# Patient Record
Sex: FEMALE | Race: OTHER | Employment: UNEMPLOYED | URBAN - METROPOLITAN AREA
[De-identification: names, ages, dates, MRNs, and addresses within clinical notes are randomized per-mention and may not be internally consistent; named-entity substitution may affect disease eponyms.]

---
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
## 2021-03-24 ENCOUNTER — HOSPITAL ENCOUNTER (OUTPATIENT)
Age: 21
Discharge: HOME | End: 2021-03-24
Payer: COMMERCIAL

## 2021-03-24 LAB
-: ABNORMAL
AMORPHOUS: ABNORMAL
BACTERIA: ABNORMAL
BILIRUBIN URINE: NEGATIVE
CASTS UA: ABNORMAL /LPF
COLOR: YELLOW
COMMENT UA: ABNORMAL
CRYSTALS, UA: ABNORMAL /HPF
EPITHELIAL CELLS UA: ABNORMAL /HPF
GLUCOSE URINE: NEGATIVE
HCG(URINE) PREGNANCY TEST: NEGATIVE
KETONES, URINE: NEGATIVE
LEUKOCYTE ESTERASE, URINE: NEGATIVE
MUCUS: ABNORMAL
NITRITE, URINE: NEGATIVE
OTHER OBSERVATIONS UA: ABNORMAL
PH UA: 5.5 (ref 5–8)
PROTEIN UA: NEGATIVE
RBC UA: ABNORMAL /HPF
RENAL EPITHELIAL, UA: ABNORMAL /HPF
SPECIFIC GRAVITY UA: 1.02 (ref 1–1.03)
TRICHOMONAS: ABNORMAL
TURBIDITY: CLEAR
URINE HGB: ABNORMAL
UROBILINOGEN, URINE: NORMAL
WBC UA: ABNORMAL /HPF
YEAST: ABNORMAL

## 2021-03-24 PROCEDURE — 81001 URINALYSIS AUTO W/SCOPE: CPT

## 2021-03-24 PROCEDURE — 87591 N.GONORRHOEAE DNA AMP PROB: CPT

## 2021-03-24 PROCEDURE — 87491 CHLMYD TRACH DNA AMP PROBE: CPT

## 2021-03-24 PROCEDURE — 87086 URINE CULTURE/COLONY COUNT: CPT

## 2021-03-24 PROCEDURE — 81025 URINE PREGNANCY TEST: CPT

## 2021-03-25 LAB
C. TRACHOMATIS DNA ,URINE: NEGATIVE
N. GONORRHOEAE DNA, URINE: NEGATIVE
SPECIMEN DESCRIPTION: NORMAL

## 2021-03-26 LAB
CULTURE: NORMAL
Lab: NORMAL
SPECIMEN DESCRIPTION: NORMAL

## 2021-12-30 ENCOUNTER — HOSPITAL ENCOUNTER (EMERGENCY)
Age: 21
Discharge: HOME | End: 2021-12-30
Attending: EMERGENCY MEDICINE
Payer: COMMERCIAL

## 2021-12-30 VITALS
OXYGEN SATURATION: 99 % | TEMPERATURE: 97.3 F | SYSTOLIC BLOOD PRESSURE: 111 MMHG | DIASTOLIC BLOOD PRESSURE: 66 MMHG | HEART RATE: 109 BPM | WEIGHT: 130 LBS | BODY MASS INDEX: 22.2 KG/M2 | RESPIRATION RATE: 16 BRPM | HEIGHT: 64 IN

## 2021-12-30 DIAGNOSIS — R11.2 INTRACTABLE VOMITING WITH NAUSEA, UNSPECIFIED VOMITING TYPE: Primary | ICD-10-CM

## 2021-12-30 DIAGNOSIS — R82.71 ASYMPTOMATIC BACTERIURIA: ICD-10-CM

## 2021-12-30 LAB
-: ABNORMAL
ABSOLUTE EOS #: 0.1 K/UL (ref 0–0.4)
ABSOLUTE IMMATURE GRANULOCYTE: ABNORMAL K/UL (ref 0–0.3)
ABSOLUTE LYMPH #: 1.2 K/UL (ref 1–4.8)
ABSOLUTE MONO #: 0.6 K/UL (ref 0.1–1.3)
ALBUMIN SERPL-MCNC: 4.5 G/DL (ref 3.5–5.2)
ALBUMIN/GLOBULIN RATIO: ABNORMAL (ref 1–2.5)
ALP BLD-CCNC: 55 U/L (ref 35–104)
ALT SERPL-CCNC: 18 U/L (ref 5–33)
AMORPHOUS: ABNORMAL
ANION GAP SERPL CALCULATED.3IONS-SCNC: 12 MMOL/L (ref 9–17)
AST SERPL-CCNC: 24 U/L
BACTERIA: ABNORMAL
BASOPHILS # BLD: 0 % (ref 0–2)
BASOPHILS ABSOLUTE: 0 K/UL (ref 0–0.2)
BILIRUB SERPL-MCNC: 0.21 MG/DL (ref 0.3–1.2)
BILIRUBIN URINE: NEGATIVE
BUN BLDV-MCNC: 5 MG/DL (ref 6–20)
BUN/CREAT BLD: ABNORMAL (ref 9–20)
CALCIUM SERPL-MCNC: 9.4 MG/DL (ref 8.6–10.4)
CASTS UA: ABNORMAL /LPF
CHLORIDE BLD-SCNC: 100 MMOL/L (ref 98–107)
CO2: 21 MMOL/L (ref 20–31)
COLOR: YELLOW
COMMENT UA: ABNORMAL
CREAT SERPL-MCNC: 0.44 MG/DL (ref 0.5–0.9)
CRYSTALS, UA: ABNORMAL /HPF
DIFFERENTIAL TYPE: ABNORMAL
EOSINOPHILS RELATIVE PERCENT: 1 % (ref 0–4)
EPITHELIAL CELLS UA: ABNORMAL /HPF
GFR AFRICAN AMERICAN: >60 ML/MIN
GFR NON-AFRICAN AMERICAN: >60 ML/MIN
GFR SERPL CREATININE-BSD FRML MDRD: ABNORMAL ML/MIN/{1.73_M2}
GFR SERPL CREATININE-BSD FRML MDRD: ABNORMAL ML/MIN/{1.73_M2}
GLUCOSE BLD-MCNC: 89 MG/DL (ref 70–99)
GLUCOSE URINE: NEGATIVE
HCT VFR BLD CALC: 27.2 % (ref 36–46)
HEMOGLOBIN: 9.1 G/DL (ref 12–16)
IMMATURE GRANULOCYTES: ABNORMAL %
KETONES, URINE: NEGATIVE
LEUKOCYTE ESTERASE, URINE: ABNORMAL
LIPASE: 28 U/L (ref 13–60)
LYMPHOCYTES # BLD: 15 % (ref 25–45)
MCH RBC QN AUTO: 22.3 PG (ref 26–34)
MCHC RBC AUTO-ENTMCNC: 33.5 G/DL (ref 31–37)
MCV RBC AUTO: 66.5 FL (ref 80–100)
MONOCYTES # BLD: 8 % (ref 2–8)
MUCUS: ABNORMAL
NITRITE, URINE: NEGATIVE
NRBC AUTOMATED: ABNORMAL PER 100 WBC
OTHER OBSERVATIONS UA: ABNORMAL
PDW BLD-RTO: 17.7 % (ref 11.5–14.9)
PH UA: 7.5 (ref 5–8)
PLATELET # BLD: 405 K/UL (ref 150–450)
PLATELET ESTIMATE: ABNORMAL
PMV BLD AUTO: 7.7 FL (ref 6–12)
POTASSIUM SERPL-SCNC: 3.6 MMOL/L (ref 3.7–5.3)
PROTEIN UA: NEGATIVE
RBC # BLD: 4.09 M/UL (ref 4–5.2)
RBC # BLD: ABNORMAL 10*6/UL
RBC UA: ABNORMAL /HPF
RENAL EPITHELIAL, UA: ABNORMAL /HPF
SARS-COV-2, RAPID: NOT DETECTED
SEG NEUTROPHILS: 76 % (ref 34–64)
SEGMENTED NEUTROPHILS ABSOLUTE COUNT: 6.4 K/UL (ref 1.3–9.1)
SODIUM BLD-SCNC: 133 MMOL/L (ref 135–144)
SPECIFIC GRAVITY UA: 1.02 (ref 1–1.03)
SPECIMEN DESCRIPTION: NORMAL
TOTAL PROTEIN: 8.2 G/DL (ref 6.4–8.3)
TRICHOMONAS: ABNORMAL
TURBIDITY: CLEAR
URINE HGB: NEGATIVE
UROBILINOGEN, URINE: NORMAL
WBC # BLD: 8.3 K/UL (ref 4.5–13.5)
WBC # BLD: ABNORMAL 10*3/UL
WBC UA: ABNORMAL /HPF
YEAST: ABNORMAL

## 2021-12-30 PROCEDURE — 36415 COLL VENOUS BLD VENIPUNCTURE: CPT

## 2021-12-30 PROCEDURE — 83690 ASSAY OF LIPASE: CPT

## 2021-12-30 PROCEDURE — 6370000000 HC RX 637 (ALT 250 FOR IP): Performed by: STUDENT IN AN ORGANIZED HEALTH CARE EDUCATION/TRAINING PROGRAM

## 2021-12-30 PROCEDURE — 99285 EMERGENCY DEPT VISIT HI MDM: CPT

## 2021-12-30 PROCEDURE — 2580000003 HC RX 258: Performed by: STUDENT IN AN ORGANIZED HEALTH CARE EDUCATION/TRAINING PROGRAM

## 2021-12-30 PROCEDURE — 81001 URINALYSIS AUTO W/SCOPE: CPT

## 2021-12-30 PROCEDURE — 85025 COMPLETE CBC W/AUTO DIFF WBC: CPT

## 2021-12-30 PROCEDURE — 80053 COMPREHEN METABOLIC PANEL: CPT

## 2021-12-30 PROCEDURE — 87635 SARS-COV-2 COVID-19 AMP PRB: CPT

## 2021-12-30 RX ORDER — CEPHALEXIN 500 MG/1
500 CAPSULE ORAL 2 TIMES DAILY
Qty: 10 CAPSULE | Refills: 0 | Status: SHIPPED | OUTPATIENT
Start: 2021-12-30 | End: 2022-01-04

## 2021-12-30 RX ORDER — SODIUM CHLORIDE, SODIUM LACTATE, POTASSIUM CHLORIDE, AND CALCIUM CHLORIDE .6; .31; .03; .02 G/100ML; G/100ML; G/100ML; G/100ML
1000 INJECTION, SOLUTION INTRAVENOUS ONCE
Status: COMPLETED | OUTPATIENT
Start: 2021-12-30 | End: 2021-12-30

## 2021-12-30 RX ORDER — ACETAMINOPHEN 500 MG
1000 TABLET ORAL ONCE
Status: COMPLETED | OUTPATIENT
Start: 2021-12-30 | End: 2021-12-30

## 2021-12-30 RX ORDER — PYRIDOXINE HCL (VITAMIN B6) 50 MG
50 TABLET ORAL NIGHTLY
Qty: 21 TABLET | Refills: 0 | Status: SHIPPED | OUTPATIENT
Start: 2021-12-30

## 2021-12-30 RX ADMIN — SODIUM CHLORIDE, POTASSIUM CHLORIDE, SODIUM LACTATE AND CALCIUM CHLORIDE 1000 ML: 600; 310; 30; 20 INJECTION, SOLUTION INTRAVENOUS at 17:33

## 2021-12-30 RX ADMIN — ACETAMINOPHEN 1000 MG: 500 TABLET ORAL at 17:35

## 2021-12-30 ASSESSMENT — PAIN SCALES - GENERAL: PAINLEVEL_OUTOF10: 5

## 2021-12-30 NOTE — Clinical Note
Jordan Mittal was seen and treated in our emergency department on 12/30/2021. She may return to work on 12/30/2021. If you have any questions or concerns, please don't hesitate to call.       Farhat Dugan MD

## 2021-12-30 NOTE — ED TRIAGE NOTES
Mode of arrival (squad #, walk in, police, etc) : walk in        Chief complaint(s): headache, back pain, pregnant        Arrival Note (brief scenario, treatment PTA, etc). : Pt states she has been having headaches, back pain, abd pain, and vomiting. Pt reports she thinsk she is approx 8 weeks pregnant but doesn't have a doctor. Pt denies vaginal bleeding or urinary issues. C= \"Have you ever felt that you should Cut down on your drinking? \"  No  A= \"Have people Annoyed you by criticizing your drinking? \"  No  G= \"Have you ever felt bad or Guilty about your drinking? \"  No  E= \"Have you ever had a drink as an Eye-opener first thing in the morning to steady your nerves or to help a hangover? \"  No      Deferred []      Reason for deferring: N/A    *If yes to two or more: probable alcohol abuse. *

## 2021-12-30 NOTE — ED PROVIDER NOTES
EMERGENCY DEPARTMENT ENCOUNTER   ATTENDING ATTESTATION     Pt Name: Cyrus Martins  MRN: [id number]  [place] 2000  Date of evaluation: 12/30/21       Cyrus Martins is a 24 y.o. female who presents with Headache, Back Pain, Emesis, and Abdominal Pain      MDM:   abd nontender  Lungs cta  No focal neuro deficits  Nontoxic well appearing  covid neg  Iv fluids in ED  FU GYN [facility] [facility] monday    Vitals:   Vitals:    12/30/21 1611   BP: 111/66   Pulse: 109   Resp: 16   Temp: 97.3 °F (36.3 °C)   TempSrc: Temporal   SpO2: 99%   Weight: 130 lb (59 kg)   Height: 5' 4\" (1.626 m)         I personally evaluated and examined the patient in conjunction with the resident and agree with the assessment, treatment plan, and disposition of the patient as recorded by the resident. I performed a history and physical examination of the patient and discussed management with the resident. I reviewed the residents note and agree with the documented findings and plan of care. Any areas of disagreement are noted on the chart. I was personally present for the key portions of any procedures. I have documented in the chart those procedures where I was not present during the key portions. I have personally reviewed all images and agree with the resident's interpretation. I have reviewed the emergency nurses triage note. I agree with the chief complaint, past medical history, past surgical history, allergies, medications, social and family history as documented unless otherwise noted. The care is provided during an unprecedented national emergency due to the novel coronavirus, COVID 19.   Mica Waite MD  Attending Emergency Physician            Mica Waite MD  12/30/21 [id number]

## 2021-12-30 NOTE — ED PROVIDER NOTES
[address] ED  Emergency Department Encounter  EmergencyMedicineResident     This patient was seen during the COVID-19 crisis. There were limited resources and those resources we did have had to be conserved for the sickest of patients. Pt Name: Javed Stein  MRN: [id number]  [place] 2000  Date of evaluation: 12/30/21  PCP: No primary care provider on file. CHIEF COMPLAINT       Chief Complaint   Patient presents with    Headache    Back Pain    Emesis    Abdominal Pain       HISTORY OF PRESENT ILLNESS  (Location/Symptom, Timing/Onset, Context/Setting, Quality, Duration, Modifying Factors, Severity.)      Javed Stein is a 24 y.o. female who presents for evaluation of abdominal pain. She reports that she believes is around 2 months pregnant. This is her first pregnancy. She has no significant past medical history. She is going to the bathroom normally. She has no nausea or vomiting current, but does note that she has had a couple episodes in the last week. She currently has no medication at home for nausea or vomiting. She is taking prenatal vitamins. She is not following with an OB at this point. PAST MEDICAL / SURGICAL /SOCIAL / FAMILY HISTORY      has a past medical history of Martha-Parkinson-White pattern. has no past surgical history on file.       Social History     Socioeconomic History    Marital status: Single     Spouse name: Not on file    Number of children: Not on file    Years of education: Not on file    Highest education level: Not on file   Occupational History    Not on file   Tobacco Use    Smoking status: Never Smoker    Smokeless tobacco: Never Used   Substance and Sexual Activity    Alcohol use: No    Drug use: No    Sexual activity: Not on file   Other Topics Concern    Not on file   Social History Narrative    Not on file     Social Determinants of Health     Financial Resource Strain:     Difficulty of Paying Living Expenses: Not on file Negative for activity change, chills and fever. HENT: Negative for congestion, sinus pain and sore throat. Eyes: Negative for pain and visual disturbance. Respiratory: Negative for cough and shortness of breath. Cardiovascular: Negative for chest pain. Gastrointestinal: Positive for abdominal pain. Negative for diarrhea, nausea and vomiting. Genitourinary: Negative for difficulty urinating, dysuria and hematuria. Musculoskeletal: Negative for back pain and myalgias. Skin: Negative for rash and wound. Neurological: Negative for dizziness, light-headedness and headaches. Psychiatric/Behavioral: Negative for agitation and confusion. PHYSICAL EXAM   (up to 7 for level 4, 8 or more forlevel 5)      ED TRIAGE VITALS BP: 111/66, Temp: 97.3 °F (36.3 °C), Pulse: 109, Resp: 16, SpO2: 99 %    Vitals:    12/30/21 1611   BP: 111/66   Pulse: 109   Resp: 16   Temp: 97.3 °F (36.3 °C)   TempSrc: Temporal   SpO2: 99%   Weight: 130 lb (59 kg)   Height: 5' 4\" (1.626 m)         Physical Exam  Vitals and nursing note reviewed. Constitutional:       Appearance: Normal appearance. HENT:      Head: Normocephalic and atraumatic. Nose: Nose normal.      Mouth/Throat:      Mouth: Mucous membranes are moist.   Eyes:      Extraocular Movements: Extraocular movements intact. Pupils: Pupils are equal, round, and reactive to light. Cardiovascular:      Rate and Rhythm: Regular rhythm. Tachycardia present. Pulses: Normal pulses. Heart sounds: Normal heart sounds. Pulmonary:      Effort: Pulmonary effort is normal.      Breath sounds: Normal breath sounds. Abdominal:      General: Abdomen is flat. There is no distension. Palpations: Abdomen is soft. Tenderness: There is no abdominal tenderness. There is no guarding or rebound. Musculoskeletal:         General: Normal range of motion. Cervical back: Normal range of motion. Skin:     General: Skin is warm and dry. Capillary Refill: Capillary refill takes less than 2 seconds. Neurological:      General: No focal deficit present. Mental Status: She is alert and oriented to person, place, and time. Psychiatric:         Mood and Affect: Mood normal.         Behavior: Behavior normal.           DIFFERENTIAL  DIAGNOSIS     PLAN (LABS / IMAGING / EKG):  Orders Placed This Encounter   Procedures    COVID-19, Rapid    Urinalysis Reflex to Culture    CBC Auto Differential    Lipase    Comprehensive Metabolic Panel w/ Reflex to MG    Microscopic Urinalysis       MEDICATIONS ORDERED:  ED Medication Orders (From admission, onward)    Start Ordered     Status Ordering Provider    12/30/21 1700 12/30/21 1659  lactated ringers bolus  ONCE         Last MAR action: Stopped - by Cher Salvador on 12/30/21 at SHARMILA Maki    12/30/21 1700 12/30/21 1659  acetaminophen (TYLENOL) tablet 1,000 mg  ONCE         Last MAR action: Given - by Cher Salvador on 12/30/21 at Enedina Mendez, Shravan DUMAS          DDX: Pregnancy, UTI    DIAGNOSTIC RESULTS / EMERGENCY DEPARTMENT COURSE / MDM     IMPRESSION & INITIAL PLAN:  This is a 51-year-old female presenting return today for evaluation of abdominal pain in the setting of pregnancy. Patient reports that she believes she is around 2 months pregnant. She has not seen OB yet to this point. Complaining of crampy abdominal pain. She had a few episodes of nausea and vomiting earlier this week. She has no medication at home for nausea vomiting. She is taking prenatal vitamins. On exam her belly soft nontender. Bedside also was performed showed a eng fetus with a fetal heart rate of 150. No gross anatomical abnormalities were noted. Laboratory work-up relatively unremarkable. Plan discharge patient home with Keflex for treatment of asymptomatic bacteriuria.       LABS:  Results for orders placed or performed during the hospital encounter of 12/30/21   COVID-19, Rapid    Specimen: Nasopharyngeal Swab   Result Value Ref Range    Specimen Description . NASOPHARYNGEAL SWAB     SARS-CoV-2, Rapid Not Detected Not Detected   Urinalysis Reflex to Culture    Specimen: Urine, clean catch   Result Value Ref Range    Color, UA Yellow Yellow    Turbidity UA Clear Clear    Glucose, Ur NEGATIVE NEGATIVE    Bilirubin Urine NEGATIVE NEGATIVE    Ketones, Urine NEGATIVE NEGATIVE    Specific Gravity, UA 1.017 1.000 - 1.030    Urine Hgb NEGATIVE NEGATIVE    pH, UA 7.5 5.0 - 8.0    Protein, UA NEGATIVE NEGATIVE    Urobilinogen, Urine Normal Normal    Nitrite, Urine NEGATIVE NEGATIVE    Leukocyte Esterase, Urine SMALL (A) NEGATIVE    Urinalysis Comments NOT REPORTED    CBC Auto Differential   Result Value Ref Range    WBC 8.3 4.5 - 13.5 k/uL    RBC 4.09 4.0 - 5.2 m/uL    Hemoglobin 9.1 (L) 12.0 - 16.0 g/dL    Hematocrit 27.2 (L) 36 - 46 %    MCV 66.5 (L) 80 - 100 fL    MCH 22.3 (L) 26 - 34 pg    MCHC 33.5 31 - 37 g/dL    RDW 17.7 (H) 11.5 - 14.9 %    Platelets 977 238 - 544 k/uL    MPV 7.7 6.0 - 12.0 fL    NRBC Automated NOT REPORTED per 100 WBC    Differential Type NOT REPORTED     Immature Granulocytes NOT REPORTED 0 %    Absolute Immature Granulocyte NOT REPORTED 0.00 - 0.30 k/uL    WBC Morphology NOT REPORTED     RBC Morphology NOT REPORTED     Platelet Estimate NOT REPORTED     Seg Neutrophils 76 (H) 34 - 64 %    Lymphocytes 15 (L) 25 - 45 %    Monocytes 8 2 - 8 %    Eosinophils % 1 0 - 4 %    Basophils 0 0 - 2 %    Segs Absolute 6.40 1.3 - 9.1 k/uL    Absolute Lymph # 1.20 1.0 - 4.8 k/uL    Absolute Mono # 0.60 0.1 - 1.3 k/uL    Absolute Eos # 0.10 0.0 - 0.4 k/uL    Basophils Absolute 0.00 0.0 - 0.2 k/uL   Lipase   Result Value Ref Range    Lipase 28 13 - 60 U/L   Comprehensive Metabolic Panel w/ Reflex to MG   Result Value Ref Range    Glucose 89 70 - 99 mg/dL    BUN 5 (L) 6 - 20 mg/dL    CREATININE 0.44 (L) 0.50 - 0.90 mg/dL    Bun/Cre Ratio NOT REPORTED 9 - 20    Calcium 9.4 8.6 - 10.4 mg/dL    Sodium 133 (L) 135 - 144 mmol/L    Potassium 3.6 (L) 3.7 - 5.3 mmol/L    Chloride 100 98 - 107 mmol/L    CO2 21 20 - 31 mmol/L    Anion Gap 12 9 - 17 mmol/L    Alkaline Phosphatase 55 35 - 104 U/L    ALT 18 5 - 33 U/L    AST 24 <32 U/L    Total Bilirubin 0.21 (L) 0.3 - 1.2 mg/dL    Total Protein 8.2 6.4 - 8.3 g/dL    Albumin 4.5 3.5 - 5.2 g/dL    Albumin/Globulin Ratio NOT REPORTED 1.0 - 2.5    GFR Non-African American >60 >60 mL/min    GFR African American >60 >60 mL/min    GFR Comment          GFR Staging NOT REPORTED    Microscopic Urinalysis   Result Value Ref Range    -          WBC, UA 2 TO 5 /HPF    RBC, UA 2 TO 5 /HPF    Casts UA NOT REPORTED /LPF    Crystals, UA NOT REPORTED None /HPF    Epithelial Cells UA 10 TO 20 /HPF    Renal Epithelial, UA NOT REPORTED 0 /HPF    Bacteria, UA MODERATE (A) None    Mucus, UA NOT REPORTED None    Trichomonas, UA NOT REPORTED None    Amorphous, UA 1+ (A) None    Other Observations UA NOT REPORTED NOT REQ. Yeast, UA NOT REPORTED None       RADIOLOGY:  No orders to display     EMERGENCY DEPARTMENT COURSE:    ED Course as of 12/31/21 0241   Thu Dec 30, 2021   1757 Fetal Heart Rate 150 [TJ]      ED Course User Index  [TJ] Jimmy Angelo MD      CONSULTS:  None    CRITICAL CARE:  See attending physician note    FINAL IMPRESSION      1. Intractable vomiting with nausea, unspecified vomiting type    2.  Asymptomatic bacteriuria          DISPOSITION / PLAN     DISPOSITION Decision To Discharge 12/30/2021 06:15:30 PM      PATIENT REFERRED TO:  Arben Pedroza [id number]  [phone]  Schedule an appointment as soon as possible for a visit today      [facility] ED  [facility] [id number]  [phone]    If symptoms worsen      DISCHARGE MEDICATIONS:  Discharge Medication List as of 12/30/2021  6:21 PM      START taking these medications    Details   doxyLAMINE succinate (GNP SLEEP AID) 25 MG tablet Take 1 tablet by mouth nightly, Disp-21 tablet, R-0Normal      Pyridoxine HCl (B-6) 50 MG TABS Take 1 tablet by mouth nightly, Disp-21 tablet, R-0Normal      cephALEXin (KEFLEX) 500 MG capsule Take 1 capsule by mouth 2 times daily for 5 days, Disp-10 capsule, R-0Normal           Discharge Medication List as of 12/30/2021  6:21 PM           Vilma Carlos MD  Emergency Medicine Resident    (Please note that portions of this note were completed with a voice recognition program.  Efforts were made to edit the dictations but occasionally words are mis-transcribed.)       Vilma Carlos MD  Resident  12/31/21 [id number]

## 2022-01-11 ENCOUNTER — OFFICE VISIT (OUTPATIENT)
Dept: OBGYN CLINIC | Age: 22
End: 2022-01-11
Payer: COMMERCIAL

## 2022-01-11 ENCOUNTER — HOSPITAL ENCOUNTER (OUTPATIENT)
Age: 22
Discharge: HOME | End: 2022-01-11
Payer: COMMERCIAL

## 2022-01-11 ENCOUNTER — TELEPHONE (OUTPATIENT)
Dept: OBGYN CLINIC | Age: 22
End: 2022-01-11

## 2022-01-11 VITALS
HEIGHT: 64 IN | DIASTOLIC BLOOD PRESSURE: 74 MMHG | WEIGHT: 126 LBS | BODY MASS INDEX: 21.51 KG/M2 | SYSTOLIC BLOOD PRESSURE: 116 MMHG

## 2022-01-11 DIAGNOSIS — Z34.00 PRIMIGRAVIDA, ANTEPARTUM: ICD-10-CM

## 2022-01-11 DIAGNOSIS — N92.6 MISSED MENSES: ICD-10-CM

## 2022-01-11 DIAGNOSIS — N92.6 MISSED MENSES: Primary | ICD-10-CM

## 2022-01-11 DIAGNOSIS — O36.80X0 ENCOUNTER TO DETERMINE FETAL VIABILITY OF PREGNANCY, SINGLE OR UNSPECIFIED FETUS: Primary | ICD-10-CM

## 2022-01-11 DIAGNOSIS — O21.9 NAUSEA/VOMITING IN PREGNANCY: ICD-10-CM

## 2022-01-11 LAB — HCG QUANTITATIVE: ABNORMAL IU/L

## 2022-01-11 PROCEDURE — 99202 OFFICE O/P NEW SF 15 MIN: CPT | Performed by: NURSE PRACTITIONER

## 2022-01-11 PROCEDURE — G8420 CALC BMI NORM PARAMETERS: HCPCS | Performed by: NURSE PRACTITIONER

## 2022-01-11 PROCEDURE — G8427 DOCREV CUR MEDS BY ELIG CLIN: HCPCS | Performed by: NURSE PRACTITIONER

## 2022-01-11 PROCEDURE — 36415 COLL VENOUS BLD VENIPUNCTURE: CPT

## 2022-01-11 PROCEDURE — G8484 FLU IMMUNIZE NO ADMIN: HCPCS | Performed by: NURSE PRACTITIONER

## 2022-01-11 PROCEDURE — 84702 CHORIONIC GONADOTROPIN TEST: CPT

## 2022-01-11 PROCEDURE — 1036F TOBACCO NON-USER: CPT | Performed by: NURSE PRACTITIONER

## 2022-01-11 RX ORDER — PYRIDOXINE HCL (VITAMIN B6) 50 MG
50 TABLET ORAL 2 TIMES DAILY
Qty: 60 TABLET | Refills: 3 | Status: SHIPPED
Start: 2022-01-11 | End: 2022-02-23 | Stop reason: CLARIF

## 2022-01-11 RX ORDER — ASCORBIC ACID, BIOTIN, CHOLECALCIFEROL, CYANOCOBALAMIN, FOLIC ACID, FERROUS ASPARTO GLYCINATE, POTASSIUM IODIDE, PYRIDOXINE HYDROCHLORIDE, .ALPHA.-TOCOPHEROL ACETATE, DL-, DOCUSATE SODIUM AND BLUEBERRY 30; 75; 500; 13; 400; 10; 150; 5; 10; 200; 5; 600 MG/1; UG/1; [IU]/1; UG/1; UG/1; MG/1; UG/1; MG/1; [IU]/1; MG/1; MG/1; UG/1
1 TABLET, FILM COATED ORAL DAILY
Qty: 90 CAPSULE | Refills: 3 | Status: SHIPPED | OUTPATIENT
Start: 2022-01-11 | End: 2022-04-11

## 2022-01-11 NOTE — PROGRESS NOTES
Latisha Haddad  2022    YOB: 2000          The patient was seen today. She is here regarding early pregnancy, LMP . Went to ER at [facility] with nausea on - told she was about 8 weeks by bedside ultrasound. No longer has nausea. Hx of WPW. Denies symptoms. Pregnancy not planned but excited. First pregnancy. Her bowels are regular and she is voiding without difficulty. HPI:  Abdoul Le is a 24 y.o. female      Early pregnancy      OB History    Para Term  AB Living   1 0 0 0 0 0   SAB IAB Ectopic Molar Multiple Live Births   0 0 0 0 0 0      # Outcome Date GA Lbr Antony/2nd Weight Sex Delivery Anes PTL Lv   1 Current                Past Medical History:   Diagnosis Date    Martha-Parkinson-White pattern 2013       History reviewed. No pertinent surgical history. History reviewed. No pertinent family history. Social History     Socioeconomic History    Marital status: Single     Spouse name: Not on file    Number of children: Not on file    Years of education: Not on file    Highest education level: Not on file   Occupational History    Not on file   Tobacco Use    Smoking status: Never Smoker    Smokeless tobacco: Never Used   Substance and Sexual Activity    Alcohol use: No    Drug use: No    Sexual activity: Not on file   Other Topics Concern    Not on file   Social History Narrative    Not on file     Social Determinants of Health     Financial Resource Strain:     Difficulty of Paying Living Expenses: Not on file   Food Insecurity:     Worried About Running Out of Food in the Last Year: Not on file    Kathleen of Food in the Last Year: Not on file   Transportation Needs:     Lack of Transportation (Medical): Not on file    Lack of Transportation (Non-Medical):  Not on file   Physical Activity:     Days of Exercise per Week: Not on file    Minutes of Exercise per Session: Not on file   Stress:     Feeling of Stress : Not on file   Social Connections:     Frequency of Communication with Friends and Family: Not on file    Frequency of Social Gatherings with Friends and Family: Not on file    Attends Bahai Services: Not on file    Active Member of Clubs or Organizations: Not on file    Attends Club or Organization Meetings: Not on file    Marital Status: Not on file   Intimate Partner Violence:     Fear of Current or Ex-Partner: Not on file    Emotionally Abused: Not on file    Physically Abused: Not on file    Sexually Abused: Not on file   Housing Stability:     Unable to Pay for Housing in the Last Year: Not on file    Number of [place] in the Last Year: Not on file    Unstable Housing in the Last Year: Not on file         MEDICATIONS:  Current Outpatient Medications   Medication Sig Dispense Refill    pyridoxine (RA VITAMIN B-6) 50 MG tablet Take 1 tablet by mouth 2 times daily 60 tablet 3    Prenat-FeAsp-Meth-FA-DHA w/o A (PRENATE PIXIE) 10-0.6-0.4-200 MG CAPS Take 1 tablet by mouth daily 90 capsule 3    doxyLAMINE succinate (GNP SLEEP AID) 25 MG tablet Take 1 tablet by mouth nightly 21 tablet 0    Pyridoxine HCl (B-6) 50 MG TABS Take 1 tablet by mouth nightly 21 tablet 0     No current facility-administered medications for this visit. ALLERGIES:  Allergies as of 01/11/2022    (No Known Allergies)         REVIEW OF SYSTEMS:    yes   A minimum of an eleven point review of systems was completed. Review Of Systems (11 point):  Constitutional: No fever, chills or malaise;  No weight change or fatigue  Head and Eyes: No vision, Headache, Dizziness or trauma in last 12 months  ENT ROS: No hearing, Tinnitis, sinus or taste problems  Hematological and Lymphatic ROS:No Lymphoma, Von Willebrand's, Hemophillia or Bleeding History  Psych ROS: No Depression, Homicidal thoughts,suicidal thoughts, or anxiety  Breast ROS: No prior breast abnormalities or lumps  Respiratory ROS: No SOB, Pneumoniae,Cough, or Pulmonary Embolism History  Cardiovascular ROS: No Chest Pain with Exertion, Palpitations, Syncope, Edema, Arrhythmia  Gastrointestinal ROS: No Indigestion, Heartburn, Nausea, vomiting, Diarrhea, Constipation,or Bowel Changes; No Bloody Stools or melena  Genito-Urinary ROS: No Dysuria, Hematuria or Nocturia. No Urinary Incontinence or Vaginal Discharge  Musculoskeletal ROS: No Arthralgia, Arthritis,Gout,Osteoporosis or Rheumatism  Neurological ROS: No CVA, Migraines, Epilepsy, Seizure Hx, or Limb Weakness  Dermatological ROS: No Rash, Itching, Hives, Mole Changes or Cancer          Blood pressure 116/74, height 5' 4\" (1.626 m), weight 126 lb (57.2 kg), last menstrual period 10/10/2021, not currently breastfeeding. Abdomen: Soft non-tender; good bowel sounds. No guarding, rebound or rigidity. No CVA tenderness bilaterally. Extremities: No calf tenderness, DTR 2/4, and No edema bilaterally    Pelvic: Exam deferred. Diagnostics:  No results found. Lab Results:  Results for orders placed or performed during the hospital encounter of 12/30/21   COVID-19, Rapid    Specimen: Nasopharyngeal Swab   Result Value Ref Range    Specimen Description . NASOPHARYNGEAL SWAB     SARS-CoV-2, Rapid Not Detected Not Detected   Urinalysis Reflex to Culture    Specimen: Urine, clean catch   Result Value Ref Range    Color, UA Yellow Yellow    Turbidity UA Clear Clear    Glucose, Ur NEGATIVE NEGATIVE    Bilirubin Urine NEGATIVE NEGATIVE    Ketones, Urine NEGATIVE NEGATIVE    Specific Gravity, UA 1.017 1.000 - 1.030    Urine Hgb NEGATIVE NEGATIVE    pH, UA 7.5 5.0 - 8.0    Protein, UA NEGATIVE NEGATIVE    Urobilinogen, Urine Normal Normal    Nitrite, Urine NEGATIVE NEGATIVE    Leukocyte Esterase, Urine SMALL (A) NEGATIVE    Urinalysis Comments NOT REPORTED    CBC Auto Differential   Result Value Ref Range    WBC 8.3 4.5 - 13.5 k/uL    RBC 4.09 4.0 - 5.2 m/uL    Hemoglobin 9.1 (L) 12.0 - 16.0 g/dL    Hematocrit 27.2 (L) 36 - 46 %    MCV 66.5 (L) 80 - 100 fL    MCH 22.3 (L) 26 - 34 pg    MCHC 33.5 31 - 37 g/dL    RDW 17.7 (H) 11.5 - 14.9 %    Platelets 024 428 - 687 k/uL    MPV 7.7 6.0 - 12.0 fL    NRBC Automated NOT REPORTED per 100 WBC    Differential Type NOT REPORTED     Immature Granulocytes NOT REPORTED 0 %    Absolute Immature Granulocyte NOT REPORTED 0.00 - 0.30 k/uL    WBC Morphology NOT REPORTED     RBC Morphology NOT REPORTED     Platelet Estimate NOT REPORTED     Seg Neutrophils 76 (H) 34 - 64 %    Lymphocytes 15 (L) 25 - 45 %    Monocytes 8 2 - 8 %    Eosinophils % 1 0 - 4 %    Basophils 0 0 - 2 %    Segs Absolute 6.40 1.3 - 9.1 k/uL    Absolute Lymph # 1.20 1.0 - 4.8 k/uL    Absolute Mono # 0.60 0.1 - 1.3 k/uL    Absolute Eos # 0.10 0.0 - 0.4 k/uL    Basophils Absolute 0.00 0.0 - 0.2 k/uL   Lipase   Result Value Ref Range    Lipase 28 13 - 60 U/L   Comprehensive Metabolic Panel w/ Reflex to MG   Result Value Ref Range    Glucose 89 70 - 99 mg/dL    BUN 5 (L) 6 - 20 mg/dL    CREATININE 0.44 (L) 0.50 - 0.90 mg/dL    Bun/Cre Ratio NOT REPORTED 9 - 20    Calcium 9.4 8.6 - 10.4 mg/dL    Sodium 133 (L) 135 - 144 mmol/L    Potassium 3.6 (L) 3.7 - 5.3 mmol/L    Chloride 100 98 - 107 mmol/L    CO2 21 20 - 31 mmol/L    Anion Gap 12 9 - 17 mmol/L    Alkaline Phosphatase 55 35 - 104 U/L    ALT 18 5 - 33 U/L    AST 24 <32 U/L    Total Bilirubin 0.21 (L) 0.3 - 1.2 mg/dL    Total Protein 8.2 6.4 - 8.3 g/dL    Albumin 4.5 3.5 - 5.2 g/dL    Albumin/Globulin Ratio NOT REPORTED 1.0 - 2.5    GFR Non-African American >60 >60 mL/min    GFR African American >60 >60 mL/min    GFR Comment          GFR Staging NOT REPORTED    Microscopic Urinalysis   Result Value Ref Range    -          WBC, UA 2 TO 5 /HPF    RBC, UA 2 TO 5 /HPF    Casts UA NOT REPORTED /LPF    Crystals, UA NOT REPORTED None /HPF    Epithelial Cells UA 10 TO 20 /HPF    Renal Epithelial, UA NOT REPORTED 0 /HPF    Bacteria, UA MODERATE (A) None    Mucus, UA NOT REPORTED None Trichomonas, UA NOT REPORTED None    Amorphous, UA 1+ (A) None    Other Observations UA NOT REPORTED NOT REQ. Yeast, UA NOT REPORTED None         Assessment:   Diagnosis Orders   1. Missed menses  HCG, Quantitative, Pregnancy    Prenat-FeAsp-Meth-FA-DHA w/o A (PRENATE PIXIE) 10-0.6-0.4-200 MG CAPS   2. Nausea/vomiting in pregnancy  pyridoxine (RA VITAMIN B-6) 50 MG tablet    Prenat-FeAsp-Meth-FA-DHA w/o A (PRENATE PIXIE) 10-0.6-0.4-200 MG CAPS   3. Primigravida, antepartum       Patient Active Problem List    Diagnosis Date Noted   Luciana Morelos, antepartum 01/11/2022     Priority: High    WPW (Martha-Parkinson-White syndrome) 12/09/2016     s/p ablation       Dysautonomia orthostatic hypotension syndrome     Dizziness 11/23/2015    GERD (gastroesophageal reflux disease) 11/25/2014           PLAN:  Return in about 2 weeks (around 1/25/2022) for ob ultrasound / new ob intake. Samples of prenatal vitamins given. Script for prenate pixie sent to [facility]. Complete quant HCG today. Repeat Annual every 1 year  Cervical Cytology Evaluation begins at 24years old. If Negative Cytology, Follow-up screening per current guidelines. Return to the office in 2 weeks. Counseled on preventative health maintenance follow-up. Orders Placed This Encounter   Procedures    HCG, Quantitative, Pregnancy     Standing Status:   Future     Standing Expiration Date:   1/10/2023           The patient, Cande Rodriguez is a 24 y.o. female, was seen with a total time spent of 20 minutes for the visit on this date of service by the E/M provider. The time component had both face to face and non face to face time spent in determining the total time component. Counseling and education regarding her diagnosis listed below and her options regarding those diagnoses were also included in determining her time component. Diagnosis Orders   1.  Missed menses  HCG, Quantitative, Pregnancy    Prenat-FeAsp-Meth-FA-DHA w/o A (PRENATE PIXIE) 10-0.6-0.4-200 MG CAPS   2. Nausea/vomiting in pregnancy  pyridoxine (RA VITAMIN B-6) 50 MG tablet    Prenat-FeAsp-Meth-FA-DHA w/o A (PRENATE PIXIE) 10-0.6-0.4-200 MG CAPS   3. Primigravida, antepartum          The patient had her preventative health maintenance recommendations and follow-up reviewed with her at the completion of her visit.

## 2022-01-11 NOTE — TELEPHONE ENCOUNTER
Patient notified of results and recommendations, patient states her LMP was 9/29/21. Call transferred to [facility] to schedule intake and ultrasound.

## 2022-01-11 NOTE — TELEPHONE ENCOUNTER
----- Message from CHANCE Hernandez CNP sent at 1/11/2022 12:27 PM EST -----  + quant  Please schedule for new OB intake/ ultrasound

## 2022-01-18 ENCOUNTER — PROCEDURE VISIT (OUTPATIENT)
Dept: OBGYN CLINIC | Age: 22
End: 2022-01-18
Payer: COMMERCIAL

## 2022-01-18 ENCOUNTER — INITIAL PRENATAL (OUTPATIENT)
Dept: OBGYN CLINIC | Age: 22
End: 2022-01-18
Payer: COMMERCIAL

## 2022-01-18 VITALS
SYSTOLIC BLOOD PRESSURE: 100 MMHG | HEART RATE: 93 BPM | BODY MASS INDEX: 21.46 KG/M2 | WEIGHT: 125 LBS | DIASTOLIC BLOOD PRESSURE: 54 MMHG

## 2022-01-18 DIAGNOSIS — O09.92 SUPERVISION OF HIGH RISK PREGNANCY IN SECOND TRIMESTER: ICD-10-CM

## 2022-01-18 DIAGNOSIS — I45.6 WPW (WOLFF-PARKINSON-WHITE SYNDROME): Primary | ICD-10-CM

## 2022-01-18 DIAGNOSIS — O09.30 LATE PRENATAL CARE: ICD-10-CM

## 2022-01-18 DIAGNOSIS — O09.32 LATE PRENATAL CARE AFFECTING PREGNANCY IN SECOND TRIMESTER: ICD-10-CM

## 2022-01-18 DIAGNOSIS — Z3A.15 15 WEEKS GESTATION OF PREGNANCY: ICD-10-CM

## 2022-01-18 DIAGNOSIS — Z34.00 PRIMIGRAVIDA, ANTEPARTUM: ICD-10-CM

## 2022-01-18 DIAGNOSIS — Z3A.15 15 WEEKS GESTATION OF PREGNANCY: Primary | ICD-10-CM

## 2022-01-18 DIAGNOSIS — O36.80X0 PREGNANCY WITH INCONCLUSIVE FETAL VIABILITY, SINGLE OR UNSPECIFIED FETUS: Primary | ICD-10-CM

## 2022-01-18 LAB
ABDOMINAL CIRCUMFERENCE: NORMAL
BIPARIETAL DIAMETER: NORMAL
ESTIMATED FETAL WEIGHT: NORMAL
FEMORAL DIAMETER: NORMAL
HC/AC: NORMAL
HEAD CIRCUMFERENCE: NORMAL

## 2022-01-18 PROCEDURE — 76805 OB US >/= 14 WKS SNGL FETUS: CPT | Performed by: OBSTETRICS & GYNECOLOGY

## 2022-01-18 PROCEDURE — 59899 UNLISTED PX MAT CARE&DLVR: CPT | Performed by: NURSE PRACTITIONER

## 2022-01-18 NOTE — PROGRESS NOTES
Patient presents to office for OB intake, nurse visit only. Intake completed following ultrasound in office to confirm pregnancy dating/viability. Based on ultrasound, patient is currently 15w6d  gestation, Estimated Date of Delivery: 7/6/22. Patient presents to intake FOB. This was an unplanned pregnancy. Patient currently taking has a current medication list which includes the following prescription(s): pyridoxine, prenate pixie, doxylamine succinate, and b-6. Matthew Johnson Patient's medical, surgical, obstetrical and family history reviewed. See EHR for details. Patient prenatal lab work given to patient at this visit as well as OB education material. New OB consent forms signed. Patient declined first trimester screening due to dating. Cystic Fibrosis screening declined. Referral placed to [facility] for NIPT and anatomy scan, and hx of lisa-parkinson-white syndrome. Patient scheduled for follow up OB appointment with Jose Oneill NP on 2/3/22. Patient instructed to complete lab work prior to follow up OB appointment.

## 2022-02-10 ENCOUNTER — HOSPITAL ENCOUNTER (OUTPATIENT)
Age: 22
Discharge: HOME | End: 2022-02-10
Payer: COMMERCIAL

## 2022-02-10 DIAGNOSIS — O09.32 LATE PRENATAL CARE AFFECTING PREGNANCY IN SECOND TRIMESTER: ICD-10-CM

## 2022-02-10 DIAGNOSIS — Z3A.15 15 WEEKS GESTATION OF PREGNANCY: ICD-10-CM

## 2022-02-10 DIAGNOSIS — Z34.00 PRIMIGRAVIDA, ANTEPARTUM: ICD-10-CM

## 2022-02-10 LAB
-: ABNORMAL
ABO/RH: NORMAL
ABSOLUTE EOS #: 0.1 K/UL (ref 0–0.4)
ABSOLUTE IMMATURE GRANULOCYTE: ABNORMAL K/UL (ref 0–0.3)
ABSOLUTE LYMPH #: 1.3 K/UL (ref 1–4.8)
ABSOLUTE MONO #: 0.4 K/UL (ref 0.1–1.3)
AMORPHOUS: ABNORMAL
ANTIBODY SCREEN: NEGATIVE
BACTERIA: ABNORMAL
BASOPHILS # BLD: 1 % (ref 0–2)
BASOPHILS ABSOLUTE: 0 K/UL (ref 0–0.2)
BILIRUBIN URINE: NEGATIVE
BLOOD BANK COMMENT: NORMAL
CASTS UA: ABNORMAL /LPF
COLOR: YELLOW
COMMENT UA: ABNORMAL
CRYSTALS, UA: ABNORMAL /HPF
DIFFERENTIAL TYPE: ABNORMAL
EOSINOPHILS RELATIVE PERCENT: 1 % (ref 0–4)
EPITHELIAL CELLS UA: ABNORMAL /HPF
GLUCOSE ADMINISTRATION: NORMAL
GLUCOSE TOLERANCE SCREEN 50G: 134 MG/DL (ref 70–135)
GLUCOSE URINE: ABNORMAL
HCT VFR BLD CALC: 27 % (ref 36–46)
HEMOGLOBIN: 8.6 G/DL (ref 12–16)
HEPATITIS B SURFACE ANTIGEN: NONREACTIVE
IMMATURE GRANULOCYTES: ABNORMAL %
KETONES, URINE: ABNORMAL
LEUKOCYTE ESTERASE, URINE: ABNORMAL
LYMPHOCYTES # BLD: 18 % (ref 25–45)
MCH RBC QN AUTO: 21.3 PG (ref 26–34)
MCHC RBC AUTO-ENTMCNC: 31.8 G/DL (ref 31–37)
MCV RBC AUTO: 67 FL (ref 80–100)
MONOCYTES # BLD: 6 % (ref 2–8)
MUCUS: ABNORMAL
NITRITE, URINE: NEGATIVE
NRBC AUTOMATED: ABNORMAL PER 100 WBC
OTHER OBSERVATIONS UA: ABNORMAL
PDW BLD-RTO: 18.6 % (ref 11.5–14.9)
PH UA: 6 (ref 5–8)
PLATELET # BLD: 410 K/UL (ref 150–450)
PLATELET ESTIMATE: ABNORMAL
PMV BLD AUTO: 7.3 FL (ref 6–12)
PROTEIN UA: ABNORMAL
RBC # BLD: 4.03 M/UL (ref 4–5.2)
RBC # BLD: ABNORMAL 10*6/UL
RBC UA: ABNORMAL /HPF
RENAL EPITHELIAL, UA: ABNORMAL /HPF
RUBV IGG SER QL: 208.9 IU/ML
SEG NEUTROPHILS: 74 % (ref 34–64)
SEGMENTED NEUTROPHILS ABSOLUTE COUNT: 5.6 K/UL (ref 1.3–9.1)
SPECIFIC GRAVITY UA: 1.02 (ref 1–1.03)
TRICHOMONAS: ABNORMAL
TSH SERPL DL<=0.05 MIU/L-ACNC: 0.85 MIU/L (ref 0.3–5)
TURBIDITY: ABNORMAL
URINE HGB: NEGATIVE
UROBILINOGEN, URINE: NORMAL
WBC # BLD: 7.5 K/UL (ref 4.5–13.5)
WBC # BLD: ABNORMAL 10*3/UL
WBC UA: ABNORMAL /HPF
YEAST: ABNORMAL

## 2022-02-10 PROCEDURE — 85025 COMPLETE CBC W/AUTO DIFF WBC: CPT

## 2022-02-10 PROCEDURE — 87086 URINE CULTURE/COLONY COUNT: CPT

## 2022-02-10 PROCEDURE — 87340 HEPATITIS B SURFACE AG IA: CPT

## 2022-02-10 PROCEDURE — 86762 RUBELLA ANTIBODY: CPT

## 2022-02-10 PROCEDURE — 81001 URINALYSIS AUTO W/SCOPE: CPT

## 2022-02-10 PROCEDURE — 84443 ASSAY THYROID STIM HORMONE: CPT

## 2022-02-10 PROCEDURE — 36415 COLL VENOUS BLD VENIPUNCTURE: CPT

## 2022-02-10 PROCEDURE — 86780 TREPONEMA PALLIDUM: CPT

## 2022-02-10 PROCEDURE — 86850 RBC ANTIBODY SCREEN: CPT

## 2022-02-10 PROCEDURE — 82950 GLUCOSE TEST: CPT

## 2022-02-10 PROCEDURE — 86900 BLOOD TYPING SEROLOGIC ABO: CPT

## 2022-02-10 PROCEDURE — 86901 BLOOD TYPING SEROLOGIC RH(D): CPT

## 2022-02-10 PROCEDURE — 87389 HIV-1 AG W/HIV-1&-2 AB AG IA: CPT

## 2022-02-11 ENCOUNTER — HOSPITAL ENCOUNTER (OUTPATIENT)
Age: 22
Setting detail: SPECIMEN
Discharge: HOME | End: 2022-02-11

## 2022-02-11 ENCOUNTER — ROUTINE PRENATAL (OUTPATIENT)
Dept: OBGYN CLINIC | Age: 22
End: 2022-02-11
Payer: COMMERCIAL

## 2022-02-11 VITALS
HEART RATE: 98 BPM | SYSTOLIC BLOOD PRESSURE: 102 MMHG | BODY MASS INDEX: 22.66 KG/M2 | WEIGHT: 132 LBS | DIASTOLIC BLOOD PRESSURE: 60 MMHG

## 2022-02-11 DIAGNOSIS — R73.09 ELEVATED GLUCOSE TOLERANCE TEST: ICD-10-CM

## 2022-02-11 DIAGNOSIS — O99.019 ANEMIA AFFECTING FIRST PREGNANCY: ICD-10-CM

## 2022-02-11 DIAGNOSIS — O09.30 LATE PRENATAL CARE: ICD-10-CM

## 2022-02-11 DIAGNOSIS — Z3A.19 19 WEEKS GESTATION OF PREGNANCY: ICD-10-CM

## 2022-02-11 DIAGNOSIS — I95.1 DYSAUTONOMIA ORTHOSTATIC HYPOTENSION SYNDROME: ICD-10-CM

## 2022-02-11 DIAGNOSIS — I45.6 WPW (WOLFF-PARKINSON-WHITE SYNDROME): ICD-10-CM

## 2022-02-11 DIAGNOSIS — Z34.00 PRIMIGRAVIDA, ANTEPARTUM: Primary | ICD-10-CM

## 2022-02-11 PROBLEM — D64.9 ANEMIA: Status: ACTIVE | Noted: 2022-02-11

## 2022-02-11 LAB
CULTURE: NORMAL
Lab: NORMAL
SPECIMEN DESCRIPTION: NORMAL
T. PALLIDUM, IGG: NONREACTIVE

## 2022-02-11 PROCEDURE — 99214 OFFICE O/P EST MOD 30 MIN: CPT | Performed by: NURSE PRACTITIONER

## 2022-02-11 PROCEDURE — G8420 CALC BMI NORM PARAMETERS: HCPCS | Performed by: NURSE PRACTITIONER

## 2022-02-11 PROCEDURE — 1036F TOBACCO NON-USER: CPT | Performed by: NURSE PRACTITIONER

## 2022-02-11 PROCEDURE — G8484 FLU IMMUNIZE NO ADMIN: HCPCS | Performed by: NURSE PRACTITIONER

## 2022-02-11 PROCEDURE — G8427 DOCREV CUR MEDS BY ELIG CLIN: HCPCS | Performed by: NURSE PRACTITIONER

## 2022-02-11 RX ORDER — FERROUS SULFATE 325(65) MG
325 TABLET ORAL 2 TIMES DAILY
Qty: 180 TABLET | Refills: 6 | Status: SHIPPED | OUTPATIENT
Start: 2022-02-11 | End: 2022-03-11

## 2022-02-11 NOTE — PROGRESS NOTES
Latisha Haddad  [birth year]22    YOB: 2000    2022   Patient's last menstrual period was 10/10/2021 (approximate). 19w2d        Primary Care Physician: No primary care provider on file. CC: Initial Prenatal Visit    Subjective:     Abdoul Le is a 24 y.o. female     is being seen today for her first obstetrical visit. This is not a planned pregnancy. She is at 19w2d  Her obstetrical history is significant for late prenatal care, elevated GTT, Orthostatic hypotension, anemia, primigravida, WPW     Relationship with FOB: significant other, living together. Patient does intend to breast feed. Pregnancy history fully reviewed. Mother's ethnicity:   Father's ethnicity:       Objective:   Last menstrual period 10/10/2021, not currently breastfeeding.     OB History    Para Term  AB Living   1 0 0 0 0 0   SAB IAB Ectopic Molar Multiple Live Births   0 0 0 0 0 0      # Outcome Date GA Lbr Antony/2nd Weight Sex Delivery Anes PTL Lv   1 Current                Past Medical History:   Diagnosis Date    Dizziness 2015    Martha-Parkinson-White pattern 2013     Past Surgical History:   Procedure Laterality Date    ABLATION OF DYSRHYTHMIC FOCUS        Social History     Socioeconomic History    Marital status: Single     Spouse name: Not on file    Number of children: Not on file    Years of education: Not on file    Highest education level: Not on file   Occupational History    Not on file   Tobacco Use    Smoking status: Never Smoker    Smokeless tobacco: Never Used   Substance and Sexual Activity    Alcohol use: No    Drug use: No    Sexual activity: Not on file   Other Topics Concern    Not on file   Social History Narrative    Not on file     Social Determinants of Health     Financial Resource Strain:     Difficulty of Paying Living Expenses: Not on file   Food Insecurity:     Worried About Running Out of Food in the Last Year: Not on file    [address] in the Last Year: Not on file   Transportation Needs:     Lack of Transportation (Medical): Not on file    Lack of Transportation (Non-Medical): Not on file   Physical Activity:     Days of Exercise per Week: Not on file    Minutes of Exercise per Session: Not on file   Stress:     Feeling of Stress : Not on file   Social Connections:     Frequency of Communication with Friends and Family: Not on file    Frequency of Social Gatherings with Friends and Family: Not on file    Attends Denominational Services: Not on file    Active Member of [address] [place] [address] [employer] or Organizations: Not on file    Attends Club or Organization Meetings: Not on file    Marital Status: Not on file   Intimate Partner Violence:     Fear of Current or Ex-Partner: Not on file    Emotionally Abused: Not on file    Physically Abused: Not on file    Sexually Abused: Not on file   Housing Stability:     Unable to Pay for Housing in the Last Year: Not on file    Number of [place] in the Last Year: Not on file    Unstable Housing in the Last Year: Not on file     Family History   Problem Relation Age of Onset    Hypertension Mother     Diabetes Father     Hypertension Father        MEDICATIONS:  Current Outpatient Medications   Medication Sig Dispense Refill    pyridoxine (RA VITAMIN B-6) 50 MG tablet Take 1 tablet by mouth 2 times daily 60 tablet 3    Prenat-FeAsp-Meth-FA-DHA w/o A (PRENATE PIXIE) 10-0.6-0.4-200 MG CAPS Take 1 tablet by mouth daily 90 capsule 3    doxyLAMINE succinate (GNP SLEEP AID) 25 MG tablet Take 1 tablet by mouth nightly (Patient not taking: Reported on 1/18/2022) 21 tablet 0    Pyridoxine HCl (B-6) 50 MG TABS Take 1 tablet by mouth nightly (Patient not taking: Reported on 1/18/2022) 21 tablet 0     No current facility-administered medications for this visit.            ALLERGIES:  Allergies as of 02/11/2022    (No Known Allergies)           Physical Exam Completed-See Epic Navigator    Chaperone for Intimate Exam   Chaperone was offered and accepted as part of the rooming process.  Chaperone: Karyle Lance MA               Assessment:      Pregnancy at 23 and 2/7 weeks    Diagnosis Orders   1. 19 weeks gestation of pregnancy  PAP Smear    Culture, Genital    C.trachomatis N.gonorrhoeae DNA           Patient Active Problem List    Diagnosis Date Noted    Anemia 2022     Priority: High     2022 ferrous sulfate 325mg PO BID      Abnormal glucose tolerance test (GTT) 2022     Priority: High     2022 3 hour GTT and Hgb a1c      Primigravida, antepartum 2022     Priority: High    WPW (Martha-Parkinson-White syndrome) 2016     s/p ablation       Dysautonomia orthostatic hypotension syndrome     GERD (gastroesophageal reflux disease) 2014                    Plan:      Initial labs drawn. Pap smear collected  Prenatal vitamins. Problem list reviewed and updated. NT Screen/Quad Testing and MSAFP Single Marker: requested  Role of ultrasound in pregnancy discussed; fetal survey: requests  Amniocentesis discussed: Not indicated  NIPT Testing not indicated  Cultures & Wet Prep Collected  Follow-up in 4 weeks  Repeat Annual every 1 year  Cervical Cytology Evaluation begins at 24years old. If Negative Cytology, Follow-up screening per current guidelines. MFM appointment scheduled      COUNSELING COMPLETED:  INITIAL OBSTETRICAL VISIT EVALUATION:  The patient was seen full history and physical was completed/reviewed. Cytology was collected for patients over 24years of age. Cultures were collected. The patient was counseled on office policies and she was counseled on termination of pregnancy in the state of [place]. The patient was counseled on Toxoplasmosis, HIV, Tobacco Abuse, Group Beta Strep Infections, Cystic Fibrosis,  Labor precautions and Sickle Cell disease. The patient was counseled on the risks of tobacco abuse.  Both maternal and fetal. She was instructed to stop smoking if currently using tobacco. Morbidity, mortality, and cessation programs were reviewed. The risks include but are not limited to increased risks of  labor,  delivery, premature rupture of membranes, intrauterine growth restriction, intrauterine fetal demise and abruptio placenta. Secondary smoke risks were also reviewed. Increases in cancer, respiratory problems, and sudden infant death syndrome were reviewed as well. The patient was informed of a 2-4% risk of congenital anomalies in the general population. She was also informed that karyotyping is the only way to evaluate the fetus for genetic problems and genetic lethal anomalies. Chorionic villous sampling, amniocentesis and NIPT testing were also discussed with morbidity rates in detail. She requested the procedure. Route of delivery and counseling on vaginal, operative vaginal, and  sections were completed with the risks of each to both the patient as well as her baby. The possibility of a blood transfusion was discussed as well. The patient was not opposed to receiving a transfusion if needed. Nuchal translucency/Quad Evaluation and MSAFP single marker testing was reviewed in detail with attention to timing of testing and their windows. For patients beyond the gestational age for Nuchal translucency evaluation Quad testing was recommended. Timing for the Quad test was reviewed. Benefits of the above testing was reviewed. A second trimester amniocentesis was also made available to the patient. Risks, Benefits and non-invasive alternative testing was reviewed. The literature regarding a questionable link to pitocin augmentation and induction of labor, the assistance of labor contractions and the initiation of contractions to help delivery, have been reviewed with the patient regarding the increased potential of having a  with Attention Deficit Hyperactivity Disorder and or Autism.  These two disorders and the ramifications of their impact on a child and the family caring for that child has been reviewed with the patient in detail. She was given the risks, benefits and alternatives of the use of this medication. She has agreed to its use in the delivery of her unborn child if needed at the time of delivery, Yes. The patient was questioned in detail regarding any genetic misnomer history, chromosomal abnormalities, or learning disabilities in  herself, the father of the baby or their families. SHE DENIED ANY HISTORY AS STATED ABOVE: Yes    Upon completion of the visit all questions were answered and the patients follow-up and testing schedule were reviewed. Prenatal vitamins were given. T-dap Vaccine recommendations reviewed with the patient. Patient notified of timing of vaccination 27-36 weeks gestation. Patient aware Vaccine is NOT Live. Yes. The patient, Antolin Cedillo is a 24 y.o. female, was seen with a total time spent of 30 minutes for the visit on this date of service by the E/M provider. The time component had both face to face and non face to face time spent in determining the total time component. Counseling and education regarding her diagnosis listed below and her options regarding those diagnoses were also included in determining her time component. Diagnosis Orders   1. 19 weeks gestation of pregnancy  PAP Smear    Culture, Genital    C.trachomatis N.gonorrhoeae DNA        The patient had her preventative health maintenance recommendations and follow-up reviewed with her at the completion of her visit.

## 2022-02-12 LAB — HIV AG/AB: NONREACTIVE

## 2022-02-14 ENCOUNTER — TELEPHONE (OUTPATIENT)
Dept: OBGYN CLINIC | Age: 22
End: 2022-02-14

## 2022-02-14 PROBLEM — O99.820 GBS (GROUP B STREPTOCOCCUS CARRIER), +RV CULTURE, CURRENTLY PREGNANT: Status: ACTIVE | Noted: 2022-02-14

## 2022-02-14 LAB
C TRACH DNA GENITAL QL NAA+PROBE: NEGATIVE
CULTURE: ABNORMAL
Lab: ABNORMAL
N. GONORRHOEAE DNA: NEGATIVE
SPECIMEN DESCRIPTION: ABNORMAL
SPECIMEN DESCRIPTION: NORMAL

## 2022-02-14 NOTE — TELEPHONE ENCOUNTER
----- Message from Leonel Hatchet, APRN - CNP sent at 2/14/2022  8:06 AM EST -----  +GBS- treat in labor per protocol  + candida- OTC monistat if patient is symptomatic

## 2022-02-14 NOTE — TELEPHONE ENCOUNTER
Per Tina Ponce pt notified of +GBS- treat in labor per protocol  + candida-pt instructed to use OTC monistat if patient is symptomatic.

## 2022-02-22 LAB — CYTOLOGY REPORT: NORMAL

## 2022-02-23 ENCOUNTER — HOSPITAL ENCOUNTER (OUTPATIENT)
Age: 22
Setting detail: SPECIMEN
Discharge: HOME | End: 2022-02-23
Payer: COMMERCIAL

## 2022-02-23 ENCOUNTER — TELEPHONE (OUTPATIENT)
Dept: OBGYN CLINIC | Age: 22
End: 2022-02-23

## 2022-02-23 ENCOUNTER — ROUTINE PRENATAL (OUTPATIENT)
Dept: PERINATAL CARE | Age: 22
End: 2022-02-23
Payer: COMMERCIAL

## 2022-02-23 VITALS
DIASTOLIC BLOOD PRESSURE: 68 MMHG | BODY MASS INDEX: 23.22 KG/M2 | WEIGHT: 136 LBS | SYSTOLIC BLOOD PRESSURE: 117 MMHG | RESPIRATION RATE: 16 BRPM | HEART RATE: 94 BPM | HEIGHT: 64 IN | TEMPERATURE: 97.2 F

## 2022-02-23 DIAGNOSIS — O35.03X0 CHOROID PLEXUS CYST OF FETUS AFFECTING CARE OF MOTHER, ANTEPARTUM, SINGLE OR UNSPECIFIED FETUS: ICD-10-CM

## 2022-02-23 DIAGNOSIS — O99.810 ABNORMAL MATERNAL GLUCOSE TOLERANCE, ANTEPARTUM: ICD-10-CM

## 2022-02-23 DIAGNOSIS — O16.2 HYPERTENSION AFFECTING PREGNANCY IN SECOND TRIMESTER: ICD-10-CM

## 2022-02-23 DIAGNOSIS — Z3A.21 21 WEEKS GESTATION OF PREGNANCY: ICD-10-CM

## 2022-02-23 DIAGNOSIS — O99.412 MATERNAL CARDIOVASCULAR DISEASE AFFECTING PREGNANCY IN SECOND TRIMESTER: Primary | ICD-10-CM

## 2022-02-23 DIAGNOSIS — Z36.86 SCREENING, ANTENATAL, FOR RISK OF PRE-TERM LABOR: ICD-10-CM
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
## 2022-02-23 PROBLEM — G93.0 CHOROID PLEXUS CYST: Status: ACTIVE | Noted: 2022-02-23

## 2022-02-23 PROBLEM — I10 CHRONIC HYPERTENSION: Status: ACTIVE | Noted: 2022-02-23

## 2022-02-23 LAB
ABDOMINAL CIRCUMFERENCE: NORMAL
ABDOMINAL CIRCUMFERENCE: NORMAL
BIPARIETAL DIAMETER: NORMAL
BIPARIETAL DIAMETER: NORMAL
CREATININE URINE: 164.9 MG/DL (ref 28–217)
ESTIMATED FETAL WEIGHT: NORMAL
ESTIMATED FETAL WEIGHT: NORMAL
FEMORAL DIAMETER: NORMAL
FEMORAL DIAMETER: NORMAL
HC/AC: NORMAL
HC/AC: NORMAL
HEAD CIRCUMFERENCE: NORMAL
HEAD CIRCUMFERENCE: NORMAL
SEND OUT REPORT: NORMAL
TEST NAME: NORMAL

## 2022-02-23 PROCEDURE — G8484 FLU IMMUNIZE NO ADMIN: HCPCS | Performed by: OBSTETRICS & GYNECOLOGY

## 2022-02-23 PROCEDURE — G8427 DOCREV CUR MEDS BY ELIG CLIN: HCPCS | Performed by: OBSTETRICS & GYNECOLOGY

## 2022-02-23 PROCEDURE — G8420 CALC BMI NORM PARAMETERS: HCPCS | Performed by: OBSTETRICS & GYNECOLOGY

## 2022-02-23 PROCEDURE — 99244 OFF/OP CNSLTJ NEW/EST MOD 40: CPT | Performed by: OBSTETRICS & GYNECOLOGY

## 2022-02-23 PROCEDURE — 76817 TRANSVAGINAL US OBSTETRIC: CPT | Performed by: OBSTETRICS & GYNECOLOGY

## 2022-02-23 PROCEDURE — 82570 ASSAY OF URINE CREATININE: CPT

## 2022-02-23 PROCEDURE — 36415 COLL VENOUS BLD VENIPUNCTURE: CPT

## 2022-02-23 PROCEDURE — 82105 ALPHA-FETOPROTEIN SERUM: CPT

## 2022-02-23 PROCEDURE — 76811 OB US DETAILED SNGL FETUS: CPT | Performed by: OBSTETRICS & GYNECOLOGY

## 2022-02-23 NOTE — PROGRESS NOTES
MFM Interval Note    Patient seen and evaluated. She states that she fell on her right hip in the parking lot on her way in. Her hip is sore and she has some pain on the right side of her abdomen. She denies any contractions, leaking fluid or vaginal bleeding. She has positive fetal movement. She was able to get up and walk up here on her own. Patient counseled that at this time it is recommended that she go to the ER to be evaluated before her apt and she can come back after. Patient declining at this time and would like to have her apt first and if she feels that she needs further evaluation she will go to the ED after her apt. Patient given strict return precautions including increases in abdominal pain, vaginal bleeding, leaking fluid or decreased fetal movement. Dr. Gemini Azul updated.     Cheyenne Nugent DO, PGY-2  Ob/Gyn Resident  [address]  02/23/22

## 2022-02-24 ENCOUNTER — TELEPHONE (OUTPATIENT)
Dept: OBGYN CLINIC | Age: 22
End: 2022-02-24

## 2022-02-24 NOTE — TELEPHONE ENCOUNTER
Per Satish Ji NP, followed up on [facility] recommendations of taking baby ASA daily, f/u @ 26 weeks, and right sided choroid plexus cyst noted. Pt verbalized understanding and will  baby ASA and start taking it.

## 2022-02-25 LAB
AFP INTERPRETATION: NORMAL
AFP MOM: 0.75
AFP SPECIMEN: NORMAL
AFP: 56 NG/ML
DATE OF BIRTH: NORMAL
DATING METHOD: NORMAL
DETERMINED BY: NORMAL
DIABETIC: NEGATIVE
DONOR EGG?: NORMAL
DUE DATE: NORMAL
ESTIMATED DUE DATE: NORMAL
FAMILY HISTORY NTD: NEGATIVE
GESTATIONAL AGE: NORMAL
IN VITRO FERTILIZATION: NORMAL
INSULIN REQ DIABETES: NO
LAST MENSTRUAL PERIOD: NORMAL
MATERNAL AGE AT EDD: 21.7 YR
MATERNAL WEIGHT: 136
MONOCHORIONIC TWINS: NEGATIVE
NUMBER OF FETUSES: NORMAL
PATIENT WEIGHT UNITS: NORMAL
PATIENT WEIGHT: NORMAL
RACE (MATERNAL): NORMAL
RACE: NORMAL
REPEAT SPECIMEN?: NEGATIVE
SMOKING: NORMAL
SMOKING: NORMAL
VALPROIC/CARBAMAZEP: NORMAL
ZZ NTE CLEAN UP: HISTORY: NO

## 2022-03-11 ENCOUNTER — ROUTINE PRENATAL (OUTPATIENT)
Dept: OBGYN CLINIC | Age: 22
End: 2022-03-11
Payer: COMMERCIAL

## 2022-03-11 VITALS
WEIGHT: 136 LBS | SYSTOLIC BLOOD PRESSURE: 116 MMHG | BODY MASS INDEX: 23.34 KG/M2 | DIASTOLIC BLOOD PRESSURE: 70 MMHG | HEART RATE: 82 BPM

## 2022-03-11 DIAGNOSIS — D64.9 ANEMIA, UNSPECIFIED TYPE: Primary | ICD-10-CM

## 2022-03-11 DIAGNOSIS — Z3A.23 23 WEEKS GESTATION OF PREGNANCY: ICD-10-CM

## 2022-03-11 DIAGNOSIS — O09.30 LATE PRENATAL CARE: ICD-10-CM

## 2022-03-11 DIAGNOSIS — O99.820 GBS (GROUP B STREPTOCOCCUS CARRIER), +RV CULTURE, CURRENTLY PREGNANT: ICD-10-CM

## 2022-03-11 DIAGNOSIS — R73.09 ABNORMAL GLUCOSE TOLERANCE TEST (GTT): ICD-10-CM

## 2022-03-11 PROCEDURE — 99213 OFFICE O/P EST LOW 20 MIN: CPT | Performed by: NURSE PRACTITIONER

## 2022-03-11 PROCEDURE — G8484 FLU IMMUNIZE NO ADMIN: HCPCS | Performed by: NURSE PRACTITIONER

## 2022-03-11 PROCEDURE — G8427 DOCREV CUR MEDS BY ELIG CLIN: HCPCS | Performed by: NURSE PRACTITIONER

## 2022-03-11 PROCEDURE — G8420 CALC BMI NORM PARAMETERS: HCPCS | Performed by: NURSE PRACTITIONER

## 2022-03-11 PROCEDURE — 1036F TOBACCO NON-USER: CPT | Performed by: NURSE PRACTITIONER

## 2022-03-11 RX ORDER — IRON/C/B12/FOLATE/ZINC/SUCCIN 75MG-175MG
1 TABLET ORAL DAILY
Qty: 30 TABLET | Refills: 9 | Status: SHIPPED | OUTPATIENT
Start: 2022-03-11 | End: 2022-04-08

## 2022-03-11 NOTE — PROGRESS NOTES
Elia Iqbal is a 24 y.o. female 23w2d        OB History    Para Term  AB Living   1             SAB IAB Ectopic Molar Multiple Live Births                    # Outcome Date GA Lbr Antony/2nd Weight Sex Delivery Anes PTL Lv   1 Current                Vitals  BP: 116/70  Weight: 136 lb (61.7 kg)  Pulse: 82  Patient Position: Sitting  Albumin: Negative  Glucose: Negative    The patient was seen and evaluated. There was positive fetal movements. No contractions or leakage of fluid. Signs and symptoms of  labor as well as labor were reviewed. The Nuchal Translucency testing was reviewed and found to be not completed. A single marker MSAFP was reviewed and found to be normal. The patients anatomy ultrasound has been completed and reviewed with patient. TOP ST OH Reviewed. A 28 week lab panel was ordered. This includes a (HH, 1 hr GTT, U/A C&S). The patient is to complete this in the next two to four weeks. The S/S of Pre-Eclampsia were reviewed with the patient in detail. She is to report any of these if they occur. She currently denies any of these. The patient is RH positive Rhogam Ordered no    The patient was instructed on fetal kick counts and was given a kick sheet to complete every 8 hours. This is to begin at 28 weeks gestation. She was instructed that the baby should move at a minimum of ten times within one hour after a meal. The patient was instructed to lay down on her left side twenty minutes after eating and count movements for up to one hour with a target value of ten movements. She was instructed to notify the office if she did not make that target after two attempts or if after any attempt there was less than four movements. ASA 81 mg per MFM for the prevention of preeclampsia per MFM based on the ACOG/USPSTF guidelines     22 PRAF completed      Assessment:  1. Elia Iqbal is a 24 y.o. female  2.    3. 23w2d    Patient Active Problem List    Diagnosis Date Noted    GBS Positive 02/14/2022     Priority: High     Treat in labor per protocol      Anemia 02/11/2022     Priority: High     2/11/2022 ferrous sulfate 325mg PO BID      Elv Early 1 hr GTT 02/11/2022     Priority: High     2/11/2022 3 hour GTT and Hgb a1c      Late prenatal care 02/11/2022     Priority: High    Fetal Choroid plexus cyst 02/23/2022     Noted on anatomy ultrasound 2/23/22      S/p Fall in Parking lot 2/23/22 02/23/2022     Landed on right hip and right side  MFM scan unremarkable  Patient declined ER   Given strict return precautions      cHTN (no meds) 02/23/2022     Diagnosed off two elevated blood pressures: one in 2018 and one in 2019      Martha-Parkinson-White 12/09/2016     s/p ablation   Fetal echo: **      Orthostatic Hypotension      GERD 11/25/2014        Diagnosis Orders   1. Anemia, unspecified type  [id number]-Zn (FERIVA 21/7) 75-1 MG TABS   2. Abnormal glucose tolerance test (GTT)     3. Late prenatal care     4. GBS (group B Streptococcus carrier), +RV culture, currently pregnant     5. 23 weeks gestation of pregnancy  Glucose Tolerance, 3 Hours    Hemoglobin A1C    CBC with Auto Differential    Urinalysis with Reflex to Culture    [id number]-DSS-SuccAc-Zn (FERIVA 21/7) 75-1 MG TABS         Plan:  The patient will return to the office for her next visit in 4 weeks. See antepartum flow sheet. Lab slip given for 3 hour GTT/ HGb a1c. 28 week labs given. Could not tolerate ferrous sulfate QD. Script sent for [facility] sent, samples given. Patient was seen with total face to face time of 20 minutes. More than 50% of this visit was on counseling and education regarding her    Diagnosis Orders   1. Anemia, unspecified type  [id number]-DSS-SuccAc-Zn (FERIVA 21/7) 75-1 MG TABS   2. Abnormal glucose tolerance test (GTT)     3. Late prenatal care     4.  GBS (group B Streptococcus carrier), +RV culture, currently pregnant     5. 23 weeks gestation of pregnancy  Glucose Tolerance, 3 Hours    Hemoglobin A1C    CBC with Auto Differential    Urinalysis with Reflex to Culture    [id number]-C-DSS-SuccAc-Zn (FERIVA 21/7) 75-1 MG TABS    and her options. She was also counseled on her preventative health maintenance recommendations and follow-up.

## 2022-03-15 RX ORDER — METHYLDOPA 500 MG
160 TABLET ORAL 2 TIMES DAILY
Qty: 60 TABLET | Refills: 6 | Status: SHIPPED | OUTPATIENT
Start: 2022-03-15 | End: 2023-05-27
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
## 2022-03-30 ENCOUNTER — ROUTINE PRENATAL (OUTPATIENT)
Dept: PERINATAL CARE | Age: 22
End: 2022-03-30
Payer: COMMERCIAL

## 2022-03-30 VITALS
HEIGHT: 64 IN | SYSTOLIC BLOOD PRESSURE: 111 MMHG | RESPIRATION RATE: 16 BRPM | TEMPERATURE: 97.2 F | HEART RATE: 76 BPM | BODY MASS INDEX: 23.56 KG/M2 | DIASTOLIC BLOOD PRESSURE: 70 MMHG | WEIGHT: 138 LBS

## 2022-03-30 DIAGNOSIS — O36.5920 INTRAUTERINE GROWTH RESTRICTION (IUGR) AFFECTING CARE OF MOTHER, SECOND TRIMESTER, SINGLE GESTATION: ICD-10-CM

## 2022-03-30 DIAGNOSIS — O99.810 ABNORMAL MATERNAL GLUCOSE TOLERANCE, ANTEPARTUM: ICD-10-CM

## 2022-03-30 DIAGNOSIS — Z36.4 ANTENATAL SCREENING FOR FETAL GROWTH RETARDATION USING ULTRASONICS: ICD-10-CM

## 2022-03-30 DIAGNOSIS — O35.03X0 CHOROID PLEXUS CYST OF FETUS AFFECTING CARE OF MOTHER, ANTEPARTUM, SINGLE OR UNSPECIFIED FETUS: ICD-10-CM

## 2022-03-30 DIAGNOSIS — O99.412 MATERNAL CARDIOVASCULAR DISEASE AFFECTING PREGNANCY IN SECOND TRIMESTER: Primary | ICD-10-CM

## 2022-03-30 DIAGNOSIS — Z3A.26 26 WEEKS GESTATION OF PREGNANCY: ICD-10-CM

## 2022-03-30 DIAGNOSIS — Z03.73 SUSPECTED FETAL ANOMALY NOT FOUND: ICD-10-CM

## 2022-03-30 DIAGNOSIS — O16.2 HYPERTENSION AFFECTING PREGNANCY IN SECOND TRIMESTER: ICD-10-CM

## 2022-03-30 PROCEDURE — 76821 MIDDLE CEREBRAL ARTERY ECHO: CPT | Performed by: OBSTETRICS & GYNECOLOGY

## 2022-03-30 PROCEDURE — 93325 DOPPLER ECHO COLOR FLOW MAPG: CPT | Performed by: OBSTETRICS & GYNECOLOGY

## 2022-03-30 PROCEDURE — 76820 UMBILICAL ARTERY ECHO: CPT | Performed by: OBSTETRICS & GYNECOLOGY

## 2022-03-30 PROCEDURE — 76827 ECHO EXAM OF FETAL HEART: CPT | Performed by: OBSTETRICS & GYNECOLOGY

## 2022-03-30 PROCEDURE — 76825 ECHO EXAM OF FETAL HEART: CPT | Performed by: OBSTETRICS & GYNECOLOGY

## 2022-03-30 PROCEDURE — 76816 OB US FOLLOW-UP PER FETUS: CPT | Performed by: OBSTETRICS & GYNECOLOGY

## 2022-03-30 PROCEDURE — 99999 PR OFFICE/OUTPT VISIT,PROCEDURE ONLY: CPT | Performed by: OBSTETRICS & GYNECOLOGY

## 2022-04-07 ENCOUNTER — ROUTINE PRENATAL (OUTPATIENT)
Dept: OBGYN CLINIC | Age: 22
End: 2022-04-07
Payer: COMMERCIAL

## 2022-04-07 VITALS
BODY MASS INDEX: 23.69 KG/M2 | WEIGHT: 138 LBS | SYSTOLIC BLOOD PRESSURE: 96 MMHG | HEART RATE: 82 BPM | DIASTOLIC BLOOD PRESSURE: 62 MMHG

## 2022-04-07 DIAGNOSIS — Z3A.27 27 WEEKS GESTATION OF PREGNANCY: Primary | ICD-10-CM

## 2022-04-07 DIAGNOSIS — I10 CHRONIC HYPERTENSION: ICD-10-CM

## 2022-04-07 DIAGNOSIS — O99.820 GBS (GROUP B STREPTOCOCCUS CARRIER), +RV CULTURE, CURRENTLY PREGNANT: ICD-10-CM

## 2022-04-07 DIAGNOSIS — O09.30 LATE PRENATAL CARE: ICD-10-CM

## 2022-04-07 DIAGNOSIS — R73.09 ABNORMAL GLUCOSE TOLERANCE TEST (GTT): ICD-10-CM

## 2022-04-07 PROCEDURE — 99213 OFFICE O/P EST LOW 20 MIN: CPT | Performed by: OBSTETRICS & GYNECOLOGY

## 2022-04-07 PROCEDURE — 1036F TOBACCO NON-USER: CPT | Performed by: OBSTETRICS & GYNECOLOGY

## 2022-04-07 PROCEDURE — G8427 DOCREV CUR MEDS BY ELIG CLIN: HCPCS | Performed by: OBSTETRICS & GYNECOLOGY

## 2022-04-07 PROCEDURE — G8420 CALC BMI NORM PARAMETERS: HCPCS | Performed by: OBSTETRICS & GYNECOLOGY

## 2022-04-07 NOTE — PROGRESS NOTES
Ciera Mac is a 24 y.o. female 27w3d        OB History    Para Term  AB Living   1             SAB IAB Ectopic Molar Multiple Live Births                    # Outcome Date GA Lbr Antony/2nd Weight Sex Delivery Anes PTL Lv   1 Current                Vitals  BP: 96/62  Weight: 138 lb (62.6 kg)  Pulse: 82  Patient Position: Sitting  Albumin: Trace  Glucose: Negative      The patient was seen and evaluated. There was positive fetal movements. No contractions or leakage of fluid. Signs and symptoms of  labor as well as labor were reviewed. The S/S of Pre-Eclampsia were reviewed with the patient in detail. She is to report any of these if they occur. She currently denies any of these. The patient had her 28 week labs ordered. No visits with results within 5 Week(s) from this visit.    Latest known visit with results is:   Hospital Outpatient Visit on 2022   Component Date Value Ref Range Status    Date of Birth 2022 2000   Final    Maternal Weight 2022 136   Final    Patient Weight Units 2022 LBS   Final    Due Date 2022 SEE NOTE   Final    Comment: Results for Estimated Due Date: 22       Determined by 2022 Ultrasound   Final    Last Menstrual Period 2022 10/10/2021   Final    Monochorionic Twins 2022 NEGATIVE   Final    Race (Maternal) 2022     Final    Diabetic 2022 NEGATIVE   Final    Smoking 2022 INFORMATION NOT PROVIDED   Final    Valproic/Carbamazep 2022 INFORMATION NOT PROVIDED   Final    Fam HX NTD 2022 NEGATIVE   Final    In Vitro Fertilization 2022 INFORMATION NOT PROVIDED   Final    Donor Egg? 2022 INFORMATION NOT PROVIDED   Final    Repeat Specimen? 2022 NEGATIVE   Final    AFP (Alpha Fetoprotein) 2022 56  ng/mL Final    AFP Mom 2022 0.75   Final    AFP Interp 2022 Screen Neg   Final    Comment: (NOTE)  INTERPRETATION: SCREEN NEGATIVE for open spina bifida                               Neural Tube Defects (NTD)   Negative                                                               Pre-Test     Post-Test    Cutoff                                      Neural Tube Defects Risks   1:1030       < 1:94209    1:250                                       Comments: The risk of an open neural tube defect is less than the  screening cut-off. This test was developed and its performance characteristics   determined by [facility]. It has not been cleared or   approved by the [employer] and Drug Administration. This test was   performed in a CLIA certified laboratory and is intended for   clinical purposes.  Maternal Age At [place] 2/23/2022 21.7  yr Final    Patient Weight 02/23/2022 136.0 lbs. Final    Est Due Date 02/23/2022 07/06/2022   Final    Gestational Age 02/23/2022 21 wks, 0 days   Final    Dating Method 02/23/2022 US   Final    Number of Fetuses 02/23/2022 [place]   Final    Race 02/23/2022 Nonblack   Final    Insulin Req Diabetes 02/23/2022 No   Final    Smoking 02/23/2022 Unknown   Final    History 02/23/2022 No   Final    AFP Specimen 02/23/2022 See Note   Final    Comment: (NOTE)  Initial sample  Performed by Preston Brown , [address] [facility] [phone]  www. [facility] Aftab Fisher MD, Lab. Director      Test Name 02/23/2022 UNITY   Final    Send Out Report 02/23/2022 FORWARD UNIT KIT   Final    Creatinine, Ur 02/23/2022 164.9  28.0 - 217.0 mg/dL Final   ]    ASA 81 mg per MFM for the prevention of preeclampsia per MFM based on the ACOG/USPSTF guidelines     1/18/22 PRAF completed      T-Dap Vaccine (27-36 weeks): awaiting    The patient was instructed on fetal kick counts and was given a kick sheet to complete every 8 hours.  She was instructed that the baby should move at a minimum of ten times within one hour after a meal. The patient was instructed to lay down on her left side twenty minutes after eating and count movements for up to one hour with a target value of ten movements. She was instructed to notify the office if she did not make that target after two attempts or if after any attempt there was less than four movements. The patient reports that the targets have been made Yes.  Testing:  [facility] at [facility] at 32 weeks d/t CHTN    Assessment:  Selina Hendricks is a 24 y.o. female  2.   3. 27w1d    Patient Active Problem List    Diagnosis Date Noted    GBS Positive 2022     Priority: High     Treat in labor per protocol      Anemia 2022     Priority: High     2022 ferrous sulfate 325mg PO BID      Elv Early 1 hr GTT 2022     Priority: High     2022 3 hour GTT and Hgb a1c      Late prenatal care 2022     Priority: High    Fetal Choroid plexus cyst 2022     Noted on anatomy ultrasound 22      cHTN (no meds) 2022     Diagnosed off two elevated blood pressures: one in  and one in 2019      Martha-Parkinson-White 2016     s/p ablation   Fetal echo: **      Orthostatic Hypotension      GERD 2014        Diagnosis Orders   1. 27 weeks gestation of pregnancy     2. Abnormal glucose tolerance test (GTT)     3. Late prenatal care     4. GBS (group B Streptococcus carrier), +RV culture, currently pregnant               Plan:  The patient will return to the office for her next visit in 3 weeks. See antepartum flow sheet.  Testing Indicated: Yes  Scheduled with Nursing-Pt notified: Yes      Patient was seen with total face to face time of 2- minutes. More than 50% of this visit was on counseling and education regarding her    Diagnosis Orders   1. 27 weeks gestation of pregnancy     2. Abnormal glucose tolerance test (GTT)     3. Late prenatal care     4. GBS (group B Streptococcus carrier), +RV culture, currently pregnant      and her options.  She was also counseled on her preventative health maintenance recommendations and follow-up.

## 2022-05-03 ENCOUNTER — HOSPITAL ENCOUNTER (EMERGENCY)
Age: 22
Discharge: HOME | DRG: 566 | End: 2022-05-03
Attending: EMERGENCY MEDICINE
Payer: COMMERCIAL

## 2022-05-03 ENCOUNTER — HOSPITAL ENCOUNTER (INPATIENT)
Age: 22
LOS: 1 days | Discharge: HOME | DRG: 566 | End: 2022-05-03
Attending: SPECIALIST | Admitting: SPECIALIST
Payer: COMMERCIAL

## 2022-05-03 VITALS
RESPIRATION RATE: 15 BRPM | HEIGHT: 64 IN | WEIGHT: 135 LBS | OXYGEN SATURATION: 99 % | TEMPERATURE: 100 F | BODY MASS INDEX: 23.05 KG/M2 | HEART RATE: 118 BPM | DIASTOLIC BLOOD PRESSURE: 82 MMHG | SYSTOLIC BLOOD PRESSURE: 117 MMHG

## 2022-05-03 VITALS
DIASTOLIC BLOOD PRESSURE: 61 MMHG | SYSTOLIC BLOOD PRESSURE: 101 MMHG | HEART RATE: 127 BPM | OXYGEN SATURATION: 98 % | TEMPERATURE: 98.8 F | RESPIRATION RATE: 16 BRPM

## 2022-05-03 DIAGNOSIS — J06.9 UPPER RESPIRATORY TRACT INFECTION, UNSPECIFIED TYPE: Primary | ICD-10-CM

## 2022-05-03 PROBLEM — O21.9 NAUSEA/VOMITING IN PREGNANCY: Status: ACTIVE | Noted: 2022-05-03

## 2022-05-03 PROBLEM — N39.0 UTI (URINARY TRACT INFECTION): Status: ACTIVE | Noted: 2022-05-03

## 2022-05-03 PROBLEM — E87.6 HYPOKALEMIA: Status: ACTIVE | Noted: 2022-05-03

## 2022-05-03 LAB
-: ABNORMAL
ALBUMIN SERPL-MCNC: 3.9 G/DL (ref 3.5–5.2)
ALP BLD-CCNC: 82 U/L (ref 35–104)
ALT SERPL-CCNC: 11 U/L (ref 5–33)
ANION GAP SERPL CALCULATED.3IONS-SCNC: 15 MMOL/L (ref 9–17)
AST SERPL-CCNC: 22 U/L
BACTERIA: ABNORMAL
BILIRUB SERPL-MCNC: 0.36 MG/DL (ref 0.3–1.2)
BILIRUBIN URINE: ABNORMAL
BUN BLDV-MCNC: 5 MG/DL (ref 6–20)
CALCIUM SERPL-MCNC: 8.8 MG/DL (ref 8.6–10.4)
CHLORIDE BLD-SCNC: 96 MMOL/L (ref 98–107)
CO2: 20 MMOL/L (ref 20–31)
COLOR: ABNORMAL
CREAT SERPL-MCNC: 0.5 MG/DL (ref 0.5–0.9)
EPITHELIAL CELLS UA: ABNORMAL /HPF
FLU A ANTIGEN: NEGATIVE
FLU B ANTIGEN: NEGATIVE
GFR AFRICAN AMERICAN: >60 ML/MIN
GFR NON-AFRICAN AMERICAN: >60 ML/MIN
GFR SERPL CREATININE-BSD FRML MDRD: ABNORMAL ML/MIN/{1.73_M2}
GLUCOSE BLD-MCNC: 101 MG/DL (ref 70–99)
GLUCOSE URINE: ABNORMAL
KETONES, URINE: ABNORMAL
LEUKOCYTE ESTERASE, URINE: ABNORMAL
MUCUS: ABNORMAL
NITRITE, URINE: NEGATIVE
PH UA: 6.5 (ref 5–8)
POTASSIUM SERPL-SCNC: 3 MMOL/L (ref 3.7–5.3)
PROTEIN UA: ABNORMAL
RBC UA: ABNORMAL /HPF
S PYO AG THROAT QL: NEGATIVE
SARS-COV-2, RAPID: NOT DETECTED
SODIUM BLD-SCNC: 131 MMOL/L (ref 135–144)
SOURCE: NORMAL
SPECIFIC GRAVITY UA: 1.02 (ref 1–1.03)
SPECIMEN DESCRIPTION: NORMAL
TOTAL PROTEIN: 7.6 G/DL (ref 6.4–8.3)
TURBIDITY: ABNORMAL
URINE HGB: ABNORMAL
UROBILINOGEN, URINE: NORMAL
WBC UA: ABNORMAL /HPF

## 2022-05-03 PROCEDURE — 76815 OB US LIMITED FETUS(S): CPT

## 2022-05-03 PROCEDURE — 1220000000 HC SEMI PRIVATE OB R&B

## 2022-05-03 PROCEDURE — 6370000000 HC RX 637 (ALT 250 FOR IP)

## 2022-05-03 PROCEDURE — G0378 HOSPITAL OBSERVATION PER HR: HCPCS

## 2022-05-03 PROCEDURE — 80053 COMPREHEN METABOLIC PANEL: CPT

## 2022-05-03 PROCEDURE — 99213 OFFICE O/P EST LOW 20 MIN: CPT

## 2022-05-03 PROCEDURE — 99234 HOSP IP/OBS SM DT SF/LOW 45: CPT

## 2022-05-03 PROCEDURE — 6370000000 HC RX 637 (ALT 250 FOR IP): Performed by: EMERGENCY MEDICINE

## 2022-05-03 PROCEDURE — 36415 COLL VENOUS BLD VENIPUNCTURE: CPT

## 2022-05-03 PROCEDURE — 87880 STREP A ASSAY W/OPTIC: CPT

## 2022-05-03 PROCEDURE — 87086 URINE CULTURE/COLONY COUNT: CPT

## 2022-05-03 PROCEDURE — 81001 URINALYSIS AUTO W/SCOPE: CPT

## 2022-05-03 PROCEDURE — 87804 INFLUENZA ASSAY W/OPTIC: CPT

## 2022-05-03 PROCEDURE — 87635 SARS-COV-2 COVID-19 AMP PRB: CPT

## 2022-05-03 RX ORDER — POTASSIUM CHLORIDE 20 MEQ/1
40 TABLET, EXTENDED RELEASE ORAL ONCE
Status: COMPLETED | OUTPATIENT
Start: 2022-05-03 | End: 2022-05-03

## 2022-05-03 RX ORDER — ACETAMINOPHEN 325 MG/1
650 TABLET ORAL ONCE
Status: COMPLETED | OUTPATIENT
Start: 2022-05-03 | End: 2022-05-03

## 2022-05-03 RX ORDER — NITROFURANTOIN 25; 75 MG/1; MG/1
100 CAPSULE ORAL 2 TIMES DAILY
Qty: 14 CAPSULE | Refills: 0 | Status: SHIPPED | OUTPATIENT
Start: 2022-05-03 | End: 2022-05-10

## 2022-05-03 RX ORDER — POTASSIUM CHLORIDE 20 MEQ/1
20 TABLET, EXTENDED RELEASE ORAL 2 TIMES DAILY
Qty: 60 TABLET | Refills: 0 | Status: SHIPPED | OUTPATIENT
Start: 2022-05-03 | End: 2022-06-17

## 2022-05-03 RX ADMIN — ACETAMINOPHEN 650 MG: 325 TABLET ORAL at 12:35

## 2022-05-03 RX ADMIN — POTASSIUM CHLORIDE 40 MEQ: 20 TABLET, EXTENDED RELEASE ORAL at 15:59

## 2022-05-03 ASSESSMENT — PAIN SCALES - GENERAL: PAINLEVEL_OUTOF10: 6

## 2022-05-03 ASSESSMENT — LIFESTYLE VARIABLES: HOW OFTEN DO YOU HAVE A DRINK CONTAINING ALCOHOL: NEVER

## 2022-05-03 ASSESSMENT — PAIN DESCRIPTION - DESCRIPTORS: DESCRIPTORS: ACHING

## 2022-05-03 ASSESSMENT — PAIN DESCRIPTION - LOCATION: LOCATION: ABDOMEN

## 2022-05-03 ASSESSMENT — PAIN DESCRIPTION - PAIN TYPE: TYPE: ACUTE PAIN

## 2022-05-03 ASSESSMENT — PAIN DESCRIPTION - FREQUENCY: FREQUENCY: CONTINUOUS

## 2022-05-03 ASSESSMENT — PAIN - FUNCTIONAL ASSESSMENT: PAIN_FUNCTIONAL_ASSESSMENT: 0-10

## 2022-05-03 NOTE — FLOWSHEET NOTE
Admitted to room 167 from ER per wheelchair. Patient states she has had cold symptoms x3 weeks and abdominal pain when she coughs. Oriented to room and call light with understanding voiced.

## 2022-05-03 NOTE — ED TRIAGE NOTES
Pt is 30 weeks pregnant and complains of sore throat, nausea and fever. Pt states her boyfriend has it too. Worse since Saturday.

## 2022-05-03 NOTE — ED PROVIDER NOTES
[address]  EMERGENCY DEPARTMENT ENCOUNTER      Pt Name: Óscar Archuleta  MRN: [id number]  [place] 2000  Date of evaluation: 5/3/22      CHIEF COMPLAINT       Chief Complaint   Patient presents with    Cough    Fever     HISTORY OF PRESENT ILLNESS   HPI 24 y.o. female presents with complaints of cough and fever. .  The patient started getting sick on about 5 to 6 days ago. Patient is a G1, P0 at approximately 30 weeks gestation. She says that she has been having a sore throat congestion cough subjective fevers for the last 5 to 6 days. She had a upper respiratory infection about 3 weeks ago, that seemed to get better and now she has gotten sick again. Her partner is also sick with the same. She reports fatigue intermittent nausea and vomiting, she says that when she coughs she will get a momentary pain in her abdomen. She denies any other abdominal pain. She can feel the baby moving. She is having no pelvic cramping or low back pain. She denies any burning with urination leaking vaginal fluid, vaginal bleeding, or contractions. Patient reports that the last time she threw up was yesterday. She is keeping fluids down and trying to drink a lot, but does not have much of an appetite. REVIEW OF SYSTEMS       10 systems are reviewed and are negative except for those noted in the HPI. PAST MEDICAL HISTORY     Past Medical History:   Diagnosis Date    Dizziness 11/23/2015    Martha-Parkinson-White pattern 1/25/2013       SURGICAL HISTORY       Past Surgical History:   Procedure Laterality Date    ABLATION OF DYSRHYTHMIC FOCUS         CURRENT MEDICATIONS       Previous Medications    FERROUS SULFATE DRIED (SLOW RELEASE IRON) 160 (50 FE) MG TBCR EXTENDED RELEASE TABLET    Take 160 mg by mouth 2 times daily    PYRIDOXINE HCL (B-6) 50 MG TABS    Take 1 tablet by mouth nightly       ALLERGIES     has No Known Allergies. FAMILY HISTORY     She indicated that her mother is alive.  She indicated that the status of her father is unknown. SOCIAL HISTORY      reports that she has never smoked. She has never used smokeless tobacco. She reports that she does not drink alcohol and does not use drugs. PHYSICAL EXAM     INITIAL VITALS: /82   Pulse 118   Temp 100 °F (37.8 °C) (Oral)   Resp 15   Ht 5' 4\" (1.626 m)   Wt 135 lb (61.2 kg)   LMP 10/10/2021 (Approximate)   SpO2 99%   BMI 23.17 kg/m²   Gen: NAD  Head: NC/at  Eyes: PERRL, anicteric, no conjunctivitis. ENT: TM clear bilaterally. Pharynx is non-erythematous. No tonsillar hypertrophy or exudates, uvula is midline. No evidence of a pharyngeal abscess. Neck: No adenopathy. No JVD. No stridor  CVS: Tachycardic with a regular rhythm  PULM: CTA  ABD: Soft, no TTP  MSK: Normal muscle bulk. No CVA TTTP  SKIN: No rash. Neuro: A&Ox3, appropriate movement of all extremities, ambulatory with a normal gait, fluent speech. Extremities: No lower extremity edema. Appropriate capillary refill. MEDICAL DECISION MAKING:     The patient's signs and symptoms are consistent with an acute mild viral illness. The patient is nontoxic and well appearing, no evidence of hypoxia or impending respiratory failure. The patient is tolerating PO. I do not feel the patient has evidence of significant dehydration or end organ failure at this time. We ordered COVID flu strep testing and a urinalysis. The patient will go to labor and delivery for fetal monitoring and evaluation by the midwife. I spoke with the labor and delivery team and they will follow-up on the studies that we ordered and obtained here. DIAGNOSTIC RESULTS     LABS: All lab results were reviewed by myself, and all abnormals are listed below.   Labs Reviewed   STREP SCREEN GROUP A THROAT   COVID-19, RAPID   RAPID INFLUENZA A/B ANTIGENS   URINALYSIS WITH REFLEX TO CULTURE       EMERGENCY DEPARTMENT COURSE:   Vitals:    Vitals:    05/03/22 1217   BP: 117/82   Pulse: 118   Resp: 15   Temp: 100 °F (37.8 °C)   TempSrc: Oral   SpO2: 99%   Weight: 135 lb (61.2 kg)   Height: 5' 4\" (1.626 m)       The patient was given the following medications while in the emergency department:  Orders Placed This Encounter   Medications    acetaminophen (TYLENOL) tablet 650 mg     -------------------------  CRITICAL CARE:   CONSULTS: None  PROCEDURES: Procedures     FINAL IMPRESSION      1.  Upper respiratory tract infection, unspecified type          DISPOSITION/PLAN   DISPOSITION Decision To Discharge 05/03/2022 12:36:10 PM      PATIENT REFERRED TO:  Go to L&D to be evaluated by the [place] team          Nikunj Hurley [address] [id number] [phone]    If symptoms worsen      DISCHARGE MEDICATIONS:  New Prescriptions    No medications on file         Mahendra Becker MD  Attending Emergency Physician                     Mahendra Becker MD  05/03/22 5625

## 2022-05-03 NOTE — DISCHARGE SUMMARY
Obstetric Discharge Summary  TERE LOPEZ    Patient Name: Jose De Jesus Harvey  Patient : 2000  Room/Bed: 0205/9088-83  Primary Care Physician: No primary care provider on file.   Admit Date: 5/3/2022  1:48 PM  MRN #: [id number]  [facility] #: [id number]  OBGYN: Dr. Asher  Diagnosis: IUP at 30w6d, evaluated and seen as Outpatient in Triage for nausea and vomiting, cough with abdominal pain    Her pregnancy has been complicated by:   Patient Active Problem List   Diagnosis    GERD    Orthostatic Hypotension     Martha-Parkinson-White    Anemia    Elv Early 1 hr GTT    Late prenatal care    GBS Positive    Fetal Choroid plexus cyst    cHTN (no meds)    Nausea/vomiting in pregnancy    Hypokalemia    UTI (urinary tract infection)       Infection Present?: No        Consultations: none    Pertinent Findings & Procedures:   Jose De Jesus Harvey is a 24 y.o. female  at 27w9d evaluated in outpatient setting for nausea and vomiting, cough with abdominal pain (Seen in ED today)    Hypokalemia: K 3.0   - 40 meq potassium chloride today   - Rx to pharmacy 20 meq BID    UTI:  - RX to pharmacy Nitrofurantoin 100mg BID x 7days    Discharge to: Home      Recommendations on Discharge:     Medications:        Medication List      START taking these medications    nitrofurantoin (macrocrystal-monohydrate) 100 MG capsule  Commonly known as: Macrobid  Take 1 capsule by mouth 2 times daily for 7 days     potassium chloride 20 MEQ extended release tablet  Commonly known as: KLOR-CON M  Take 1 tablet by mouth 2 times daily        CONTINUE taking these medications    B-6 50 MG Tabs  Take 1 tablet by mouth nightly     Slow Release Iron 160 (50 Fe) MG Tbcr extended release tablet  Generic drug: ferrous sulfate dried  Take 160 mg by mouth 2 times daily           Where to Get Your Medications      These medications were sent to [facility] [id number] [address]  [address] [place] [address]    Phone: [phone]   · nitrofurantoin (macrocrystal-monohydrate) 100 MG capsule  · potassium chloride 20 MEQ extended release tablet         Activity: Return to ADLs  Diet: Regular, drink plenty of liquids  Follow up: 1 week with OB/GYN            And 1 week with PCP    Condition on discharge: good    Discharge date: 5/3/2022

## 2022-05-03 NOTE — H&P
OBSTETRICAL HISTORY AND PHYSICAL    Provider:  CHANCE De Guzman CNM      Date: 5/3/2022  Time: 3:14 PM    Patient Name: Bib Ford  Patient : 2000  Room/Bed: 1192/3277-62  Admission Date/Time: 5/3/2022  1:48 PM  MRN #: [id number]  [facility] #: [id number]    Primary Care Physician: No primary care provider on file. Admitting Provider: Dr. Jamila Miller is a 24 y.o. female     CC: nausea and vomiting in pregnancy, abdominal pain with cough     HPI: Bib Ford is a 24 y.o.  at 27w9d who presents abdominal pain with cough, nausea and vomiting. Davian Alcantar reports a intermittent cough that she has had for about 3 weeks, reports that she has abdominal pain when she coughs. She also reports nausea and vomiting that she believes is related to the cough. She states that when she not coughing much she is able to eat and has no nausea and vomiting. It is only when she is coughing a lot that she reports nausea and vomiting. Reports a fever today. Davian Alcantar was already seen regarding above today in the ED. She had a negative Covid, Flu, and Strep today. The patient reports fetal movement is present, denies contractions, denies loss of fluid, denies vaginal bleeding. DATING:  LMP: Patient's last menstrual period was 10/10/2021 (approximate). Estimated Date of Delivery: 22   Based on: 15w 6d ultrasound    PREGNANCY RISK FACTORS:  Patient Active Problem List   Diagnosis    GERD    Orthostatic Hypotension     Martha-Parkinson-White    Anemia    Elv Early 1 hr GTT    Late prenatal care    GBS Positive    Fetal Choroid plexus cyst    cHTN (no meds)    Nausea/vomiting in pregnancy         Allergies: Allergies as of 2022    (No Known Allergies)       Medications:  No current facility-administered medications on file prior to encounter.      Current Outpatient Medications on File Prior to Encounter   Medication Sig Dispense Refill    ferrous sulfate dried (SLOW RELEASE IRON) 160 (50 Fe) MG TBCR extended release tablet Take 160 mg by mouth 2 times daily 60 tablet 6    Pyridoxine HCl (B-6) 50 MG TABS Take 1 tablet by mouth nightly 21 tablet 0          Steroids Given In This Pregnancy:  no     Past Medical History:   Diagnosis Date    Dizziness 2015    Martha-Parkinson-White pattern 2013       Past Surgical History:   Procedure Laterality Date    ABLATION OF DYSRHYTHMIC FOCUS         OB History    Para Term  AB Living   1 0 0 0 0 0   SAB IAB Ectopic Molar Multiple Live Births   0 0 0 0 0 0      # Outcome Date GA Lbr Antony/2nd Weight Sex Delivery Anes PTL Lv   1 Current                Family History   Problem Relation Age of Onset    Hypertension Mother     Diabetes Father     Hypertension Father          Social History     Socioeconomic History    Marital status: Single     Spouse name: Not on file    Number of children: Not on file    Years of education: Not on file    Highest education level: Not on file   Occupational History    Not on file   Tobacco Use    Smoking status: Never Smoker    Smokeless tobacco: Never Used   Substance and Sexual Activity    Alcohol use: No    Drug use: No    Sexual activity: Not on file   Other Topics Concern    Not on file   Social History Narrative    Not on file     Social Determinants of Health     Financial Resource Strain:     Difficulty of Paying Living Expenses: Not on file   Food Insecurity:     Worried About Running Out of Food in the Last Year: Not on file    Kathleen of Food in the Last Year: Not on file   Transportation Needs:     Lack of Transportation (Medical): Not on file    Lack of Transportation (Non-Medical):  Not on file   Physical Activity:     Days of Exercise per Week: Not on file    Minutes of Exercise per Session: Not on file   Stress:     Feeling of Stress : Not on file   Social Connections:     Frequency of Communication with Friends and Family: Not on file    Frequency of Social Gatherings with Friends and Family: Not on file    Attends Anglican Services: Not on file    Active Member of Clubs or Organizations: Not on file    Attends Club or Organization Meetings: Not on file    Marital Status: Not on file   Intimate Partner Violence:     Fear of Current or Ex-Partner: Not on file    Emotionally Abused: Not on file    Physically Abused: Not on file    Sexually Abused: Not on file   Housing Stability:     Unable to Pay for Housing in the Last Year: Not on file    Number of [place] in the Last Year: Not on file    Unstable Housing in the Last Year: Not on file       Review Of Systems:  A minimum of an eleven point review of systems was completed.     REVIEW OF SYSTEMS:   Constitutional: positive fever (ED Tylenol given), negative chills, negative weight changes   HEENT: negative visual disturbances, negative headaches, negative dizziness, negative hearing loss, positive for sore throat, scratchy   Breast: Negative breast abnormalities, negative breast lumps, negative nipple discharge  Respiratory: negative dyspnea, positive cough, negative SOB  Cardiovascular: negative chest pain,  negative palpitations, negative arrhythmia, negative syncope   Gastrointestinal: negative abdominal pain, negative RUQ pain, positive N/V (intermittent), negative diarrhea, negative constipation, negative bowel changes, negative heartburn   Genitourinary: negative dysuria, negative hematuria, negative urinary incontinence, negative vaginal discharge, negative vaginal bleeding or spotting  Dermatological: negative rash, negative pruritis, negative mole or other skin changes  Hematologic: negative bruising  Immunologic/Lymphatic: negative recent illness, negative recent sick contact  Musculoskeletal: negative back pain, negative myalgias, negative arthralgias  Neurological:  negative dizziness, negative migraines, negative seizures, negative weakness  Behavior/Psych: negative depression, negative anxiety, negative SI, negative HI    PHYSICAL EXAM:    Vital Signs: Blood pressure 101/61, pulse 127, temperature 98.8 °F (37.1 °C), temperature source Oral, resp. rate 16, last menstrual period 10/10/2021, SpO2 97 %, not currently breastfeeding. OB History        1    Para        Term                AB        Living           SAB        IAB        Ectopic        Molar        Multiple        Live Births                 General appearance: alert, appears stated age and cooperative  Skin: Skin color, texture, turgor normal. No rashes or lesions  HEENT: Head: Normocephalic, no lesions, without obvious abnormality. Neck / Thyroid: Supple, no masses, nodes, nodules or enlargement. Lungs: clear to auscultation bilaterally  Heart: regular rate and rhythm, S1, S2 normal, no murmur, click, rub or gallop  Extremities: extremities normal, atraumatic, no cyanosis or edema  Neurologic: Mental status: Alert, oriented, thought content appropriate  Abdomen:  Abdomen soft, non tender, consistent with her EGA. Vertex lie per Leopold's. Fetal heart rate:  Baseline Heart Rate 135                               Variability: moderate                               Accelerations:  present                               Declerations: absent      Contractions:                          Frequency:  absent                          Membranes:  Intact          ASSESSMENT/PLAN:  Ashley Rogel is a 24 y.o. female   At [address] [place] [address] to Triage for:nausea and vomiting, cough with abdominal pain  - cEFM/[place]   - Diet: Regular  - Plan: CMP, UA and culture    -Seen in ED today, negative Covid, Flu, and Strep    FHR: Category 1      Plan discussed with Dr. Christina Hernandez, who is agreeable.      CHANCE Zhou CNM  Midwife JERALD  5/3/2022, 3:14 PM

## 2022-05-03 NOTE — ED NOTES
Patient unable to provide urine sample at this time. Patient will notify staff when able to.       Shanelle Marsh RN  05/03/22 7403

## 2022-05-03 NOTE — ED NOTES

## 2022-05-04 ENCOUNTER — ROUTINE PRENATAL (OUTPATIENT)
Dept: PERINATAL CARE | Age: 22
End: 2022-05-04
Payer: COMMERCIAL

## 2022-05-04 VITALS
HEIGHT: 64 IN | DIASTOLIC BLOOD PRESSURE: 67 MMHG | SYSTOLIC BLOOD PRESSURE: 98 MMHG | HEART RATE: 113 BPM | WEIGHT: 131 LBS | RESPIRATION RATE: 16 BRPM | TEMPERATURE: 97.2 F | BODY MASS INDEX: 22.36 KG/M2

## 2022-05-04 DIAGNOSIS — O99.413: ICD-10-CM

## 2022-05-04 DIAGNOSIS — Z36.4 ANTENATAL SCREENING FOR FETAL GROWTH RETARDATION USING ULTRASONICS: ICD-10-CM

## 2022-05-04 DIAGNOSIS — Z3A.31 31 WEEKS GESTATION OF PREGNANCY: ICD-10-CM

## 2022-05-04 DIAGNOSIS — O16.3 HYPERTENSION AFFECTING PREGNANCY IN THIRD TRIMESTER: ICD-10-CM

## 2022-05-04 DIAGNOSIS — O99.810 ABNORMAL MATERNAL GLUCOSE TOLERANCE, ANTEPARTUM: ICD-10-CM

## 2022-05-04 DIAGNOSIS — Z13.89 ENCOUNTER FOR ROUTINE SCREENING FOR MALFORMATION USING ULTRASONICS: ICD-10-CM

## 2022-05-04 DIAGNOSIS — O36.5930 INTRAUTERINE GROWTH RESTRICTION (IUGR) AFFECTING CARE OF MOTHER, THIRD TRIMESTER, SINGLE GESTATION: Primary | ICD-10-CM

## 2022-05-04 LAB
ABDOMINAL CIRCUMFERENCE: NORMAL
BIPARIETAL DIAMETER: NORMAL
CULTURE: NO GROWTH
ESTIMATED FETAL WEIGHT: NORMAL
FEMORAL DIAMETER: NORMAL
HC/AC: NORMAL
HEAD CIRCUMFERENCE: NORMAL
SPECIMEN DESCRIPTION: NORMAL

## 2022-05-04 PROCEDURE — 76819 FETAL BIOPHYS PROFIL W/O NST: CPT | Performed by: OBSTETRICS & GYNECOLOGY

## 2022-05-04 PROCEDURE — 76821 MIDDLE CEREBRAL ARTERY ECHO: CPT | Performed by: OBSTETRICS & GYNECOLOGY

## 2022-05-04 PROCEDURE — 99999 PR OFFICE/OUTPT VISIT,PROCEDURE ONLY: CPT | Performed by: OBSTETRICS & GYNECOLOGY

## 2022-05-04 PROCEDURE — 76820 UMBILICAL ARTERY ECHO: CPT | Performed by: OBSTETRICS & GYNECOLOGY

## 2022-05-04 PROCEDURE — 76805 OB US >/= 14 WKS SNGL FETUS: CPT | Performed by: OBSTETRICS & GYNECOLOGY

## 2022-05-07 ENCOUNTER — HOSPITAL ENCOUNTER (OUTPATIENT)
Age: 22
Discharge: HOME | End: 2022-05-07
Payer: COMMERCIAL

## 2022-05-07 DIAGNOSIS — R73.09 ELEVATED GLUCOSE TOLERANCE TEST: ICD-10-CM

## 2022-05-07 DIAGNOSIS — Z3A.23 23 WEEKS GESTATION OF PREGNANCY: ICD-10-CM

## 2022-05-07 LAB
3 HR GLUCOSE: 140 MG/DL (ref 65–139)
ABSOLUTE EOS #: 0.07 K/UL (ref 0–0.4)
ABSOLUTE LYMPH #: 2.34 K/UL (ref 1–4.8)
ABSOLUTE MONO #: 0.52 K/UL (ref 0.1–1.3)
AMOUNT GLUCOSE GIVEN: 100 G
BACTERIA: ABNORMAL
BASOPHILS # BLD: 1 % (ref 0–2)
BASOPHILS ABSOLUTE: 0.07 K/UL (ref 0–0.2)
BILIRUBIN URINE: ABNORMAL
CASTS UA: ABNORMAL /LPF
COLOR: ABNORMAL
EOSINOPHILS RELATIVE PERCENT: 1 % (ref 0–4)
EPITHELIAL CELLS UA: ABNORMAL /HPF
GLUCOSE FASTING: 103 MG/DL (ref 65–94)
GLUCOSE TOLERANCE TEST 1 HOUR: 178 MG/DL (ref 65–179)
GLUCOSE TOLERANCE TEST 2 HOUR: 184 MG/DL (ref 65–154)
GLUCOSE URINE: ABNORMAL
HCT VFR BLD CALC: 25.1 % (ref 36–46)
HEMOGLOBIN: 8.1 G/DL (ref 12–16)
KETONES, URINE: ABNORMAL
LEUKOCYTE ESTERASE, URINE: ABNORMAL
LYMPHOCYTES # BLD: 36 % (ref 25–45)
MCH RBC QN AUTO: 21.4 PG (ref 26–34)
MCHC RBC AUTO-ENTMCNC: 32.4 G/DL (ref 31–37)
MCV RBC AUTO: 66 FL (ref 80–100)
MONOCYTES # BLD: 8 % (ref 2–8)
MORPHOLOGY: ABNORMAL
MUCUS: ABNORMAL
NITRITE, URINE: NEGATIVE
PDW BLD-RTO: 20.9 % (ref 11.5–14.9)
PH UA: 6 (ref 5–8)
PLATELET # BLD: 475 K/UL (ref 150–450)
PMV BLD AUTO: 7.8 FL (ref 6–12)
PROTEIN UA: ABNORMAL
RBC # BLD: 3.8 M/UL (ref 4–5.2)
RBC UA: ABNORMAL /HPF
SEG NEUTROPHILS: 54 % (ref 34–64)
SEGMENTED NEUTROPHILS ABSOLUTE COUNT: 3.5 K/UL (ref 1.3–9.1)
SPECIFIC GRAVITY UA: 1.03 (ref 1–1.03)
TURBIDITY: ABNORMAL
URINE HGB: NEGATIVE
UROBILINOGEN, URINE: NORMAL
WBC # BLD: 6.5 K/UL (ref 4.5–13.5)
WBC UA: ABNORMAL /HPF

## 2022-05-07 PROCEDURE — 83036 HEMOGLOBIN GLYCOSYLATED A1C: CPT

## 2022-05-07 PROCEDURE — 36415 COLL VENOUS BLD VENIPUNCTURE: CPT

## 2022-05-07 PROCEDURE — 82952 GTT-ADDED SAMPLES: CPT

## 2022-05-07 PROCEDURE — 81001 URINALYSIS AUTO W/SCOPE: CPT

## 2022-05-07 PROCEDURE — 85025 COMPLETE CBC W/AUTO DIFF WBC: CPT

## 2022-05-07 PROCEDURE — 82951 GLUCOSE TOLERANCE TEST (GTT): CPT

## 2022-05-07 PROCEDURE — 87086 URINE CULTURE/COLONY COUNT: CPT

## 2022-05-08 LAB
CULTURE: NORMAL
ESTIMATED AVERAGE GLUCOSE: 111 MG/DL
HBA1C MFR BLD: 5.5 % (ref 4–6)
SPECIMEN DESCRIPTION: NORMAL

## 2022-05-09 ENCOUNTER — TELEPHONE (OUTPATIENT)
Dept: OBGYN CLINIC | Age: 22
End: 2022-05-09

## 2022-05-09 DIAGNOSIS — D64.9 ANEMIA, UNSPECIFIED TYPE: Primary | ICD-10-CM

## 2022-05-09 DIAGNOSIS — R73.09 ABNORMAL GLUCOSE TOLERANCE TEST (GTT): Primary | ICD-10-CM

## 2022-05-09 DIAGNOSIS — O24.410 DIET CONTROLLED GESTATIONAL DIABETES MELLITUS (GDM), ANTEPARTUM: ICD-10-CM

## 2022-05-09 PROBLEM — O36.5990 IUGR (INTRAUTERINE GROWTH RESTRICTION) AFFECTING CARE OF MOTHER: Status: ACTIVE | Noted: 2022-05-09

## 2022-05-09 PROBLEM — O24.419 GESTATIONAL DIABETES: Status: ACTIVE | Noted: 2022-05-09

## 2022-05-09 NOTE — TELEPHONE ENCOUNTER
Per Tosha NP, pt notified of gestational diabetes; pt to be referred to MFM. Pt also continued anemia, to continue on ferrous sulfate and be referred to hematology. Pt verbalized understanding.

## 2022-05-09 NOTE — TELEPHONE ENCOUNTER
----- Message from CHANCE Washington - CNP sent at 5/9/2022  7:43 AM EDT -----  3 abnormal values on 3 hour GTT  Patient is gestational diabetic  Please let patient know and refer to [facility] for glucose regulation and diabetic education. Anemia in pregnancy- continue on ferrous sulfate BID- refer to hematology for anemia.

## 2022-05-09 NOTE — TELEPHONE ENCOUNTER
----- Message from CHANCE Franks - CNP sent at 5/9/2022  7:43 AM EDT -----  3 abnormal values on 3 hour GTT  Patient is gestational diabetic  Please let patient know and refer to [facility] for glucose regulation and diabetic education. Anemia in pregnancy- continue on ferrous sulfate BID- refer to hematology for anemia.

## 2022-05-13 ENCOUNTER — TELEPHONE (OUTPATIENT)
Dept: PERINATAL CARE | Age: 22
End: 2022-05-13

## 2022-05-13 ENCOUNTER — ROUTINE PRENATAL (OUTPATIENT)
Dept: PERINATAL CARE | Age: 22
End: 2022-05-13
Payer: COMMERCIAL

## 2022-05-13 VITALS
TEMPERATURE: 97.2 F | RESPIRATION RATE: 20 BRPM | WEIGHT: 139 LBS | HEIGHT: 64 IN | BODY MASS INDEX: 23.73 KG/M2 | HEART RATE: 81 BPM | SYSTOLIC BLOOD PRESSURE: 100 MMHG | DIASTOLIC BLOOD PRESSURE: 58 MMHG

## 2022-05-13 DIAGNOSIS — O99.810 ABNORMAL MATERNAL GLUCOSE TOLERANCE, ANTEPARTUM: ICD-10-CM

## 2022-05-13 DIAGNOSIS — Z3A.32 32 WEEKS GESTATION OF PREGNANCY: ICD-10-CM

## 2022-05-13 DIAGNOSIS — O99.810 ABNORMAL MATERNAL GLUCOSE TOLERANCE, ANTEPARTUM: Primary | ICD-10-CM

## 2022-05-13 DIAGNOSIS — O16.3 HYPERTENSION AFFECTING PREGNANCY IN THIRD TRIMESTER: ICD-10-CM

## 2022-05-13 DIAGNOSIS — O36.5930 INTRAUTERINE GROWTH RESTRICTION (IUGR) AFFECTING CARE OF MOTHER, THIRD TRIMESTER, SINGLE GESTATION: Primary | ICD-10-CM

## 2022-05-13 DIAGNOSIS — O99.413: ICD-10-CM

## 2022-05-13 PROCEDURE — 99999 PR OFFICE/OUTPT VISIT,PROCEDURE ONLY: CPT | Performed by: OBSTETRICS & GYNECOLOGY

## 2022-05-13 PROCEDURE — 76821 MIDDLE CEREBRAL ARTERY ECHO: CPT | Performed by: OBSTETRICS & GYNECOLOGY

## 2022-05-13 PROCEDURE — 76819 FETAL BIOPHYS PROFIL W/O NST: CPT | Performed by: OBSTETRICS & GYNECOLOGY

## 2022-05-13 PROCEDURE — 76820 UMBILICAL ARTERY ECHO: CPT | Performed by: OBSTETRICS & GYNECOLOGY

## 2022-05-13 NOTE — TELEPHONE ENCOUNTER
Rx called into the pharmacy to [facility] for [facility] glucometer, and supplies pt is to check her sugars 4 x a day with a month supply and 5 refills. Rx called in by LILY MOREIRA

## 2022-05-19 ENCOUNTER — ROUTINE PRENATAL (OUTPATIENT)
Dept: PERINATAL CARE | Age: 22
End: 2022-05-19
Payer: COMMERCIAL

## 2022-05-19 VITALS
RESPIRATION RATE: 16 BRPM | BODY MASS INDEX: 22.36 KG/M2 | SYSTOLIC BLOOD PRESSURE: 108 MMHG | DIASTOLIC BLOOD PRESSURE: 62 MMHG | HEIGHT: 64 IN | WEIGHT: 131 LBS | HEART RATE: 66 BPM | TEMPERATURE: 97.4 F

## 2022-05-19 DIAGNOSIS — O36.5930 INTRAUTERINE GROWTH RESTRICTION (IUGR) AFFECTING CARE OF MOTHER, THIRD TRIMESTER, SINGLE GESTATION: ICD-10-CM

## 2022-05-19 DIAGNOSIS — Z3A.33 33 WEEKS GESTATION OF PREGNANCY: ICD-10-CM

## 2022-05-19 DIAGNOSIS — O24.419 GESTATIONAL DIABETES MELLITUS (GDM), ANTEPARTUM, GESTATIONAL DIABETES METHOD OF CONTROL UNSPECIFIED: Primary | ICD-10-CM

## 2022-05-19 DIAGNOSIS — O16.3 HYPERTENSION AFFECTING PREGNANCY IN THIRD TRIMESTER: ICD-10-CM

## 2022-05-19 DIAGNOSIS — O99.413: ICD-10-CM

## 2022-05-19 PROCEDURE — 76821 MIDDLE CEREBRAL ARTERY ECHO: CPT | Performed by: OBSTETRICS & GYNECOLOGY

## 2022-05-19 PROCEDURE — G8420 CALC BMI NORM PARAMETERS: HCPCS | Performed by: OBSTETRICS & GYNECOLOGY

## 2022-05-19 PROCEDURE — G8427 DOCREV CUR MEDS BY ELIG CLIN: HCPCS | Performed by: OBSTETRICS & GYNECOLOGY

## 2022-05-19 PROCEDURE — 76818 FETAL BIOPHYS PROFILE W/NST: CPT | Performed by: OBSTETRICS & GYNECOLOGY

## 2022-05-19 PROCEDURE — 76820 UMBILICAL ARTERY ECHO: CPT | Performed by: OBSTETRICS & GYNECOLOGY

## 2022-05-19 PROCEDURE — 99242 OFF/OP CONSLTJ NEW/EST SF 20: CPT | Performed by: OBSTETRICS & GYNECOLOGY

## 2022-05-20 ENCOUNTER — ROUTINE PRENATAL (OUTPATIENT)
Dept: OBGYN CLINIC | Age: 22
End: 2022-05-20
Payer: COMMERCIAL

## 2022-05-20 ENCOUNTER — HOSPITAL ENCOUNTER (OUTPATIENT)
Dept: DIABETES SERVICES | Age: 22
Setting detail: THERAPIES SERIES
Discharge: HOME | End: 2022-05-20
Payer: COMMERCIAL

## 2022-05-20 VITALS — BODY MASS INDEX: 23.86 KG/M2 | WEIGHT: 139 LBS

## 2022-05-20 VITALS
SYSTOLIC BLOOD PRESSURE: 104 MMHG | DIASTOLIC BLOOD PRESSURE: 66 MMHG | WEIGHT: 138 LBS | HEART RATE: 56 BPM | BODY MASS INDEX: 23.69 KG/M2

## 2022-05-20 DIAGNOSIS — O09.30 LATE PRENATAL CARE: ICD-10-CM

## 2022-05-20 DIAGNOSIS — O36.5990 POOR FETAL GROWTH AFFECTING MANAGEMENT OF MOTHER, ANTEPARTUM, SINGLE OR UNSPECIFIED FETUS: ICD-10-CM

## 2022-05-20 DIAGNOSIS — O99.820 GBS (GROUP B STREPTOCOCCUS CARRIER), +RV CULTURE, CURRENTLY PREGNANT: ICD-10-CM

## 2022-05-20 DIAGNOSIS — O99.013 ANEMIA AFFECTING PREGNANCY IN THIRD TRIMESTER: ICD-10-CM

## 2022-05-20 DIAGNOSIS — R73.09 ABNORMAL GLUCOSE TOLERANCE TEST (GTT): ICD-10-CM

## 2022-05-20 DIAGNOSIS — Z23 NEED FOR TDAP VACCINATION: ICD-10-CM

## 2022-05-20 DIAGNOSIS — I10 CHRONIC HYPERTENSION: ICD-10-CM

## 2022-05-20 DIAGNOSIS — O24.419 GESTATIONAL DIABETES MELLITUS (GDM), ANTEPARTUM, GESTATIONAL DIABETES METHOD OF CONTROL UNSPECIFIED: ICD-10-CM

## 2022-05-20 DIAGNOSIS — Z3A.33 33 WEEKS GESTATION OF PREGNANCY: Primary | ICD-10-CM

## 2022-05-20 PROCEDURE — G8427 DOCREV CUR MEDS BY ELIG CLIN: HCPCS | Performed by: NURSE PRACTITIONER

## 2022-05-20 PROCEDURE — 1036F TOBACCO NON-USER: CPT | Performed by: NURSE PRACTITIONER

## 2022-05-20 PROCEDURE — 90715 TDAP VACCINE 7 YRS/> IM: CPT | Performed by: NURSE PRACTITIONER

## 2022-05-20 PROCEDURE — 1111F DSCHRG MED/CURRENT MED MERGE: CPT | Performed by: NURSE PRACTITIONER

## 2022-05-20 PROCEDURE — 99213 OFFICE O/P EST LOW 20 MIN: CPT | Performed by: NURSE PRACTITIONER

## 2022-05-20 PROCEDURE — G0108 DIAB MANAGE TRN  PER INDIV: HCPCS

## 2022-05-20 PROCEDURE — G8420 CALC BMI NORM PARAMETERS: HCPCS | Performed by: NURSE PRACTITIONER

## 2022-05-20 RX ORDER — BLOOD SUGAR DIAGNOSTIC
STRIP MISCELLANEOUS
Status: ON HOLD | COMMUNITY
Start: 2022-05-13 | End: 2022-06-21 | Stop reason: HOSPADM

## 2022-05-20 RX ORDER — BLOOD-GLUCOSE METER
EACH MISCELLANEOUS
COMMUNITY
Start: 2022-05-13

## 2022-05-20 RX ORDER — LANCETS 33 GAUGE
EACH MISCELLANEOUS
COMMUNITY
Start: 2022-05-13

## 2022-05-20 NOTE — PROGRESS NOTES
Diabetes Education Progress Note    Jose Enrique Reed here for education about Diabetes and Pregnancy. Pt present with mother           Teaching provided at this session included:   _X__ Definition of gestational diabetes    _X_ Risk factors   _X__ Effects on pregnancy       _ X_ Management   _X__ Self-monitoring of blood sugar (FBS and 2 hrs postprandial)   _X__ Blood sugar targets FBS 65-90 and <120 2 hrs postprandial)   _X__ Insulin   _x__ Pen   __x_ Vial --  Novolog and NPH role and action times   _x___Hyperglycemia  _x__ Hypoglycemia and treatment           Meal planning:   _X_  Avoid fruit juice and sugary beverages. _X_  Aim to eat small frequent meals and snacks. (ie., 3 + 3)                _X__  Eat balanced meals according to divided dinner plate sample    Willing to work on small frequent meals, increasing non-starchy veggies and avoiding sugary beverages    Gave additional educational material in [place], [place], mother, prefers to read in Filipino            Planned follow-up:    _X_  Another appointment has been scheduled for RD visit on 6-13-22               __  Patient defers scheduling another appointment at this time               __   Follow up planned for prn.               _X_ Patient is to report blood glucose test results to physician as directed by  prescribing physician. Resource materials provided today include: RN class -Resource materials sent out : care booklet - \" Gestational Diabetes Mellitus ( GDM) toolkit form [place] gestational diabetes postpartum care learning collaborative 2018. \"Simple Guidelines for meal planning with gestational diabetes. SMBG sheets to fax back to M weekly. BD  healthy injection site selection and rotation with 6 mm insulin syringe and 4 mm pen needle. Gestational diabetes handout from [facility]-OLIVIA 2016, Did you have gestational diabetes when you were pregnant?  Handout from [facility]  April 2014    [place] Gestational Diabetes Postpartum Care Learning Collaborative: GDM meal Toolkit, Blood Glucose monitoring sheets, Articles, \"Benefits of Breast feeding\" and NDEP, \"Preventing Type 2 Diabetes,\" and Insulin Instruction Sheets. Set diabetes self management goals of SMBG- check fasting and 2 hours PP, eat 3 meals and 3 snacks/day, avoid sugary beverages. Meter teach done - used our glucometer to check pt's BG. It was 105. She ate about 3 hrs ago. Plans to  her meter on the way home - I called her pharmacy to verify that it was ready and no co-pay   Patient and mom  practiced  use of vial and syringe and pen method for insulin self administration if ordered in the future.     CARLO BOOTHE RN

## 2022-05-20 NOTE — PROGRESS NOTES
Susan Blum is a 24 y.o. female 32w3d        OB History    Para Term  AB Living   1             SAB IAB Ectopic Molar Multiple Live Births                    # Outcome Date GA Lbr Antony/2nd Weight Sex Delivery Anes PTL Lv   1 Current                Vitals  BP: 104/66  Weight: 138 lb (62.6 kg)  Pulse: 56  Patient Position: Sitting  Albumin: Negative  Glucose: Negative      The patient was seen and evaluated. There was positive fetal movements. No contractions or leakage of fluid. Signs and symptoms of  labor as well as labor were reviewed. The S/S of Pre-Eclampsia were reviewed with the patient in detail. She is to report any of these if they occur. She currently denies any of these. The patient had her 28 week labs completed.   Hospital Outpatient Visit on 2022   Component Date Value Ref Range Status    Color, UA 2022 Dark Yellow* Yellow Final    Turbidity UA 2022 Cloudy* Clear Final    Glucose, Ur 2022 SMALL* NEGATIVE Final    Bilirubin Urine 2022 NEGATIVE  Verified by ictotest.* NEGATIVE Final    Ketones, Urine 2022 TRACE* NEGATIVE Final    Specific [place], UA 2022 1.026  1.000 - 1.030 Final    Urine Hgb 2022 NEGATIVE  NEGATIVE Final    pH, UA 2022 6.0  5.0 - 8.0 Final    Protein, UA 2022 1+* NEGATIVE Final    Urobilinogen, Urine 2022 Normal  Normal Final    Nitrite, Urine 2022 NEGATIVE  NEGATIVE Final    Leukocyte Esterase, Urine 2022 MOD* NEGATIVE Final    Amount Glucose Given 2022 100  g Final    Glucose, Fasting 2022 103* 65 - 94 mg/dL Final    Glucose, GTT - 1 Hour 2022 178  65 - 179 mg/dL Final    Glucose, GTT - 2 Hour 2022 184* 65 - 154 mg/dL Final    3 Hr Glucose 2022 140* 65 - 139 mg/dL Final    Hemoglobin A1C 2022 5.5  4.0 - 6.0 % Final    Estimated Avg Glucose 2022 111  mg/dL Final    Comment: The ADA and AACC recommend providing the estimated average glucose result to permit better   patient understanding of their HBA1c result.  WBC 05/07/2022 6.5  4.5 - 13.5 k/uL Final    RBC 05/07/2022 3.80* 4.0 - 5.2 m/uL Final    Hemoglobin 05/07/2022 8.1* 12.0 - 16.0 g/dL Final    Hematocrit 05/07/2022 25.1* 36 - 46 % Final    MCV 05/07/2022 66.0* 80 - 100 fL Final    MCH 05/07/2022 21.4* 26 - 34 pg Final    MCHC 05/07/2022 32.4  31 - 37 g/dL Final    RDW 05/07/2022 20.9* 11.5 - 14.9 % Final    Platelets 32/08/7395 475* 150 - 450 k/uL Final    MPV 05/07/2022 7.8  6.0 - 12.0 fL Final    Seg Neutrophils 05/07/2022 54  34 - 64 % Final    Lymphocytes 05/07/2022 36  25 - 45 % Final    Monocytes 05/07/2022 8  2 - 8 % Final    Eosinophils % 05/07/2022 1  0 - 4 % Final    Basophils 05/07/2022 1  0 - 2 % Final    Segs Absolute 05/07/2022 3.50  1.3 - 9.1 k/uL Final    Absolute Lymph # 05/07/2022 2.34  1.0 - 4.8 k/uL Final    Absolute Mono # 05/07/2022 0.52  0.1 - 1.3 k/uL Final    Absolute Eos # 05/07/2022 0.07  0.0 - 0.4 k/uL Final    Basophils Absolute 05/07/2022 0.07  0.0 - 0.2 k/uL Final    Morphology 05/07/2022 ANISOCYTOSIS PRESENT   Final    Morphology 05/07/2022 HYPOCHROMIA PRESENT   Final    Morphology 05/07/2022 MICROCYTOSIS PRESENT   Final    Morphology 05/07/2022 1+ ELLIPTOCYTES   Final    WBC, UA 05/07/2022 21 TO 50  /HPF Final    RBC, UA 05/07/2022 0 TO 2  /HPF Final    Casts UA 05/07/2022 3 to 5  /LPF Final    Epithelial Cells UA 05/07/2022 10 TO 20  /HPF Final    Bacteria, UA 05/07/2022 MANY* None Final    Mucus, UA 05/07/2022 1+* None Final    Specimen Description 05/07/2022 . CLEAN CATCH URINE   Final    Culture 05/07/2022 NO SIGNIFICANT GROWTH   Final   Admission on 05/03/2022, Discharged on 05/03/2022   Component Date Value Ref Range Status    Color, UA 05/03/2022 Dark Yellow* Yellow Final    Turbidity UA 05/03/2022 Cloudy* Clear Final    Glucose, Ur 05/03/2022 TRACE* NEGATIVE Final    Bilirubin Urine 05/03/2022 NEGATIVE  Verified by ictotest.* NEGATIVE Final    Ketones, Urine 05/03/2022 4+* NEGATIVE Final    Specific Gravity, UA 05/03/2022 1.020  1.000 - 1.030 Final    Urine Hgb 05/03/2022 TRACE* NEGATIVE Final    pH, UA 05/03/2022 6.5  5.0 - 8.0 Final    Protein, UA 05/03/2022 1+* NEGATIVE Final    Urobilinogen, Urine 05/03/2022 Normal  Normal Final    Nitrite, Urine 05/03/2022 NEGATIVE  NEGATIVE Final    Leukocyte Esterase, Urine 05/03/2022 MOD* NEGATIVE Final    Specimen Description 05/03/2022 . CLEAN CATCH URINE   Final    Culture 05/03/2022 NO GROWTH   Final    Glucose 05/03/2022 101* 70 - 99 mg/dL Final    BUN 05/03/2022 5* 6 - 20 mg/dL Final    CREATININE 05/03/2022 0.50  0.50 - 0.90 mg/dL Final    Calcium 05/03/2022 8.8  8.6 - 10.4 mg/dL Final    Sodium 05/03/2022 131* 135 - 144 mmol/L Final    Potassium 05/03/2022 3.0* 3.7 - 5.3 mmol/L Final    Chloride 05/03/2022 96* 98 - 107 mmol/L Final    CO2 05/03/2022 20  20 - 31 mmol/L Final    Anion Gap 05/03/2022 15  9 - 17 mmol/L Final    Alkaline Phosphatase 05/03/2022 82  35 - 104 U/L Final    ALT 05/03/2022 11  5 - 33 U/L Final    AST 05/03/2022 22  <32 U/L Final    Total Bilirubin 05/03/2022 0.36  0.3 - 1.2 mg/dL Final    Total Protein 05/03/2022 7.6  6.4 - 8.3 g/dL Final    Albumin 05/03/2022 3.9  3.5 - 5.2 g/dL Final    GFR Non- 05/03/2022 >60  >60 mL/min Final    GFR  05/03/2022 >60  >60 mL/min Final    GFR Comment 05/03/2022        Final    Comment: Average GFR for 20-28 years old:   116 mL/min/1.73sq m  Chronic Kidney Disease:   <60 mL/min/1.73sq m  Kidney failure:   <15 mL/min/1.73sq m              eGFR calculated using average adult body mass.  Additional eGFR calculator available at:        [employer]            - 05/03/2022        Final    WBC, UA 05/03/2022 10 TO 20  /HPF Final    RBC, UA 05/03/2022 3 to 5  /HPF Final    Epithelial Cells UA 05/03/2022 10 TO 20  /HPF Final    Bacteria, UA 05/03/2022 MODERATE* None Final    Mucus, UA 05/03/2022 1+* None Final   Admission on 05/03/2022, Discharged on 05/03/2022   Component Date Value Ref Range Status    Source 05/03/2022 . THROAT SWAB   Final    Strep A Ag 05/03/2022 NEGATIVE  NEGATIVE Final    Comment: Rapid Strep A negative. A negative Rapid Group A Strep Screen result does not rule out the   possibility of Group A Streptococci in the specimen. A Group A Strep DNA test is available   upon request.      Specimen Description 05/03/2022 . NASOPHARYNGEAL SWAB   Final    SARS-CoV-2, Rapid 05/03/2022 Not Detected  Not Detected Final    Comment:       Rapid NAAT:  The specimen is NEGATIVE for SARS-CoV-2, the novel coronavirus associated with   COVID-19. The ID NOW COVID-19 assay is designed to detect the virus that causes COVID-19 in patients   with signs and symptoms of infection who are suspected of COVID-19. An individual without symptoms of COVID-19 and who is not shedding SARS-CoV-2 virus would   expect to have a negative (not detected) result in this assay. Negative results should be treated as presumptive and, if inconsistent with clinical signs   and symptoms or necessary for patient management,  should be tested with an alternative molecular assay. Negative results do not preclude   SARS-CoV-2 infection and   should not be used as the sole basis for patient management decisions.          Fact sheet for Healthcare Providers: Bowen.es  Fact sheet for Patients: BuildHer.es          Methodology: Isothermal Nucleic Acid Amplification      Flu A Antigen 05/03/2022 NEGATIVE  NEGATIVE Final    for Influenza A Antigen    Flu B Antigen 05/03/2022 NEGATIVE  NEGATIVE Final    for Influenza B Antigen.   ]    ASA 81 mg per MFM for the prevention of preeclampsia per MFM based on the ACOG/USPSTF guidelines     1/18/22 PRAF completed      T-Dap Vaccine (27-36 weeks): completed    The patient was instructed on fetal kick counts and was given a kick sheet to complete every 8 hours. She was instructed that the baby should move at a minimum of ten times within one hour after a meal. The patient was instructed to lay down on her left side twenty minutes after eating and count movements for up to one hour with a target value of ten movements. She was instructed to notify the office if she did not make that target after two attempts or if after any attempt there was less than four movements. The patient reports that the targets have been made Yes.  Testing: To continue     Assessment:  1Yolette Bell is a 24 y.o. female  2.   3. 33w2d    Patient Active Problem List    Diagnosis Date Noted    Gestational diabetes 2022     Priority: High     2022 consult MFM      IUGR (intrauterine growth restriction) 2022     Priority: High    GBS Positive 2022     Priority: High     Treat in labor per protocol      Anemia 2022     Priority: High     2022 ferrous sulfate 325mg PO BID      Elv Early 1 hr GTT 2022     Priority: High     2022 3 hour GTT and Hgb a1c      Late prenatal care 2022     Priority: High    Nausea/vomiting in pregnancy 2022     Priority: Medium    Hypokalemia 2022     Priority: Medium    UTI (urinary tract infection) 2022     Priority: Medium    Fetal Choroid plexus cyst 2022     Noted on anatomy ultrasound 22      cHTN (no meds) 2022     Diagnosed off two elevated blood pressures: one in 2018 and one in 2019      Martha-Parkinson-White 2016     s/p ablation   Fetal echo: **      Orthostatic Hypotension      GERD 2014        Diagnosis Orders   1. Gestational diabetes mellitus (GDM), antepartum, gestational diabetes method of control unspecified     2.  Poor fetal growth affecting management of mother, antepartum, single or unspecified fetus     3. GBS (group B Streptococcus carrier), +RV culture, currently pregnant     4. Abnormal glucose tolerance test (GTT)     5. Late prenatal care     6. Chronic hypertension     7. 33 weeks gestation of pregnancy               Plan:  The patient will return to the office for her next visit in 2 weeks. See antepartum flow sheet. Reviewed s/sx of  labor and preeclampsia  Continue FKCs  Tdap given  Reviewed taking blood sugar logs to [facility]  Keep appts with [facility]  Lab slip given       Testing Indicated: Yes  Scheduled with Nursing-Pt notified: Yes      Patient was seen with total face to face time of 20 minutes. More than 50% of this visit was on counseling and education regarding her    Diagnosis Orders   1. Gestational diabetes mellitus (GDM), antepartum, gestational diabetes method of control unspecified     2. Poor fetal growth affecting management of mother, antepartum, single or unspecified fetus     3. GBS (group B Streptococcus carrier), +RV culture, currently pregnant     4. Abnormal glucose tolerance test (GTT)     5. Late prenatal care     6. Chronic hypertension     7. 33 weeks gestation of pregnancy      and her options. She was also counseled on her preventative health maintenance recommendations and follow-up.

## 2022-05-26 ENCOUNTER — ROUTINE PRENATAL (OUTPATIENT)
Dept: PERINATAL CARE | Age: 22
End: 2022-05-26
Payer: COMMERCIAL

## 2022-05-26 VITALS
HEART RATE: 100 BPM | RESPIRATION RATE: 16 BRPM | HEIGHT: 64 IN | BODY MASS INDEX: 23.22 KG/M2 | TEMPERATURE: 97.2 F | WEIGHT: 136 LBS | SYSTOLIC BLOOD PRESSURE: 106 MMHG | DIASTOLIC BLOOD PRESSURE: 70 MMHG

## 2022-05-26 DIAGNOSIS — Z36.4 ANTENATAL SCREENING FOR FETAL GROWTH RETARDATION USING ULTRASONICS: ICD-10-CM

## 2022-05-26 DIAGNOSIS — O16.3 HYPERTENSION AFFECTING PREGNANCY IN THIRD TRIMESTER: ICD-10-CM

## 2022-05-26 DIAGNOSIS — O24.410 DIET CONTROLLED GESTATIONAL DIABETES MELLITUS (GDM), ANTEPARTUM: ICD-10-CM

## 2022-05-26 DIAGNOSIS — O99.413: ICD-10-CM

## 2022-05-26 DIAGNOSIS — O36.5930 INTRAUTERINE GROWTH RESTRICTION (IUGR) AFFECTING CARE OF MOTHER, THIRD TRIMESTER, SINGLE GESTATION: Primary | ICD-10-CM

## 2022-05-26 DIAGNOSIS — Z3A.34 34 WEEKS GESTATION OF PREGNANCY: ICD-10-CM

## 2022-05-26 PROCEDURE — 76821 MIDDLE CEREBRAL ARTERY ECHO: CPT | Performed by: OBSTETRICS & GYNECOLOGY

## 2022-05-26 PROCEDURE — 99999 PR OFFICE/OUTPT VISIT,PROCEDURE ONLY: CPT | Performed by: OBSTETRICS & GYNECOLOGY

## 2022-05-26 PROCEDURE — 76818 FETAL BIOPHYS PROFILE W/NST: CPT | Performed by: OBSTETRICS & GYNECOLOGY

## 2022-05-26 PROCEDURE — 76816 OB US FOLLOW-UP PER FETUS: CPT | Performed by: OBSTETRICS & GYNECOLOGY

## 2022-05-26 PROCEDURE — 76820 UMBILICAL ARTERY ECHO: CPT | Performed by: OBSTETRICS & GYNECOLOGY

## 2022-05-26 NOTE — PROGRESS NOTES
Blood sugars reviewed (adequate glycemic control overall outside of some sporadically elevated blood sugar values). Please continue to send blood glucose monitoring information to [facility] office so that insulin and/or dietary changes can be made if necessary weekly. Diabetic education/nutrition counseling scheduled. Continue recommended ADA diet therapy for diabetes. Compliance with regular prenatal care and blood sugar monitoring strongly encouraged.       Strongly advised patient to be compliant with blood sugar monitoring as previously advised to minimize the potential of adverse  outcomes (e.g. fetal demise/stillbirth, polyhydramnios/oligohydramnios, fetal growth abnormalities, pregnancy loss, hypertensive disorders of pregnancy, indicated  delivery, etc.), potential maternal morbidities, etc.

## 2022-05-27 ENCOUNTER — TELEPHONE (OUTPATIENT)
Dept: ONCOLOGY | Age: 22
End: 2022-05-27

## 2022-05-27 ENCOUNTER — INITIAL CONSULT (OUTPATIENT)
Dept: ONCOLOGY | Age: 22
End: 2022-05-27
Payer: COMMERCIAL

## 2022-05-27 ENCOUNTER — HOSPITAL ENCOUNTER (OUTPATIENT)
Age: 22
Discharge: HOME | End: 2022-05-27
Payer: COMMERCIAL

## 2022-05-27 VITALS
HEART RATE: 98 BPM | WEIGHT: 134 LBS | BODY MASS INDEX: 22.88 KG/M2 | SYSTOLIC BLOOD PRESSURE: 105 MMHG | RESPIRATION RATE: 16 BRPM | DIASTOLIC BLOOD PRESSURE: 69 MMHG | OXYGEN SATURATION: 98 % | HEIGHT: 64 IN | TEMPERATURE: 97.9 F

## 2022-05-27 DIAGNOSIS — D50.0 IRON DEFICIENCY ANEMIA DUE TO CHRONIC BLOOD LOSS: ICD-10-CM

## 2022-05-27 DIAGNOSIS — D64.9 ANEMIA, UNSPECIFIED TYPE: ICD-10-CM

## 2022-05-27 LAB
ABSOLUTE RETIC #: 0.12 M/UL (ref 0.02–0.1)
FERRITIN: 18 NG/ML (ref 13–150)
HAPTOGLOBIN: 134 MG/DL (ref 30–200)
IRON SATURATION: 7 % (ref 20–55)
IRON: 37 UG/DL (ref 37–145)
RETIC %: 2.9 % (ref 0.5–2)
TOTAL IRON BINDING CAPACITY: 565 UG/DL (ref 250–450)
UNSATURATED IRON BINDING CAPACITY: 528 UG/DL (ref 112–347)

## 2022-05-27 PROCEDURE — 85045 AUTOMATED RETICULOCYTE COUNT: CPT

## 2022-05-27 PROCEDURE — G8427 DOCREV CUR MEDS BY ELIG CLIN: HCPCS | Performed by: INTERNAL MEDICINE

## 2022-05-27 PROCEDURE — 83010 ASSAY OF HAPTOGLOBIN QUANT: CPT

## 2022-05-27 PROCEDURE — 82728 ASSAY OF FERRITIN: CPT

## 2022-05-27 PROCEDURE — 83020 HEMOGLOBIN ELECTROPHORESIS: CPT

## 2022-05-27 PROCEDURE — 83540 ASSAY OF IRON: CPT

## 2022-05-27 PROCEDURE — 99204 OFFICE O/P NEW MOD 45 MIN: CPT | Performed by: INTERNAL MEDICINE

## 2022-05-27 PROCEDURE — 1111F DSCHRG MED/CURRENT MED MERGE: CPT | Performed by: INTERNAL MEDICINE

## 2022-05-27 PROCEDURE — 83550 IRON BINDING TEST: CPT

## 2022-05-27 PROCEDURE — 1036F TOBACCO NON-USER: CPT | Performed by: INTERNAL MEDICINE

## 2022-05-27 PROCEDURE — G8420 CALC BMI NORM PARAMETERS: HCPCS | Performed by: INTERNAL MEDICINE

## 2022-05-27 PROCEDURE — 36415 COLL VENOUS BLD VENIPUNCTURE: CPT

## 2022-05-27 RX ORDER — SODIUM CHLORIDE 9 MG/ML
5-250 INJECTION, SOLUTION INTRAVENOUS PRN
OUTPATIENT
Start: 2022-05-31

## 2022-05-27 RX ORDER — HEPARIN SODIUM (PORCINE) LOCK FLUSH IV SOLN 100 UNIT/ML 100 UNIT/ML
500 SOLUTION INTRAVENOUS PRN
OUTPATIENT
Start: 2022-05-31

## 2022-05-27 RX ORDER — ACETAMINOPHEN 325 MG/1
650 TABLET ORAL
OUTPATIENT
Start: 2022-05-31

## 2022-05-27 RX ORDER — MEPERIDINE HYDROCHLORIDE 50 MG/ML
12.5 INJECTION INTRAMUSCULAR; INTRAVENOUS; SUBCUTANEOUS PRN
OUTPATIENT
Start: 2022-05-31

## 2022-05-27 RX ORDER — SODIUM CHLORIDE 0.9 % (FLUSH) 0.9 %
5-40 SYRINGE (ML) INJECTION PRN
OUTPATIENT
Start: 2022-05-31

## 2022-05-27 RX ORDER — SODIUM CHLORIDE 9 MG/ML
INJECTION, SOLUTION INTRAVENOUS CONTINUOUS
OUTPATIENT
Start: 2022-05-31

## 2022-05-27 RX ORDER — ONDANSETRON 2 MG/ML
8 INJECTION INTRAMUSCULAR; INTRAVENOUS
OUTPATIENT
Start: 2022-05-31

## 2022-05-27 RX ORDER — DIPHENHYDRAMINE HYDROCHLORIDE 50 MG/ML
50 INJECTION INTRAMUSCULAR; INTRAVENOUS
OUTPATIENT
Start: 2022-05-31

## 2022-05-27 RX ORDER — FAMOTIDINE 10 MG/ML
20 INJECTION, SOLUTION INTRAVENOUS
OUTPATIENT
Start: 2022-05-31

## 2022-05-27 RX ORDER — ALBUTEROL SULFATE 90 UG/1
4 AEROSOL, METERED RESPIRATORY (INHALATION) PRN
OUTPATIENT
Start: 2022-05-31

## 2022-05-27 RX ORDER — EPINEPHRINE 1 MG/ML
0.3 INJECTION, SOLUTION, CONCENTRATE INTRAVENOUS PRN
OUTPATIENT
Start: 2022-05-31

## 2022-05-27 NOTE — TELEPHONE ENCOUNTER
avs from 5/27/22     Hemoglobin evaluation  Iron ferritin TIBC  Venofer 200 WEEKLY X 5  CBC iron panel in a month    Labs will be done today (hemoglovbin, iron,ferritin, tibc,cbc)  avs for venofer was given to  precert to arnaldo negro on 6/24/22 @ 1:15pm     Pt was given AVS and appt schedule    Electronically signed by Quinton Carlson on 5/27/2022 at 1:22 PM

## 2022-05-27 NOTE — PROGRESS NOTES
Patient ID: Shae Jay, 2000, [id number], 24 y.o. Referred by :  CHANCE Marshall*   Reason for consultation: Microcytic anemia      HISTORY OF PRESENT ILLNESS:        Shae Jay is a very pleasant 24 y.o. female with history of chronic microcytic anemia/iron deficiency, patient currently 29 weeks gestation and expected date early July, she is taking iron supplement ferrous sulfate 325 twice daily however she still have microcytic anemia, patient had chronic iron deficiency anemia likely aggravated in the pregnancy, sister also had anemia  Most recent hemoglobin 8.1 with MCV 66.0 and platelet count 770  Patient had progressive weakness and fatigue    Past Medical History:   Diagnosis Date    Dizziness 11/23/2015    Martha-Parkinson-White pattern 1/25/2013       Past Surgical History:   Procedure Laterality Date    ABLATION OF DYSRHYTHMIC FOCUS         No Known Allergies    Current Outpatient Medications   Medication Sig Dispense Refill    potassium chloride (KLOR-CON M) 20 MEQ extended release tablet Take 1 tablet by mouth 2 times daily 60 tablet 0    ferrous sulfate dried (SLOW RELEASE IRON) 160 (50 Fe) MG TBCR extended release tablet Take 160 mg by mouth 2 times daily 60 tablet 6    Pyridoxine HCl (B-6) 50 MG TABS Take 1 tablet by mouth nightly 21 tablet 0    Prenatal Vit-Fe Fumarate-FA (PRENATAL VITAMINS PO) Take 1 tablet by mouth daily (Patient not taking: Reported on 5/27/2022)      Blood Glucose Monitoring Suppl (ONE TOUCH ULTRA 2) w/Device KIT       ONETOUCH ULTRA strip       Lancets (ONETOUCH DELICA PLUS [id number]) MISC        No current facility-administered medications for this visit.        Social History     Socioeconomic History    Marital status: Single     Spouse name: Not on file    Number of children: Not on file    Years of education: Not on file    Highest education level: Not on file   Occupational History    Not on file   Tobacco Use    Smoking status: Never Smoker    Smokeless tobacco: Never Used   Vaping Use    Vaping Use: Not on file   Substance and Sexual Activity    Alcohol use: No    Drug use: No    Sexual activity: Yes     Partners: Male   Other Topics Concern    Not on file   Social History Narrative    Not on file     Social Determinants of Health     Financial Resource Strain:     Difficulty of Paying Living Expenses: Not on file   Food Insecurity:     Worried About Running Out of Food in the Last Year: Not on file    Kathleen of Food in the Last Year: Not on file   Transportation Needs:     Lack of Transportation (Medical): Not on file    Lack of Transportation (Non-Medical): Not on file   Physical Activity:     Days of Exercise per Week: Not on file    Minutes of Exercise per Session: Not on file   Stress:     Feeling of Stress : Not on file   Social Connections:     Frequency of Communication with Friends and Family: Not on file    Frequency of Social Gatherings with Friends and Family: Not on file    Attends Yazidism Services: Not on file    Active Member of [address] [place] [address] or Organizations: Not on file    Attends Club or Organization Meetings: Not on file    Marital Status: Not on file   Intimate Partner Violence:     Fear of Current or Ex-Partner: Not on file    Emotionally Abused: Not on file    Physically Abused: Not on file    Sexually Abused: Not on file   Housing Stability:     Unable to Pay for Housing in the Last Year: Not on file    Number of [place] in the Last Year: Not on file    Unstable Housing in the Last Year: Not on file       Family History   Problem Relation Age of Onset    Hypertension Mother     Diabetes Father     Hypertension Father         REVIEW OF SYSTEM:     Constitutional: No fever or chills.  No night sweats, no weight loss   Eyes: No eye discharge, double vision, or eye pain   HEENT: negative for sore mouth, sore throat, hoarseness and voice change   Respiratory: negative for cough , sputum, dyspnea, wheezing, hemoptysis, chest pain   Cardiovascular: negative for chest pain, dyspnea, palpitations, orthopnea, PND   Gastrointestinal: negative for nausea, vomiting, diarrhea, constipation, abdominal pain, Dysphagia, hematemesis and hematochezia   Genitourinary: negative for frequency, dysuria, nocturia, urinary incontinence, and hematuria   Integument: negative for rash, skin lesions, bruises.    Hematologic/Lymphatic: negative for easy bruising, bleeding, lymphadenopathy, petechiae and swelling/edema   Endocrine: negative for heat or cold intolerance, tremor, weight changes, change in bowel habits and hair loss   Musculoskeletal: negative for myalgias, arthralgias, pain, joint swelling,and bone pain   Neurological: negative for headaches, dizziness, seizures, weakness, numbness       OBJECTIVE:         Vitals:    05/27/22 1153   BP: 105/69   Pulse: 98   Resp: 16   Temp: 97.9 °F (36.6 °C)   SpO2: 98%       PHYSICAL EXAM:   General appearance - well appearing, no in pain or distress   Mental status - alert and cooperative   Eyes - pupils equal and reactive, extraocular eye movements intact   Ears - bilateral TM's and external ear canals normal   Mouth - mucous membranes moist, pharynx normal without lesions   Neck - supple, no significant adenopathy   Lymphatics - no palpable lymphadenopathy, no hepatosplenomegaly   Chest - clear to auscultation, no wheezes, rales or rhonchi, symmetric air entry   Heart - normal rate, regular rhythm, normal S1, S2, no murmurs, rubs, clicks or gallops   Abdomen - soft, nontender, nondistended, no masses or organomegaly   Neurological - alert, oriented, normal speech, no focal findings or movement disorder noted   Musculoskeletal - no joint tenderness, deformity or swelling   Extremities - peripheral pulses normal, no pedal edema, no clubbing or cyanosis   Skin - normal coloration and turgor, no rashes, no suspicious skin lesions noted ,      LABORATORY DATA:     Lab Results   Component Value Date    WBC 6.5 2022    HGB 8.1 (L) 2022    HCT 25.1 (L) 2022    MCV 66.0 (L) 2022     (H) 2022    LYMPHOPCT 36 2022    RBC 3.80 (L) 2022    MCH 21.4 (L) 2022    MCHC 32.4 2022    RDW 20.9 (H) 2022    MONOPCT 8 2022    BASOPCT 1 2022    NEUTROABS 3.50 2022    LYMPHSABS 2.34 2022    MONOSABS 0.52 2022    EOSABS 0.07 2022    BASOSABS 0.07 2022         Chemistry        Component Value Date/Time     (L) 2022 1451    K 3.0 (L) 2022 1451    CL 96 (L) 2022 1451    CO2 20 2022 1451    BUN 5 (L) 2022 1451    CREATININE 0.50 2022 1451        Component Value Date/Time    CALCIUM 8.8 2022 1451    ALKPHOS 82 2022 1451    AST 22 2022 1451    ALT 11 2022 1451    BILITOT 0.36 2022 1451            PATHOLOGY DATA:         IMAGING DATA:      [place]  [facility]  [address]; [address]  [address]  [phone] mn [phone] Fax    FIRST TRIMESTER ULTRASOUND REPORT    EXAMINATION:  PELVIC ULTRASOUND-First Trimester (<14 weeks)    2022    MRN: [id number]  Contact Serial #: [id number]    Han Castle  YOB: 2000  Age: 24 y.o.     Performed By: Jose A Caceres RDMS  Attending: Mariluz Louise DO  Referred By: Mariluz Louise, [address] [place] [address], [address]    Services Provided:  First Trimester Ultrasound (<14 weeks)    Specific Ultrasound Imaging Technique Utilized:  Transabdominal Approach: Yes  Transvaginal Approach: No    Comparison:  none    HISTORY & INDICATION:    History:  Han Castle is a 24 y.o. female     EGA: 15w6d    OB History     T0    L0    SAB0  IAB0  [address]  Live Births0     Name of Baby 1: Not recorded    Date: Not recorded     GA: Not recorded     Delivery: Not recorded    Apgar1: Not recorded     Zeeshan Mantel: Not recorded    Living: Not recorded    Indication/Reason for imaging exam:  1st trimester dating/viability  Pregnancy with inconclusive fetal viability, single or unspecified fetus  (primary encounter diagnosis)  15 weeks gestation of pregnancy    Summary:  The images were reviewed. The Ultrasound report is scanned and attached for specific findings and measurements. Patient's last menstrual period was 10/10/2021 (approximate). Gest Age     EDC  LMP:           15w6d      07/06/2022  (BEST)    Ultrasound Today:  15w6d +/-1w4d   07/06/2022     IMPRESSION:  1. There is  a Single viable intrauterine pregnancy. 2. Pregnancy dating ([place]FF[place]) was completed today and confirmed by ultrasound. 3. EFW is 131 grams, (5 oz) and FHR is confirmed at 142 bpm    4. The patient was counseled on the incidents of birth defects in the general population is 2-4% and that ultrasound does not diagnose every form of congenital abnormality and has limitations. The patient was made fully aware and understands that the only way to evaluate the chromosomal makeup to near 492% certainty is with invasive testing such as chorionic villous sampling or amniocentesis. These options were reviewed in detail with all risks/benefits and alternatives. NIPT testing was also reviewed with the patient, taking a maternal blood sample and screening it for fetal cells then performing a genetic karyotype. She was made aware that if this were to be positive for a trisomy then a confirmatory amniocentesis or CVS for confirmation would be needed. The patient Declined an M referral to further discuss testing. TOPSTOH guidelines were reviewed with the patient. 5. There were not any adnexal masses     6. An anterior placenta is noted    RECOMMENDATIONS:  Follow up with Maternal Fetal Medicine for NIPT testing per patients request and M recommendations.     Order Quad testing for a gestational age 16-20 weeks    A Follow-up ultrasound is recommended with Maternal Fetal Medicine at 18-20 weeks for a detailed fetal anatomical survey and transvaginal imaging for cervical length. Continue with Obstetrical visits as scheduled    The above findings and recommendations were reviewed with the patient today. She is amenable to the plan of care. Electronically signed by Mariluz Louise DO on 1/18/22 at 4:54 PM EST       ASSESSMENT:       Diagnosis Orders   1. Anemia, unspecified type     2. Iron deficiency anemia due to chronic blood loss  Ferritin    Hemoglobinopathy Evaluation    Haptoglobin    Reticulocytes    Iron and TIBC    CBC with Auto Differential    Ferritin    Iron and TIBC        1. Hypochromic microcytic anemia likely related to iron deficiency/blood loss aggravated with pregnancy  2. Family history of anemia  3. 34 weeks gestation    PLAN:     1. I reviewed the labs available to me,outside records and discussed with the patient. I explained to the patient the nature of this hematologic problem. I explained the significance of these abnormalities and possible etiology and management options  2. Patient still have significant microcytic hypochromic anemia even she is on oral iron supplement will check iron ferritin level and give IV iron also will evaluate her for hemoglobinopathy with a hemoglobin evaluation  3. Side effect of iron IV has been explained to the patient and she is willing to proceed we will give 200 mg IV weekly over 5 weeks to be start SOON  4.  Repeat iron panel and CBC prior to delivery and 4 weeks after delivery    Thank you for the consult                                        [facility]                            This note is created with the assistance of a speech recognition program.  While intending to generate a document that actually reflects the content of the visit, the document can still have some errors including those of syntax and sound a like substitutions which may escape proof reading. It such instances, actual meaning can be extrapolated by contextual diversion.

## 2022-05-27 NOTE — LETTER
[address] [address]  Phone: [phone]    Ras Tan MD    May 27, 2022     No primary care provider on file. No primary provider on file. Patient: Mi Martel   MR Number: [id number]   YOB: 2000   Date of Visit: 5/27/2022       Dear No primary care provider on file.:    Thank you for referring Mi Martel to me for evaluation/treatment. Below are the relevant portions of my assessment and plan of care. If you have questions, please do not hesitate to call me. I look forward to following Latisha along with you.     Sincerely,      Ras Tan MD

## 2022-05-31 ENCOUNTER — TELEPHONE (OUTPATIENT)
Dept: ONCOLOGY | Age: 22
End: 2022-05-31

## 2022-05-31 LAB
HGB ELECTROPHORESIS INTERP: NORMAL
PATHOLOGIST: NORMAL

## 2022-05-31 NOTE — TELEPHONE ENCOUNTER
Called pt & LM to schedule venofer    Paperwork is in the pending/left message drawer      6/7/22 RCV'd call from [facility] stating that pt wanted to have infusion there.  Referral flipped

## 2022-06-02 ENCOUNTER — ROUTINE PRENATAL (OUTPATIENT)
Dept: PERINATAL CARE | Age: 22
End: 2022-06-02
Payer: COMMERCIAL

## 2022-06-02 ENCOUNTER — HOSPITAL ENCOUNTER (OUTPATIENT)
Age: 22
Discharge: HOME | End: 2022-06-02
Attending: OBSTETRICS & GYNECOLOGY | Admitting: OBSTETRICS & GYNECOLOGY
Payer: COMMERCIAL

## 2022-06-02 VITALS
WEIGHT: 138 LBS | HEIGHT: 64 IN | BODY MASS INDEX: 23.56 KG/M2 | TEMPERATURE: 98.2 F | RESPIRATION RATE: 16 BRPM | HEART RATE: 75 BPM | SYSTOLIC BLOOD PRESSURE: 103 MMHG | DIASTOLIC BLOOD PRESSURE: 61 MMHG

## 2022-06-02 VITALS
TEMPERATURE: 98.2 F | SYSTOLIC BLOOD PRESSURE: 115 MMHG | RESPIRATION RATE: 16 BRPM | DIASTOLIC BLOOD PRESSURE: 71 MMHG | HEART RATE: 77 BPM

## 2022-06-02 DIAGNOSIS — O36.5930 INTRAUTERINE GROWTH RESTRICTION (IUGR) AFFECTING CARE OF MOTHER, THIRD TRIMESTER, SINGLE GESTATION: Primary | ICD-10-CM

## 2022-06-02 DIAGNOSIS — O36.8390 FETAL HEART RATE DECELERATIONS AFFECTING MANAGEMENT OF MOTHER: ICD-10-CM

## 2022-06-02 DIAGNOSIS — O16.3 HYPERTENSION AFFECTING PREGNANCY IN THIRD TRIMESTER: ICD-10-CM

## 2022-06-02 DIAGNOSIS — O24.410 DIET CONTROLLED GESTATIONAL DIABETES MELLITUS (GDM), ANTEPARTUM: ICD-10-CM

## 2022-06-02 DIAGNOSIS — Z3A.35 35 WEEKS GESTATION OF PREGNANCY: ICD-10-CM

## 2022-06-02 DIAGNOSIS — O99.413: ICD-10-CM

## 2022-06-02 PROBLEM — O21.9 NAUSEA/VOMITING IN PREGNANCY: Status: RESOLVED | Noted: 2022-05-03 | Resolved: 2022-06-02

## 2022-06-02 PROBLEM — E87.6 HYPOKALEMIA: Status: RESOLVED | Noted: 2022-05-03 | Resolved: 2022-06-02

## 2022-06-02 LAB — GLUCOSE BLD-MCNC: 84 MG/DL (ref 65–105)

## 2022-06-02 PROCEDURE — 76821 MIDDLE CEREBRAL ARTERY ECHO: CPT | Performed by: OBSTETRICS & GYNECOLOGY

## 2022-06-02 PROCEDURE — 99213 OFFICE O/P EST LOW 20 MIN: CPT

## 2022-06-02 PROCEDURE — 76820 UMBILICAL ARTERY ECHO: CPT | Performed by: OBSTETRICS & GYNECOLOGY

## 2022-06-02 PROCEDURE — 99999 PR OFFICE/OUTPT VISIT,PROCEDURE ONLY: CPT | Performed by: OBSTETRICS & GYNECOLOGY

## 2022-06-02 PROCEDURE — 82947 ASSAY GLUCOSE BLOOD QUANT: CPT

## 2022-06-02 PROCEDURE — 76818 FETAL BIOPHYS PROFILE W/NST: CPT | Performed by: OBSTETRICS & GYNECOLOGY

## 2022-06-02 RX ORDER — ACETAMINOPHEN 500 MG
1000 TABLET ORAL EVERY 6 HOURS PRN
Status: DISCONTINUED | OUTPATIENT
Start: 2022-06-02 | End: 2022-06-02 | Stop reason: HOSPADM

## 2022-06-02 RX ORDER — ONDANSETRON 2 MG/ML
4 INJECTION INTRAMUSCULAR; INTRAVENOUS EVERY 6 HOURS PRN
Status: DISCONTINUED | OUTPATIENT
Start: 2022-06-02 | End: 2022-06-02 | Stop reason: HOSPADM

## 2022-06-02 RX ORDER — PROMETHAZINE HYDROCHLORIDE 12.5 MG/1
12.5 TABLET ORAL EVERY 6 HOURS PRN
Status: DISCONTINUED | OUTPATIENT
Start: 2022-06-02 | End: 2022-06-02 | Stop reason: HOSPADM

## 2022-06-02 NOTE — PROGRESS NOTES
Patient advised to present to labor and delivery for extended fetal monitoring for 3-4 hours. If discharged home, advise plan of care as previously advised, given the maternal/fetal medical/obstetrical complications of pregnancy. Kick counts,  labor, and preeclamptic precautions reviewed. Continue current management and care. Compliance with regular prenatal care strongly encouraged. Blood sugars reviewed (adequate glycemic control overall outside of some sporadically elevated blood sugar values).      Strongly advised patient to be compliant with blood sugar monitoring as previously advised to minimize the potential of adverse  outcomes (e.g. fetal demise/stillbirth, polyhydramnios/oligohydramnios, fetal growth abnormalities, pregnancy loss, hypertensive disorders of pregnancy, indicated  delivery, etc.), potential maternal morbidities, etc.     I would advise delivery between currently and by 40 6/7 weeks' gestation for fetal growth restriction (with concurrent finding of maternal chronic hypertension), as per [employer] of [employer]st[employer]nec[employer] and the Society for Maternal-Fetal Medicine guidelines in the ACOG Committee Opinion Number 381 (\"Medically Indicated Late- and Early-Term Deliveries\", Obstetrics & Gynecology, Volume 137, NO.2, 2021), and immediately with abnormalities in doppler waveforms, non-reassuring fetal heart tones/status, deterioration in biophysical profile testing, oligohydramnios with an ZEKE < 5.0 cm, development of other further fetal/maternal medical and obstetric complications of pregnancy, etc.

## 2022-06-02 NOTE — PROGRESS NOTES
Maternal Fetal Medicine Resident Note    Ciera Mac is a 24 y.o. female  at 35w1d     Patient seen for MFM ultrasound today. NST with one prolonged deceleration with anam to 100bpm which resolved spontaneously. BPP 8/8, normal UADs/MCAs. Please refer to report for further details. Advised patient we would recommend evaluation and management on labor and delivery. Patient denies abdominal cramping, contractions, vaginal pressure, back pain, or leakage of fluid. Patient agreeable to plan. Dr. Jose A Rodas, attending, notified and agreeable to plan. Labor and delivery notified.     Sierra Pena DO  Ob/Gyn Resident  [facility].

## 2022-06-02 NOTE — PROGRESS NOTES
Ob/Gyn Resident Interval Note         Tracing reviewed and remains Category 1, reactive. No contractions noted. Patient reports she is feeling well without complaint. Sbe denies abdominal pain or contractions and reports good fetal movement. Patient is stable for discharge at this time. Patient was given  labor precautions and instructed to call her Ob office or return to L&D Triage if she experiences contractions that increase in intensity or become closer together, if she experiences vaginal bleeding, leakage of fluids, or decreased fetal movements. She states she understands. Vitals:    22 1304   BP: 115/71   Pulse: 77   Resp: 16   Temp: 98.2 °F (36.8 °C)   TempSrc: Oral         Recent Results (from the past 12 hour(s))   POC Glucose Fingerstick    Collection Time: 22  1:34 PM   Result Value Ref Range    POC Glucose 84 65 - 105 mg/dL           Plan discussed with attending.        Zhang Riley DO  Ob/Gyn Resident  Pager: [phone] [address] [facility]   [address]:73 PM

## 2022-06-02 NOTE — H&P
OBSTETRICAL HISTORY [facility]    Date: 2022       Time: 1:51 PM   Patient Name: Shae Jay     Patient : 2000  Room/Bed: [place] [address]    Admission Date/Time: 2022 12:53 PM      CC: Extended monitoring     HPI: Shae Jay is a 24 y.o.  at 35w1d who presents from [facility] for extended monitoring 2/2 prolonged deceleration on NST. BPP 8/8, normal UADs/MCAs. Patient denies any fever, chills, N/V, headaches, vision changes, chest pain, shortness of breath, RUQ pain, abdominal pain. Patient denies any vaginal discharge and any urinary complaints. The patient reports fetal movement is present, denies contractions, denies loss of fluid, denies vaginal bleeding. DATING:  LMP: Patient's last menstrual period was 10/10/2021 (approximate).   Estimated Date of Delivery: 22   Based on: ultrasound, at 15 6/7 weeks GA    PREGNANCY RISK FACTORS:  Patient Active Problem List   Diagnosis    GERD    Orthostatic Hypotension     Martha-Parkinson-White    Anemia    Elv Early 1 hr GTT    Late prenatal care    GBS Positive    Fetal Choroid plexus cyst    cHTN (no meds)    UTI (urinary tract infection)    Gestational diabetes    IUGR (intrauterine growth restriction) 2022    Iron deficiency anemia due to chronic blood loss    35 weeks gestation of pregnancy        Steroids Given In This Pregnancy:  no     REVIEW OF SYSTEMS:   Constitutional: negative fever, negative chills  HEENT: negative visual disturbances, negative headaches  Respiratory: negative dyspnea, negative cough  Cardiovascular: negative chest pain,  negative palpitations  Gastrointestinal: negative abdominal pain, negative RUQ pain, negative N/V, negative diarrhea, negative constipation  Genitourinary: negative dysuria, negative vaginal discharge, negative vaginal bleeding  Dermatological: negative rash  Hematologic: negative bruising  Immunologic/Lymphatic: negative recent illness, negative recent sick contact  Musculoskeletal: negative back pain, negative myalgias, negative arthralgias  Neurological:  negative dizziness, negative weakness  Behavior/Psych: negative depression, negative anxiety    OBSTETRICAL HISTORY:   OB History    Para Term  AB Living   1 0 0 0 0 0   SAB IAB Ectopic Molar Multiple Live Births   0 0 0 0 0 0      # Outcome Date GA Lbr Antony/2nd Weight Sex Delivery Anes PTL Lv   1 Current                PAST MEDICAL HISTORY:   has a past medical history of Dizziness and Martha-Parkinson-White pattern. PAST SURGICAL HISTORY:   has a past surgical history that includes ablation of dysrhythmic focus. ALLERGIES:  has No Known Allergies. MEDICATIONS:  Prior to Admission medications    Medication Sig Start Date End Date Taking? Authorizing Provider   Prenatal Vit-Fe Fumarate-FA (PRENATAL VITAMINS PO) Take 1 tablet by mouth daily  Patient not taking: Reported on 2022    Historical Provider, MD   Blood Glucose Monitoring Suppl (ONE TOUCH ULTRA 2) w/Device KIT  22   Historical Provider, MD Simental Sulema strip  22   Historical Provider, MD   Lancets ([address]) [facility]  22   Historical Provider, MD   potassium chloride (KLOR-CON M) 20 MEQ extended release tablet Take 1 tablet by mouth 2 times daily 5/3/22   CHANCE Jameson CNM   ferrous sulfate dried (SLOW RELEASE IRON) 160 (50 Fe) MG TBCR extended release tablet Take 160 mg by mouth 2 times daily 3/15/22 5/27/23  CHANCE Enriquez - CNP   Pyridoxine HCl (B-6) 50 MG TABS Take 1 tablet by mouth nightly 21   Rissa Carreno MD       FAMILY HISTORY:  family history includes Diabetes in her father; Hypertension in her father and mother. SOCIAL HISTORY:   reports that she has never smoked. She has never used smokeless tobacco. She reports that she does not drink alcohol and does not use drugs.     VITALS:  Vitals:    22 1304   BP: 115/71   Pulse: 77   Resp: 16 Temp: 98.2 °F (36.8 °C)   TempSrc: Oral         PHYSICAL EXAM:  Fetal Heart Monitor:  Baseline Heart Rate 125, moderate variability, present accelerations, absent decelerations  [place]: uterine irritability    General appearance:  no apparent distress, alert, and cooperative  HEENT: head atraumatic, normocephalic, moist mucous membranes, trachea midline  Neurologic:  alert, oriented, normal speech, no focal findings or movement disorder noted  Lungs:  No increased work of breathing, good air exchange, clear to auscultation bilaterally, no crackles or wheezing  Heart:  regular rate and rhythm and no murmur    Abdomen:  soft, gravid, non-tender, no rebound, guarding, or rigidity, and no RUQ or epigastric tenderness  Extremities:  no calf tenderness, non edematous, DTR's: +2/4 bilateral lower extremities   Musculoskeletal: Gross strength equal and intact throughout, no gross abnormalities, range of motion normal in hips, knees, shoulders and spine, CVA tenderness: none  Psychiatric: Mood appropriate, normal affect   Rectal Exam: not indicated  Sterile Speculum Exam: not indicated        OMM EXAM:  Chief Complaint: Pregnancy  Reason for No Exam (if applicable): not applicable  Anterior/ Posterior Spinal Curves: Lumbar Lordosis -  Slightly increased  Assessment Tool: T= Tenderness, A= Asymmetry, R= Restricted Motion (A=Active, P=Passive), T=Tissue Texture Changes  Region Evaluated : Severity / Specific of Major Somatic Dysfunction: M99.03 Lumbar -  Minor TART  Major Correlations with: Gravid  Structural Diagnosis: Lumbar lordosis slightly increased 2/2 pregnancy  Treatment Plan: Outpatient       PRENATAL LAB RESULTS:   Blood Type/Rh: A pos  Antibody Screen: negative  Hemoglobin, Hematocrit, Platelets: 8.6 / 27 / 410  Rubella: immune  T.  Pallidum, IgG: non-reactive   Hepatitis B Surface Antigen: non-reactive   Hepatitis C antibody: not done  HIV: non-reactive   Sickle Cell Screen: negative  Gonorrhea: negative  Chlamydia: negative  Urine culture: negative, date: 2/10/22    Early 1 hour Glucose Tolerance Test: 134  3 hour Glucose Tolerance Test: Fastin; 1 hour: 178; 2 hour  184; 3 hour: 140    Group B Strep: positive  Cystic Fibrosis Screen: negative  First Trimester Screen: not available  MSAFP/Multiple Markers: normal  Non-Invasive Prenatal Testing: no aneuploidy detected  Anatomy US: unilateral fetal choroid plexus cysts otherwise normal male anatomy, 3vc, normal cord insertion, anterior placenta    ASSESSMENT & PLAN:  Domenica Fuentes is a 24 y.o. female  at 35w1d    - GBS positive / Rh positive / R immune   - Pen G for GBS prophylaxis if delivery imminent      Extended monitoring 2/2 decelerations on NST at [facility] appt   - Admit to labor and delivery under the service of Dr. Cyndi Marsh   - Patient with 2 minute prolonged deceleration to anam of 100bpm which spontaneously resolved on NST at [facility] office. - [facility] US showing BPP 8/8 and reassuring fetal status. [facility] recommended extended monitoring on L&D for evaluation.   - Patient denies any concerns or complaints. - VSS    - cEFM and TOCO   - Cat 1 FHT and TOCO showing uterine irritability   - Continue fetal monitoring closely for the next several hours.      cHTN (no meds)    - Blood pressure normotensive on admission   - Denies s/s preeclampsia   - Continue to monitor closely      FGR (AC <3%ile)    - EFW 4#7 on 22       GDMA1    - Elevated early 1hr GTT, patient did not complete early 3hr GTT. Elevated 3hr GTT.    - Blood sugars well controlled on no insulin   - FBS and 2hr PP ordered      Martha-Parkinson-White    - Patient is s/p ablation in    - Patient followed with peds cardiology until 2018 when no more follow up was required and she was discharged   - She denies any concerns or complaints   - Clinically asymptomatic   - Fetal echo wnl      Anemia    - Compliant with PO iron   - Hgb 8.1 22   - Clinically asymptomatic      Fetal Choroid plexus cyst    - NIPT wnl   - Seen on MFM US 2/23/22      Hx UTI x1        BMI 23    Patient Active Problem List    Diagnosis Date Noted    Gestational diabetes 05/09/2022     Priority: High     5/9/2022 consult MFM      IUGR (intrauterine growth restriction) 5/4/2022 05/09/2022     Priority: High    GBS Positive 02/14/2022     Priority: High     Treat in labor per protocol      Anemia 02/11/2022     Priority: High     2/11/2022 ferrous sulfate 325mg PO BID      Elv Early 1 hr GTT 02/11/2022     Priority: High     2/11/2022 3 hour GTT and Hgb a1c      Late prenatal care 02/11/2022     Priority: High    35 weeks gestation of pregnancy 06/02/2022     Priority: Medium    Iron deficiency anemia due to chronic blood loss 05/27/2022     Priority: Medium    UTI (urinary tract infection) 05/03/2022     Priority: Medium    Fetal Choroid plexus cyst 02/23/2022     Noted on anatomy ultrasound 2/23/22      cHTN (no meds) 02/23/2022     Diagnosed off two elevated blood pressures: one in 2018 and one in 2019      Martha-Parkinson-White 12/09/2016     s/p ablation   Fetal echo: **      Orthostatic Hypotension      GERD 11/25/2014       Plan discussed with Dr. Celine Mike, who is agreeable. Steroids given this admission: No    Risks, benefits, alternatives and possible complications have been discussed in detail with the patient. Admission, and post admission procedures and expectations were discussed in detail. All questions were answered.     Attending's Name: Dr. Frankie Colorado DO  Ob/Gyn Resident  6/2/2022, 1:51 PM

## 2022-06-06 ENCOUNTER — ROUTINE PRENATAL (OUTPATIENT)
Dept: OBGYN CLINIC | Age: 22
End: 2022-06-06
Payer: COMMERCIAL

## 2022-06-06 VITALS
SYSTOLIC BLOOD PRESSURE: 92 MMHG | HEART RATE: 77 BPM | WEIGHT: 138 LBS | BODY MASS INDEX: 23.69 KG/M2 | DIASTOLIC BLOOD PRESSURE: 60 MMHG

## 2022-06-06 DIAGNOSIS — I10 CHRONIC HYPERTENSION: ICD-10-CM

## 2022-06-06 DIAGNOSIS — O36.5990 POOR FETAL GROWTH AFFECTING MANAGEMENT OF MOTHER, ANTEPARTUM, SINGLE OR UNSPECIFIED FETUS: ICD-10-CM

## 2022-06-06 DIAGNOSIS — O09.30 LATE PRENATAL CARE: ICD-10-CM

## 2022-06-06 DIAGNOSIS — O99.820 GBS (GROUP B STREPTOCOCCUS CARRIER), +RV CULTURE, CURRENTLY PREGNANT: ICD-10-CM

## 2022-06-06 DIAGNOSIS — Z3A.35 35 WEEKS GESTATION OF PREGNANCY: Primary | ICD-10-CM

## 2022-06-06 DIAGNOSIS — O24.419 GESTATIONAL DIABETES MELLITUS (GDM), ANTEPARTUM, GESTATIONAL DIABETES METHOD OF CONTROL UNSPECIFIED: ICD-10-CM

## 2022-06-06 DIAGNOSIS — R73.09 ABNORMAL GLUCOSE TOLERANCE TEST (GTT): ICD-10-CM

## 2022-06-06 PROCEDURE — 99213 OFFICE O/P EST LOW 20 MIN: CPT | Performed by: NURSE PRACTITIONER

## 2022-06-06 PROCEDURE — 1036F TOBACCO NON-USER: CPT | Performed by: NURSE PRACTITIONER

## 2022-06-06 PROCEDURE — G8420 CALC BMI NORM PARAMETERS: HCPCS | Performed by: NURSE PRACTITIONER

## 2022-06-06 PROCEDURE — G8427 DOCREV CUR MEDS BY ELIG CLIN: HCPCS | Performed by: NURSE PRACTITIONER

## 2022-06-06 RX ORDER — PNV NO.95/FERROUS FUM/FOLIC AC 28MG-0.8MG
1 TABLET ORAL DAILY
Qty: 30 TABLET | Refills: 6 | Status: SHIPPED | OUTPATIENT
Start: 2022-06-06 | End: 2022-07-06

## 2022-06-06 NOTE — PROGRESS NOTES
Domenica Fuentes is a 24 y.o. female 27w7d        OB History    Para Term  AB Living   1             SAB IAB Ectopic Molar Multiple Live Births                    # Outcome Date GA Lbr Antony/2nd Weight Sex Delivery Anes PTL Lv   1 Current                Vitals  BP: 92/60  Weight: 138 lb (62.6 kg)  Heart Rate: 77  Patient Position: Sitting  Albumin: Trace  Glucose: Negative      The patient was seen and evaluated. There was positive fetal movements. No contractions or leakage of fluid. Signs and symptoms of  labor as well as labor were reviewed. The S/S of Pre-Eclampsia were reviewed with the patient in detail. She is to report any of these if they occur. She currently denies any of these. The patient had her 28 week labs ordered. Admission on 2022, Discharged on 2022   Component Date Value Ref Range Status    POC Glucose 2022 84  65 - 105 mg/dL Final   Hospital Outpatient Visit on 2022   Component Date Value Ref Range Status    Ferritin 2022 18  13 - 150 ng/mL Final    Hgb Electrophoresis Interp 2022 NORMAL HB ELECTROPHORESIS PATTERN. Final    Comment: HBA=97.2%  HBA2=2.8%        Normal Values:      Hemoglobin A:   96.5-98.5%      Hemoglobin A2:  1.5-3.5%            Pathologist 2022 ELECTRONICALLY SIGNED. Vania No M.D.    Final    Haptoglobin 2022 134  30 - 200 mg/dL Final    Retic % 2022 2.9* 0.5 - 2.0 % Final    Absolute Retic # 2022 0.122* 0.0245 - 0.098 M/uL Final    Iron 2022 37  37 - 145 ug/dL Final    TIBC 2022 565* 250 - 450 ug/dL Final    Iron Saturation 2022 7* 20 - 55 % Final    UIBC 2022 528* 112 - 347 ug/dL Final   Hospital Outpatient Visit on 2022   Component Date Value Ref Range Status    Color, UA 2022 Dark Yellow* Yellow Final    Turbidity UA 2022 Cloudy* Clear Final    Glucose, Ur 2022 SMALL* NEGATIVE Final    Bilirubin Urine 2022 NEGATIVE  Verified by ictotest.* NEGATIVE Final    Ketones, Urine 05/07/2022 TRACE* NEGATIVE Final    Specific [place], UA 05/07/2022 1.026  1.000 - 1.030 Final    Urine Hgb 05/07/2022 NEGATIVE  NEGATIVE Final    pH, UA 05/07/2022 6.0  5.0 - 8.0 Final    Protein, UA 05/07/2022 1+* NEGATIVE Final    Urobilinogen, Urine 05/07/2022 Normal  Normal Final    Nitrite, Urine 05/07/2022 NEGATIVE  NEGATIVE Final    Leukocyte Esterase, Urine 05/07/2022 MOD* NEGATIVE Final    Amount Glucose Given 05/07/2022 100  g Final    Glucose, Fasting 05/07/2022 103* 65 - 94 mg/dL Final    Glucose, GTT - 1 Hour 05/07/2022 178  65 - 179 mg/dL Final    Glucose, GTT - 2 Hour 05/07/2022 184* 65 - 154 mg/dL Final    3 Hr Glucose 05/07/2022 140* 65 - 139 mg/dL Final    Hemoglobin A1C 05/07/2022 5.5  4.0 - 6.0 % Final    Estimated Avg Glucose 05/07/2022 111  mg/dL Final    Comment: The ADA and AACC recommend providing the estimated average glucose result to permit better   patient understanding of their HBA1c result.       WBC 05/07/2022 6.5  4.5 - 13.5 k/uL Final    RBC 05/07/2022 3.80* 4.0 - 5.2 m/uL Final    Hemoglobin 05/07/2022 8.1* 12.0 - 16.0 g/dL Final    Hematocrit 05/07/2022 25.1* 36 - 46 % Final    MCV 05/07/2022 66.0* 80 - 100 fL Final    MCH 05/07/2022 21.4* 26 - 34 pg Final    MCHC 05/07/2022 32.4  31 - 37 g/dL Final    RDW 05/07/2022 20.9* 11.5 - 14.9 % Final    Platelets 47/23/9645 475* 150 - 450 k/uL Final    MPV 05/07/2022 7.8  6.0 - 12.0 fL Final    Seg Neutrophils 05/07/2022 54  34 - 64 % Final    Lymphocytes 05/07/2022 36  25 - 45 % Final    Monocytes 05/07/2022 8  2 - 8 % Final    Eosinophils % 05/07/2022 1  0 - 4 % Final    Basophils 05/07/2022 1  0 - 2 % Final    Segs Absolute 05/07/2022 3.50  1.3 - 9.1 k/uL Final    Absolute Lymph # 05/07/2022 2.34  1.0 - 4.8 k/uL Final    Absolute Mono # 05/07/2022 0.52  0.1 - 1.3 k/uL Final    Absolute Eos # 05/07/2022 0.07  0.0 - 0.4 k/uL Final    Basophils Absolute 05/07/2022 0.07  0.0 - 0.2 k/uL Final    Morphology 05/07/2022 ANISOCYTOSIS PRESENT   Final    Morphology 05/07/2022 HYPOCHROMIA PRESENT   Final    Morphology 05/07/2022 MICROCYTOSIS PRESENT   Final    Morphology 05/07/2022 1+ ELLIPTOCYTES   Final    WBC, UA 05/07/2022 21 TO 50  /HPF Final    RBC, UA 05/07/2022 0 TO 2  /HPF Final    Casts UA 05/07/2022 3 to 5  /LPF Final    Epithelial Cells UA 05/07/2022 10 TO 20  /HPF Final    Bacteria, UA 05/07/2022 MANY* None Final    Mucus, UA 05/07/2022 1+* None Final    Specimen Description 05/07/2022 . CLEAN CATCH URINE   Final    Culture 05/07/2022 NO SIGNIFICANT GROWTH   Final   Admission on 05/03/2022, Discharged on 05/03/2022   Component Date Value Ref Range Status    Color, UA 05/03/2022 Dark Yellow* Yellow Final    Turbidity UA 05/03/2022 Cloudy* Clear Final    Glucose, Ur 05/03/2022 TRACE* NEGATIVE Final    Bilirubin Urine 05/03/2022 NEGATIVE  Verified by ictotest.* NEGATIVE Final    Ketones, Urine 05/03/2022 4+* NEGATIVE Final    Specific Gravity, UA 05/03/2022 1.020  1.000 - 1.030 Final    Urine Hgb 05/03/2022 TRACE* NEGATIVE Final    pH, UA 05/03/2022 6.5  5.0 - 8.0 Final    Protein, UA 05/03/2022 1+* NEGATIVE Final    Urobilinogen, Urine 05/03/2022 Normal  Normal Final    Nitrite, Urine 05/03/2022 NEGATIVE  NEGATIVE Final    Leukocyte Esterase, Urine 05/03/2022 MOD* NEGATIVE Final    Specimen Description 05/03/2022 . CLEAN CATCH URINE   Final    Culture 05/03/2022 NO GROWTH   Final    Glucose 05/03/2022 101* 70 - 99 mg/dL Final    BUN 05/03/2022 5* 6 - 20 mg/dL Final    CREATININE 05/03/2022 0.50  0.50 - 0.90 mg/dL Final    Calcium 05/03/2022 8.8  8.6 - 10.4 mg/dL Final    Sodium 05/03/2022 131* 135 - 144 mmol/L Final    Potassium 05/03/2022 3.0* 3.7 - 5.3 mmol/L Final    Chloride 05/03/2022 96* 98 - 107 mmol/L Final    CO2 05/03/2022 20  20 - 31 mmol/L Final    Anion Gap 05/03/2022 15  9 - 17 mmol/L Final    Alkaline Phosphatase 05/03/2022 82  35 - 104 U/L Final    ALT 05/03/2022 11  5 - 33 U/L Final    AST 05/03/2022 22  <32 U/L Final    Total Bilirubin 05/03/2022 0.36  0.3 - 1.2 mg/dL Final    Total Protein 05/03/2022 7.6  6.4 - 8.3 g/dL Final    Albumin 05/03/2022 3.9  3.5 - 5.2 g/dL Final    GFR Non- 05/03/2022 >60  >60 mL/min Final    GFR  05/03/2022 >60  >60 mL/min Final    GFR Comment 05/03/2022        Final    Comment: Average GFR for 20-28 years old:   116 mL/min/1.73sq m  Chronic Kidney Disease:   <60 mL/min/1.73sq m  Kidney failure:   <15 mL/min/1.73sq m              eGFR calculated using average adult body mass. Additional eGFR calculator available at:        [employer]            - 05/03/2022        Final    WBC, UA 05/03/2022 10 TO 20  /HPF Final    RBC, UA 05/03/2022 3 to 5  /HPF Final    Epithelial Cells UA 05/03/2022 10 TO 20  /HPF Final    Bacteria, UA 05/03/2022 MODERATE* None Final    Mucus, UA 05/03/2022 1+* None Final   Admission on 05/03/2022, Discharged on 05/03/2022   Component Date Value Ref Range Status    Source 05/03/2022 . THROAT SWAB   Final    Strep A Ag 05/03/2022 NEGATIVE  NEGATIVE Final    Comment: Rapid Strep A negative. A negative Rapid Group A Strep Screen result does not rule out the   possibility of Group A Streptococci in the specimen. A Group A Strep DNA test is available   upon request.      Specimen Description 05/03/2022 . NASOPHARYNGEAL SWAB   Final    SARS-CoV-2, Rapid 05/03/2022 Not Detected  Not Detected Final    Comment:       Rapid NAAT:  The specimen is NEGATIVE for SARS-CoV-2, the novel coronavirus associated with   COVID-19. The ID NOW COVID-19 assay is designed to detect the virus that causes COVID-19 in patients   with signs and symptoms of infection who are suspected of COVID-19.   An individual without symptoms of COVID-19 and who is not shedding SARS-CoV-2 virus would expect to have a negative (not detected) result in this assay. Negative results should be treated as presumptive and, if inconsistent with clinical signs   and symptoms or necessary for patient management,  should be tested with an alternative molecular assay. Negative results do not preclude   SARS-CoV-2 infection and   should not be used as the sole basis for patient management decisions. Fact sheet for Healthcare Providers: Bowen.jean  Fact sheet for Patients: Bowen.jean          Methodology: Isothermal Nucleic Acid Amplification      Flu A Antigen 2022 NEGATIVE  NEGATIVE Final    for Influenza A Antigen    Flu B Antigen 2022 NEGATIVE  NEGATIVE Final    for Influenza B Antigen.   ]    22 tdap given in office  ASA 81 mg per MFM for the prevention of preeclampsia per MFM based on the ACOG/USPSTF guidelines     22 PRAF completed      T-Dap Vaccine (27-36 weeks): completed    The patient was instructed on fetal kick counts and was given a kick sheet to complete every 8 hours. She was instructed that the baby should move at a minimum of ten times within one hour after a meal. The patient was instructed to lay down on her left side twenty minutes after eating and count movements for up to one hour with a target value of ten movements. She was instructed to notify the office if she did not make that target after two attempts or if after any attempt there was less than four movements. The patient reports that the targets have been made Yes.  Testing:  Weekly with MFM    Assessment:  1Yolette Fuentes is a 24 y.o. female  2.    3. 35w5d    Patient Active Problem List    Diagnosis Date Noted    Gestational diabetes 2022     Priority: High     2022 consult MFM      IUGR (intrauterine growth restriction) 2022     Priority: High    cHTN (no meds) 2022     Priority: High Diagnosed off two elevated blood pressures: one in 2018 and one in 2019      GBS Positive 02/14/2022     Priority: High     Treat in labor per protocol      Anemia 02/11/2022     Priority: High     2/11/2022 ferrous sulfate 325mg PO BID      Elv Early 1 hr GTT 02/11/2022     Priority: High     2/11/2022 3 hour GTT and Hgb a1c      Late prenatal care 02/11/2022     Priority: High    35 weeks gestation of pregnancy 06/02/2022     Priority: Medium    Iron deficiency anemia due to chronic blood loss 05/27/2022     Priority: Medium    UTI (urinary tract infection) 05/03/2022     Priority: Medium    Fetal Choroid plexus cyst 02/23/2022     Noted on anatomy ultrasound 2/23/22  Resolved by 3/30/22      Martha-Parkinson-White 12/09/2016     s/p ablation   Fetal echo: wnl      Orthostatic Hypotension      GERD 11/25/2014        Diagnosis Orders   1. 35 weeks gestation of pregnancy     2. Gestational diabetes mellitus (GDM), antepartum, gestational diabetes method of control unspecified     3. Poor fetal growth affecting management of mother, antepartum, single or unspecified fetus     4. GBS (group B Streptococcus carrier), +RV culture, currently pregnant     5. Abnormal glucose tolerance test (GTT)     6. Late prenatal care     7. Chronic hypertension               Plan:  The patient will return to the office for her next visit in 1 weeks. See antepartum flow sheet. Denies S/S of labor. M 6/9/2022  Continue to check blood sugars QID. Counseling on Fetal Movement Kick Counts: The patient was instructed on fetal kick counts and was given a kick sheet to complete every 8 hours. She was instructed that the baby should move at a minimum of ten times within one hour after a meal. The patient was instructed to lay down on her left side twenty minutes after eating and count movements for up to one hour with a target value of ten movements.   She was instructed to notify the office if she did not make that target after two attempts or if after any attempt there was less than four movements. The patient reports that the targets have been made.  Testing Indicated: Yes  Scheduled with Nursing-Pt notified: Yes      Patient was seen with total face to face time of 20 minutes. More than 50% of this visit was on counseling and education regarding her    Diagnosis Orders   1. 35 weeks gestation of pregnancy     2. Gestational diabetes mellitus (GDM), antepartum, gestational diabetes method of control unspecified     3. Poor fetal growth affecting management of mother, antepartum, single or unspecified fetus     4. GBS (group B Streptococcus carrier), +RV culture, currently pregnant     5. Abnormal glucose tolerance test (GTT)     6. Late prenatal care     7. Chronic hypertension      and her options. She was also counseled on her preventative health maintenance recommendations and follow-up.

## 2022-06-09 ENCOUNTER — ROUTINE PRENATAL (OUTPATIENT)
Dept: PERINATAL CARE | Age: 22
End: 2022-06-09
Payer: COMMERCIAL

## 2022-06-09 VITALS
TEMPERATURE: 97.3 F | DIASTOLIC BLOOD PRESSURE: 68 MMHG | HEIGHT: 64 IN | BODY MASS INDEX: 23.56 KG/M2 | RESPIRATION RATE: 16 BRPM | HEART RATE: 75 BPM | SYSTOLIC BLOOD PRESSURE: 102 MMHG | WEIGHT: 138 LBS

## 2022-06-09 DIAGNOSIS — O36.5930 INTRAUTERINE GROWTH RESTRICTION (IUGR) AFFECTING CARE OF MOTHER, THIRD TRIMESTER, SINGLE GESTATION: Primary | ICD-10-CM

## 2022-06-09 DIAGNOSIS — O24.410 DIET CONTROLLED GESTATIONAL DIABETES MELLITUS (GDM), ANTEPARTUM: ICD-10-CM

## 2022-06-09 DIAGNOSIS — O99.413: ICD-10-CM

## 2022-06-09 DIAGNOSIS — Z3A.36 36 WEEKS GESTATION OF PREGNANCY: ICD-10-CM

## 2022-06-09 DIAGNOSIS — O16.3 HYPERTENSION AFFECTING PREGNANCY IN THIRD TRIMESTER: ICD-10-CM

## 2022-06-09 PROCEDURE — 76818 FETAL BIOPHYS PROFILE W/NST: CPT | Performed by: OBSTETRICS & GYNECOLOGY

## 2022-06-09 PROCEDURE — 76821 MIDDLE CEREBRAL ARTERY ECHO: CPT | Performed by: OBSTETRICS & GYNECOLOGY

## 2022-06-09 PROCEDURE — 99999 PR OFFICE/OUTPT VISIT,PROCEDURE ONLY: CPT | Performed by: OBSTETRICS & GYNECOLOGY

## 2022-06-09 PROCEDURE — 76820 UMBILICAL ARTERY ECHO: CPT | Performed by: OBSTETRICS & GYNECOLOGY

## 2022-06-09 NOTE — PROGRESS NOTES
Blood sugars reviewed (adequate glycemic control overall outside of some sporadically elevated blood sugar values).      Strongly advised patient to be compliant with blood sugar monitoring as previously advised to minimize the potential of adverse  outcomes (e.g. fetal demise/stillbirth, polyhydramnios/oligohydramnios, fetal growth abnormalities, pregnancy loss, hypertensive disorders of pregnancy, indicated  delivery, etc.), potential maternal morbidities, etc.     I would advise delivery between currently and by 40 6/7 weeks' gestation for fetal growth restriction (with concurrent finding of maternal chronic hypertension), as per The [address] [place] [address] of Obstetricians and Gynecologists and the Society for Maternal-Fetal Medicine guidelines in the ACOG Committee Opinion Number 180 (\"Medically Indicated Late- and Early-Term Deliveries\", Obstetrics & Gynecology, Volume 137, NO.2, 2021), and immediately with abnormalities in doppler waveforms, non-reassuring fetal heart tones/status, deterioration in biophysical profile testing, oligohydramnios with an ZEKE < 5.0 cm, development of other further fetal/maternal medical and obstetric complications of pregnancy, etc.

## 2022-06-16 ENCOUNTER — ROUTINE PRENATAL (OUTPATIENT)
Dept: PERINATAL CARE | Age: 22
DRG: 560 | End: 2022-06-16
Payer: COMMERCIAL

## 2022-06-16 VITALS
TEMPERATURE: 97.1 F | BODY MASS INDEX: 23.69 KG/M2 | SYSTOLIC BLOOD PRESSURE: 110 MMHG | HEIGHT: 64 IN | RESPIRATION RATE: 16 BRPM | DIASTOLIC BLOOD PRESSURE: 72 MMHG | HEART RATE: 96 BPM

## 2022-06-16 DIAGNOSIS — Z3A.37 37 WEEKS GESTATION OF PREGNANCY: ICD-10-CM

## 2022-06-16 DIAGNOSIS — O99.413: ICD-10-CM

## 2022-06-16 DIAGNOSIS — O24.410 DIET CONTROLLED GESTATIONAL DIABETES MELLITUS (GDM), ANTEPARTUM: ICD-10-CM

## 2022-06-16 DIAGNOSIS — O36.5930 INTRAUTERINE GROWTH RESTRICTION (IUGR) AFFECTING CARE OF MOTHER, THIRD TRIMESTER, SINGLE GESTATION: Primary | ICD-10-CM

## 2022-06-16 DIAGNOSIS — Z36.4 ANTENATAL SCREENING FOR FETAL GROWTH RETARDATION USING ULTRASONICS: ICD-10-CM

## 2022-06-16 DIAGNOSIS — O16.3 HYPERTENSION AFFECTING PREGNANCY IN THIRD TRIMESTER: ICD-10-CM

## 2022-06-16 PROCEDURE — 76816 OB US FOLLOW-UP PER FETUS: CPT | Performed by: OBSTETRICS & GYNECOLOGY

## 2022-06-16 PROCEDURE — 76821 MIDDLE CEREBRAL ARTERY ECHO: CPT | Performed by: OBSTETRICS & GYNECOLOGY

## 2022-06-16 PROCEDURE — 76820 UMBILICAL ARTERY ECHO: CPT | Performed by: OBSTETRICS & GYNECOLOGY

## 2022-06-16 PROCEDURE — 76818 FETAL BIOPHYS PROFILE W/NST: CPT | Performed by: OBSTETRICS & GYNECOLOGY

## 2022-06-16 PROCEDURE — 99999 PR OFFICE/OUTPT VISIT,PROCEDURE ONLY: CPT | Performed by: OBSTETRICS & GYNECOLOGY

## 2022-06-16 NOTE — PROGRESS NOTES
Blood sugars reviewed (adequate glycemic control overall outside of some sporadically elevated blood sugar values).      Strongly advised patient to be compliant with blood sugar monitoring as previously advised to minimize the potential of adverse  outcomes (e.g. fetal demise/stillbirth, polyhydramnios/oligohydramnios, fetal growth abnormalities, pregnancy loss, hypertensive disorders of pregnancy, indicated  delivery, etc.), potential maternal morbidities, etc.     I would advise delivery between currently and by 40 6/7 weeks' gestation for fetal growth restriction (with concurrent finding of maternal chronic hypertension), as per The [facility] [employer] of Obstetricians and Gynecologists and the Society for Maternal-Fetal Medicine guidelines in the ACOG Committee Opinion Number 445 (\"Medically Indicated Late- and Early-Term Deliveries\", Obstetrics & Gynecology, Volume 137, NO.2, 2021), and immediately with abnormalities in doppler waveforms, non-reassuring fetal heart tones/status, deterioration in biophysical profile testing, oligohydramnios with an ZEKE < 5.0 cm, development of other further fetal/maternal medical and obstetric complications of pregnancy, etc.

## 2022-06-17 ENCOUNTER — HOSPITAL ENCOUNTER (OUTPATIENT)
Age: 22
Setting detail: SPECIMEN
Discharge: HOME | End: 2022-06-17

## 2022-06-17 ENCOUNTER — ROUTINE PRENATAL (OUTPATIENT)
Dept: OBGYN CLINIC | Age: 22
End: 2022-06-17
Payer: COMMERCIAL

## 2022-06-17 VITALS
HEART RATE: 99 BPM | SYSTOLIC BLOOD PRESSURE: 112 MMHG | DIASTOLIC BLOOD PRESSURE: 60 MMHG | BODY MASS INDEX: 24.03 KG/M2 | WEIGHT: 140 LBS

## 2022-06-17 DIAGNOSIS — O99.820 GBS (GROUP B STREPTOCOCCUS CARRIER), +RV CULTURE, CURRENTLY PREGNANT: ICD-10-CM

## 2022-06-17 DIAGNOSIS — R73.09 ABNORMAL GLUCOSE TOLERANCE TEST (GTT): ICD-10-CM

## 2022-06-17 DIAGNOSIS — I10 CHRONIC HYPERTENSION: ICD-10-CM

## 2022-06-17 DIAGNOSIS — O24.419 GESTATIONAL DIABETES MELLITUS (GDM), ANTEPARTUM, GESTATIONAL DIABETES METHOD OF CONTROL UNSPECIFIED: ICD-10-CM

## 2022-06-17 DIAGNOSIS — O36.5990 POOR FETAL GROWTH AFFECTING MANAGEMENT OF MOTHER, ANTEPARTUM, SINGLE OR UNSPECIFIED FETUS: ICD-10-CM

## 2022-06-17 DIAGNOSIS — O09.30 LATE PRENATAL CARE: ICD-10-CM

## 2022-06-17 DIAGNOSIS — Z3A.37 37 WEEKS GESTATION OF PREGNANCY: ICD-10-CM

## 2022-06-17 DIAGNOSIS — Z3A.37 37 WEEKS GESTATION OF PREGNANCY: Primary | ICD-10-CM

## 2022-06-17 PROCEDURE — 1036F TOBACCO NON-USER: CPT | Performed by: OBSTETRICS & GYNECOLOGY

## 2022-06-17 PROCEDURE — G8427 DOCREV CUR MEDS BY ELIG CLIN: HCPCS | Performed by: OBSTETRICS & GYNECOLOGY

## 2022-06-17 PROCEDURE — 99213 OFFICE O/P EST LOW 20 MIN: CPT | Performed by: OBSTETRICS & GYNECOLOGY

## 2022-06-17 PROCEDURE — G8420 CALC BMI NORM PARAMETERS: HCPCS | Performed by: OBSTETRICS & GYNECOLOGY

## 2022-06-17 NOTE — PROGRESS NOTES
Karo Tony is a 24 y.o. female 42w2d        OB History    Para Term  AB Living   1             SAB IAB Ectopic Molar Multiple Live Births                    # Outcome Date GA Lbr Antony/2nd Weight Sex Delivery Anes PTL Lv   1 Current                Vitals  BP: 112/60  Weight: 140 lb (63.5 kg)  Heart Rate: 99  Patient Position: Sitting  Albumin: Trace  Glucose: Negative      The patient was seen and evaluated. There was positive fetal movements. No contractions or leakage of fluid. Signs and symptoms of labor were reviewed. The S/S of Pre-Eclampsia were reviewed with the patient in detail. She is to report any of these if they occur. She currently denies any of these. The patient was instructed on fetal kick counts and was given a kick sheet to complete every 8 hours. She was instructed that the baby should move at a minimum of ten times within one hour after a meal. The patient was instructed to lay down on her left side twenty minutes after eating and count movements for up to one hour with a target value of ten movements. She was instructed to notify the office if she did not make that target after two attempts or if after any attempt there was less than four movements. The patient reports that the targets have been made Yes.    2022 advise delivery by 37 6/7 weeks for IUGR and chronic hypertension. 22 tdap given in office  ASA 81 mg per MFM for the prevention of preeclampsia per MFM based on the ACOG/USPSTF guidelines     22 PRAF completed      T-Dap Vaccine Completed (27-36 weeks): Completed     Allergies: Allergies as of 2022    (No Known Allergies)         Group Beta Strep collection was completed.  Yes  GBS Results:   Admission on 2022, Discharged on 2022   Component Date Value Ref Range Status    POC Glucose 2022 84  65 - 105 mg/dL Final   ]        Cervical Exam was:   fingertip cm dilated    The literature regarding a questionable link to pitocin augmentation and induction of labor, the assistance of labor contractions and the initiation of contractions to help delivery, have been reviewed with the patient regarding the increased potential of having a  with Attention Deficit Hyperactivity Disorder and or Autism. These two disorders and the ramifications of their impact on a child and the family caring for that child has been reviewed with the patient in detail. She was given the risks, benefits and alternatives of the use of this medication. She has agreed to its use in the delivery of her unborn child if needed at the time of delivery, Yes. The patient was counseled on the mandatory call ahead policy. She has been instructed to call the office at anytime prior to going into the hospital so the on-call physician may direct her to the appropriate facility for care. Exceptions were reviewed including but not limited to: Decreased fetal movement, vaginal Bleeding or hemorrhage, trauma, readily expectant delivery, or any instance where she feels 911 should be utilized. The patient was counseled on Labor & Delivery. Route of delivery and counseling on vaginal, operative vaginal, and  sections were completed with the risks of each to both the patient as well as her baby. The possibility of a blood transfusion was discussed as well. The patient was not opposed to receiving a transfusion if needed. The patient was counseled on types of analgesia during labor and is considering either Regional or IV medication the risks, benefits and alternatives were discussed.  Testing:  Weekly MFM        Assessment:  1Yolette Hendricks is a 24 y.o. female  2.    3. 37w2d    Patient Active Problem List    Diagnosis Date Noted    Gestational diabetes 2022     Priority: High     Overview Note:     2022 consult MFM      IUGR (intrauterine growth restriction) 2022     Priority: High    cHTN (no meds) 2022 Priority: High     Overview Note:     Diagnosed off two elevated blood pressures: one in 2018 and one in 2019      GBS Positive 02/14/2022     Priority: High     Overview Note:     Treat in labor per protocol      Anemia 02/11/2022     Priority: High     Overview Note:     2/11/2022 ferrous sulfate 325mg PO BID      Elv Early 1 hr GTT 02/11/2022     Priority: High     Overview Note:     2/11/2022 3 hour GTT and Hgb a1c      Late prenatal care 02/11/2022     Priority: High    35 weeks gestation of pregnancy 06/02/2022     Priority: Medium    Iron deficiency anemia due to chronic blood loss 05/27/2022     Priority: Medium    UTI (urinary tract infection) 05/03/2022     Priority: Medium    Fetal Choroid plexus cyst 02/23/2022     Overview Note:     Noted on anatomy ultrasound 2/23/22  Resolved by 3/30/22      Martha-Parkinson-White 12/09/2016     Overview Note:     s/p ablation   Fetal echo: wnl      Orthostatic Hypotension      GERD 11/25/2014        Diagnosis Orders   1. 37 weeks gestation of pregnancy  Culture, Strep B Screen, Vaginal/Rectal   2. Gestational diabetes mellitus (GDM), antepartum, gestational diabetes method of control unspecified     3. Poor fetal growth affecting management of mother, antepartum, single or unspecified fetus     4. GBS (group B Streptococcus carrier), +RV culture, currently pregnant     5. Abnormal glucose tolerance test (GTT)     6. Late prenatal care     7. Chronic hypertension             Plan:  The patient will return to the office for her next visit in 2-3 weeks for postpartum visit. See antepartum flow sheet. Recommendation for IOL per MFM 37-38 weeks d/t FGR. Pt wishes IOL 6/19/2022 8 pm. Risks/ benefits/ altenative options reviewed    Medical Induction of Labor     Definition   In some cases, the doctor needs to help labor begin by using:   Medicine   Other procedures   This is done instead of waiting for your body to go into labor on its own.  In the [address] [facility] [address], nearly one in five labors is induced. Reasons for Procedure   The most common reason to have an induction is that the pregnancy has gone two or more weeks past the due date. In this situation, the baby may:   Get too large for a vaginal delivery   Not be able to receive enough oxygen through the placenta (the organ that links the mother and the baby)   Other reasons for induction include:   Water breaks and contractions do not begin   High blood pressure or diabetes in the mother   Infection in the uterus   The baby is not growing properly   Low amniotic fluid level   Possible Complications   The same complications that may occur from a spontaneous delivery also apply to an induced delivery, as well as the following risks:   Stalled labor--If the medicine does not trigger labor, you may need a  section (). Strong contractions--The medicine that causes contractions could make them too strong. Although rare, this can lead to fetal distress and uterine rupture. In the event that your contractions are too strong, your doctor will lower the dose or stop the medicine. Be sure to discuss these risks with your doctor before the procedure. What to Expect   Prior to Procedure   While the same instructions apply for an induced labor as for a spontaneous birth, there are some differences. Do not eat too much before arriving at the hospital. (It is okay to have clear fluids, though.) The medicines can create very strong contractions and could upset your stomach. Contractions slow the digestive process, so your stomach will remain full. This can cause a problem if you need general anesthesia . Description of the Procedure Cervical Ripening   To deliver your baby vaginally, the cervix will need to [address] 8.  This means it needs to soften, thin, and open to prepare for delivery.  If your cervix is not doing this already, your doctor may aid this process by giving you medicine, which may be a:   Gel that is applied to the cervix   Suppository put in the vagina   Pill taken by mouth   The cervical ripening process can last for up to a few days. There are also procedures that your doctor may try to aid cervical ripening, such as:   Strip the membranes (separate your cervix from the tissues around the baby's head)   Expand a small balloon-tipped catheter in the uterus   Place small cylinders that contain a type of sponge-like seaweed into the uterus     Changes in the Cervix During Pregnancy       © 2011 [address] [place] [address].   Contractions   If contractions have not started once your cervix is ripe, your doctor will give you a drug that causes contractions. The drug is a man-made version of a hormone called oxytocin. This hormone is produced by your body during active labor. The drug will be adjusted during labor to strengthen or weaken the contractions. Once contractions begin, the labor and birth process will be the same as a spontaneous delivery. Anesthesia   The same pain medicines are available for an induced labor as for a spontaneous delivery, including:   Pain medicine given into your vein   Epidural block   Spinal block   Local anesthesia   Immediately After Procedure   If everything goes well, after the induction you will vaginally deliver a healthy baby. How Long Will It Take? It can be hours to several days (very rarely) from the time you are induced until the delivery. If your cervix is not ripe when you are scheduled for the induction, labor and delivery could take 2-3 days. It could take longer for first-time mothers and for pre-term babies. How Much Will It Hurt? Labor causes severe pain. Talk to your doctor about ways to manage the pain. [address] Stay   The usual length of stay is 1-3 days. Your doctor may choose to keep you longer if you have any problems. Postoperative Care   The care after an induced labor is the same as for a spontaneous birth.    Call Your Doctor   When you go home after having a vaginal delivery, call your doctor if any of the following occurs: An unexplained fever of 100.4°F (38°C) or above in the first two weeks   Soaking more than one sanitary napkin an hour or if the bleeding level increases   Wounds that become red, swollen, or drain pus   New pain, swelling, or tenderness in your legs   Hot-to-the-touch, significantly reddened, sore breasts   Any cracking or bleeding from the nipple or areola (the dark-colored area of the breast)   Foul-smelling vaginal discharge   Painful urination or a sudden urge to urinate, inability to control urination   Increasing pain in the vaginal area   Cough or chest pain, nausea, or vomiting   Depression, hallucinations, suicidal thoughts, or any thoughts of harming your baby   In case of an emergency, CALL 911 . Patient was seen with total face to face time of 20 minutes. More than 50% of this visit was on counseling and education regarding her    Diagnosis Orders   1. 37 weeks gestation of pregnancy  Culture, Strep B Screen, Vaginal/Rectal   2. Gestational diabetes mellitus (GDM), antepartum, gestational diabetes method of control unspecified     3. Poor fetal growth affecting management of mother, antepartum, single or unspecified fetus     4. GBS (group B Streptococcus carrier), +RV culture, currently pregnant     5. Abnormal glucose tolerance test (GTT)     6. Late prenatal care     7. Chronic hypertension      and her options. She was also counseled on her preventative health maintenance recommendations and follow-up.

## 2022-06-19 ENCOUNTER — HOSPITAL ENCOUNTER (INPATIENT)
Age: 22
LOS: 3 days | Discharge: HOME | DRG: 560 | End: 2022-06-22
Attending: OBSTETRICS & GYNECOLOGY | Admitting: OBSTETRICS & GYNECOLOGY
Payer: COMMERCIAL

## 2022-06-19 ENCOUNTER — APPOINTMENT (OUTPATIENT)
Dept: LABOR AND DELIVERY | Age: 22
DRG: 560 | End: 2022-06-19
Payer: COMMERCIAL

## 2022-06-19 DIAGNOSIS — D64.9 ANEMIA, UNSPECIFIED TYPE: Primary | ICD-10-CM

## 2022-06-19 PROBLEM — Z3A.35 35 WEEKS GESTATION OF PREGNANCY: Status: RESOLVED | Noted: 2022-06-02 | Resolved: 2022-06-19

## 2022-06-19 PROBLEM — Z3A.37 37 WEEKS GESTATION OF PREGNANCY: Status: ACTIVE | Noted: 2022-06-19

## 2022-06-19 LAB
ABO/RH: NORMAL
ABSOLUTE EOS #: 0 K/UL (ref 0–0.4)
ABSOLUTE IMMATURE GRANULOCYTE: 0 K/UL (ref 0–0.3)
ABSOLUTE LYMPH #: 2.18 K/UL (ref 1–4.8)
ABSOLUTE MONO #: 0.4 K/UL (ref 0.1–1.4)
ALBUMIN SERPL-MCNC: 3.9 G/DL (ref 3.5–5.2)
ALBUMIN/GLOBULIN RATIO: 1.2 (ref 1–2.5)
ALP BLD-CCNC: 133 U/L (ref 35–104)
ALT SERPL-CCNC: 8 U/L (ref 5–33)
ANION GAP SERPL CALCULATED.3IONS-SCNC: 13 MMOL/L (ref 9–17)
ANTIBODY SCREEN: NEGATIVE
ARM BAND NUMBER: NORMAL
AST SERPL-CCNC: 18 U/L
ATYPICAL LYMPHOCYTE ABSOLUTE COUNT: 0.2 K/UL
ATYPICAL LYMPHOCYTES: 2 %
BASOPHILS # BLD: 0 % (ref 0–2)
BASOPHILS ABSOLUTE: 0 K/UL (ref 0–0.2)
BILIRUB SERPL-MCNC: 0.33 MG/DL (ref 0.3–1.2)
BUN BLDV-MCNC: 8 MG/DL (ref 6–20)
CALCIUM SERPL-MCNC: 9.1 MG/DL (ref 8.6–10.4)
CHLORIDE BLD-SCNC: 101 MMOL/L (ref 98–107)
CO2: 21 MMOL/L (ref 20–31)
CREAT SERPL-MCNC: 0.41 MG/DL (ref 0.5–0.9)
EOSINOPHILS RELATIVE PERCENT: 0 % (ref 1–4)
EXPIRATION DATE: NORMAL
GFR AFRICAN AMERICAN: >60 ML/MIN
GFR NON-AFRICAN AMERICAN: >60 ML/MIN
GFR SERPL CREATININE-BSD FRML MDRD: ABNORMAL ML/MIN/{1.73_M2}
GLUCOSE BLD-MCNC: 92 MG/DL (ref 70–99)
HCT VFR BLD CALC: 29.6 % (ref 36.3–47.1)
HEMOGLOBIN: 8.9 G/DL (ref 11.9–15.1)
IMMATURE GRANULOCYTES: 0 %
LYMPHOCYTES # BLD: 22 % (ref 25–45)
MCH RBC QN AUTO: 22.7 PG (ref 25.2–33.5)
MCHC RBC AUTO-ENTMCNC: 30.1 G/DL (ref 28.4–34.8)
MCV RBC AUTO: 75.5 FL (ref 82.6–102.9)
MONOCYTES # BLD: 4 % (ref 2–8)
MORPHOLOGY: ABNORMAL
NRBC AUTOMATED: 0 PER 100 WBC
PDW BLD-RTO: 20.8 % (ref 11.8–14.4)
PLATELET # BLD: 364 K/UL (ref 138–453)
PMV BLD AUTO: 10.7 FL (ref 8.1–13.5)
POTASSIUM SERPL-SCNC: 3.7 MMOL/L (ref 3.7–5.3)
RBC # BLD: 3.92 M/UL (ref 3.95–5.11)
SEG NEUTROPHILS: 72 % (ref 34–64)
SEGMENTED NEUTROPHILS ABSOLUTE COUNT: 7.12 K/UL (ref 1.8–7.7)
SODIUM BLD-SCNC: 135 MMOL/L (ref 135–144)
TOTAL PROTEIN: 7.2 G/DL (ref 6.4–8.3)
WBC # BLD: 9.9 K/UL (ref 4.5–13.5)

## 2022-06-19 PROCEDURE — 6360000002 HC RX W HCPCS

## 2022-06-19 PROCEDURE — 6370000000 HC RX 637 (ALT 250 FOR IP)

## 2022-06-19 PROCEDURE — 59200 INSERT CERVICAL DILATOR: CPT

## 2022-06-19 PROCEDURE — 1220000000 HC SEMI PRIVATE OB R&B

## 2022-06-19 PROCEDURE — 86850 RBC ANTIBODY SCREEN: CPT

## 2022-06-19 PROCEDURE — 80053 COMPREHEN METABOLIC PANEL: CPT

## 2022-06-19 PROCEDURE — 86900 BLOOD TYPING SEROLOGIC ABO: CPT

## 2022-06-19 PROCEDURE — 86901 BLOOD TYPING SEROLOGIC RH(D): CPT

## 2022-06-19 PROCEDURE — 85610 PROTHROMBIN TIME: CPT

## 2022-06-19 PROCEDURE — 2580000003 HC RX 258: Performed by: STUDENT IN AN ORGANIZED HEALTH CARE EDUCATION/TRAINING PROGRAM

## 2022-06-19 PROCEDURE — 85730 THROMBOPLASTIN TIME PARTIAL: CPT

## 2022-06-19 PROCEDURE — 85384 FIBRINOGEN ACTIVITY: CPT

## 2022-06-19 PROCEDURE — 6360000002 HC RX W HCPCS: Performed by: STUDENT IN AN ORGANIZED HEALTH CARE EDUCATION/TRAINING PROGRAM

## 2022-06-19 PROCEDURE — 85025 COMPLETE CBC W/AUTO DIFF WBC: CPT

## 2022-06-19 PROCEDURE — 86780 TREPONEMA PALLIDUM: CPT

## 2022-06-19 RX ORDER — SODIUM CHLORIDE 9 MG/ML
INJECTION, SOLUTION INTRAVENOUS CONTINUOUS
Status: DISCONTINUED | OUTPATIENT
Start: 2022-06-19 | End: 2022-06-21

## 2022-06-19 RX ORDER — SODIUM CHLORIDE 0.9 % (FLUSH) 0.9 %
5-40 SYRINGE (ML) INJECTION PRN
Status: DISCONTINUED | OUTPATIENT
Start: 2022-06-19 | End: 2022-06-21

## 2022-06-19 RX ORDER — SODIUM CHLORIDE, SODIUM LACTATE, POTASSIUM CHLORIDE, AND CALCIUM CHLORIDE .6; .31; .03; .02 G/100ML; G/100ML; G/100ML; G/100ML
500 INJECTION, SOLUTION INTRAVENOUS PRN
Status: DISCONTINUED | OUTPATIENT
Start: 2022-06-19 | End: 2022-06-21

## 2022-06-19 RX ORDER — ONDANSETRON 2 MG/ML
4 INJECTION INTRAMUSCULAR; INTRAVENOUS EVERY 6 HOURS PRN
Status: DISCONTINUED | OUTPATIENT
Start: 2022-06-19 | End: 2022-06-21

## 2022-06-19 RX ORDER — DIPHENHYDRAMINE HCL 25 MG
25 TABLET ORAL EVERY 4 HOURS PRN
Status: DISCONTINUED | OUTPATIENT
Start: 2022-06-19 | End: 2022-06-21

## 2022-06-19 RX ORDER — SODIUM CHLORIDE 0.9 % (FLUSH) 0.9 %
5-40 SYRINGE (ML) INJECTION EVERY 12 HOURS SCHEDULED
Status: DISCONTINUED | OUTPATIENT
Start: 2022-06-19 | End: 2022-06-21

## 2022-06-19 RX ORDER — ACETAMINOPHEN 500 MG
1000 TABLET ORAL EVERY 6 HOURS PRN
Status: DISCONTINUED | OUTPATIENT
Start: 2022-06-19 | End: 2022-06-21

## 2022-06-19 RX ORDER — SODIUM CHLORIDE, SODIUM LACTATE, POTASSIUM CHLORIDE, AND CALCIUM CHLORIDE .6; .31; .03; .02 G/100ML; G/100ML; G/100ML; G/100ML
1000 INJECTION, SOLUTION INTRAVENOUS PRN
Status: DISCONTINUED | OUTPATIENT
Start: 2022-06-19 | End: 2022-06-21

## 2022-06-19 RX ORDER — SODIUM CHLORIDE 9 MG/ML
25 INJECTION, SOLUTION INTRAVENOUS PRN
Status: DISCONTINUED | OUTPATIENT
Start: 2022-06-19 | End: 2022-06-21

## 2022-06-19 RX ADMIN — DEXTROSE MONOHYDRATE 5 MILLION UNITS: 5 INJECTION INTRAVENOUS at 22:07

## 2022-06-19 RX ADMIN — SODIUM CHLORIDE: 9 INJECTION, SOLUTION INTRAVENOUS at 21:30

## 2022-06-19 RX ADMIN — DINOPROSTONE 10 MG: 10 INSERT VAGINAL at 22:03

## 2022-06-19 NOTE — LETTER
Yvon Li accompanied Ant Cardenas to Labor and Delivery on 6/19/2022-6/22/2022. If you have any questions or concerns, please don't hesitate to call.       Ricardo Dennis RN

## 2022-06-20 ENCOUNTER — TELEPHONE (OUTPATIENT)
Dept: ONCOLOGY | Age: 22
End: 2022-06-20

## 2022-06-20 LAB
AMPHETAMINE SCREEN URINE: NEGATIVE
BARBITURATE SCREEN URINE: NEGATIVE
BENZODIAZEPINE SCREEN, URINE: NEGATIVE
CANNABINOID SCREEN URINE: NEGATIVE
COCAINE METABOLITE, URINE: NEGATIVE
CREATININE URINE: 178.9 MG/DL (ref 28–217)
CULTURE: NORMAL
FIBRINOGEN: 473 MG/DL (ref 140–420)
GLUCOSE BLD-MCNC: 89 MG/DL (ref 65–105)
GLUCOSE BLD-MCNC: 94 MG/DL (ref 65–105)
INR BLD: 0.9
METHADONE SCREEN, URINE: NEGATIVE
OPIATES, URINE: NEGATIVE
OXYCODONE SCREEN URINE: NEGATIVE
PARTIAL THROMBOPLASTIN TIME: 21.4 SEC (ref 20.5–30.5)
PHENCYCLIDINE, URINE: NEGATIVE
PROTHROMBIN TIME: 9.7 SEC (ref 9.1–12.3)
SPECIMEN DESCRIPTION: NORMAL
TEST INFORMATION: NORMAL
TOTAL PROTEIN, URINE: 53 MG/DL
URINE TOTAL PROTEIN CREATININE RATIO: 0.3 (ref 0–0.2)

## 2022-06-20 PROCEDURE — 2580000003 HC RX 258: Performed by: STUDENT IN AN ORGANIZED HEALTH CARE EDUCATION/TRAINING PROGRAM

## 2022-06-20 PROCEDURE — 80307 DRUG TEST PRSMV CHEM ANLYZR: CPT

## 2022-06-20 PROCEDURE — 99024 POSTOP FOLLOW-UP VISIT: CPT | Performed by: OBSTETRICS & GYNECOLOGY

## 2022-06-20 PROCEDURE — 96372 THER/PROPH/DIAG INJ SC/IM: CPT

## 2022-06-20 PROCEDURE — 84156 ASSAY OF PROTEIN URINE: CPT

## 2022-06-20 PROCEDURE — 6360000002 HC RX W HCPCS: Performed by: STUDENT IN AN ORGANIZED HEALTH CARE EDUCATION/TRAINING PROGRAM

## 2022-06-20 PROCEDURE — 82947 ASSAY GLUCOSE BLOOD QUANT: CPT

## 2022-06-20 PROCEDURE — 6370000000 HC RX 637 (ALT 250 FOR IP): Performed by: STUDENT IN AN ORGANIZED HEALTH CARE EDUCATION/TRAINING PROGRAM

## 2022-06-20 PROCEDURE — 3E033VJ INTRODUCTION OF OTHER HORMONE INTO PERIPHERAL VEIN, PERCUTANEOUS APPROACH: ICD-10-PCS | Performed by: OBSTETRICS & GYNECOLOGY

## 2022-06-20 PROCEDURE — 1220000000 HC SEMI PRIVATE OB R&B

## 2022-06-20 PROCEDURE — 82570 ASSAY OF URINE CREATININE: CPT

## 2022-06-20 RX ORDER — MORPHINE SULFATE 10 MG/ML
10 INJECTION, SOLUTION INTRAMUSCULAR; INTRAVENOUS ONCE
Status: COMPLETED | OUTPATIENT
Start: 2022-06-20 | End: 2022-06-20

## 2022-06-20 RX ORDER — PROCHLORPERAZINE EDISYLATE 5 MG/ML
10 INJECTION INTRAMUSCULAR; INTRAVENOUS ONCE
Status: COMPLETED | OUTPATIENT
Start: 2022-06-20 | End: 2022-06-20

## 2022-06-20 RX ADMIN — ACETAMINOPHEN 1000 MG: 500 TABLET ORAL at 16:00

## 2022-06-20 RX ADMIN — PROCHLORPERAZINE EDISYLATE 10 MG: 5 INJECTION INTRAMUSCULAR; INTRAVENOUS at 18:02

## 2022-06-20 RX ADMIN — Medication 1 MILLI-UNITS/MIN: at 18:38

## 2022-06-20 RX ADMIN — Medication 2.5 MILLION UNITS: at 14:50

## 2022-06-20 RX ADMIN — Medication 2.5 MILLION UNITS: at 18:06

## 2022-06-20 RX ADMIN — Medication 2.5 MILLION UNITS: at 03:58

## 2022-06-20 RX ADMIN — MORPHINE SULFATE 10 MG: 10 INJECTION INTRAVENOUS at 18:03

## 2022-06-20 RX ADMIN — SODIUM CHLORIDE: 9 INJECTION, SOLUTION INTRAVENOUS at 22:49

## 2022-06-20 RX ADMIN — Medication 25 MCG: at 12:40

## 2022-06-20 RX ADMIN — Medication 2.5 MILLION UNITS: at 09:50

## 2022-06-20 RX ADMIN — SODIUM CHLORIDE: 9 INJECTION, SOLUTION INTRAVENOUS at 01:44

## 2022-06-20 RX ADMIN — Medication 2.5 MILLION UNITS: at 22:02

## 2022-06-20 ASSESSMENT — PAIN DESCRIPTION - LOCATION: LOCATION: ABDOMEN;BACK

## 2022-06-20 ASSESSMENT — PAIN DESCRIPTION - DESCRIPTORS: DESCRIPTORS: ACHING

## 2022-06-20 ASSESSMENT — PAIN SCALES - GENERAL
PAINLEVEL_OUTOF10: 9
PAINLEVEL_OUTOF10: 10

## 2022-06-20 NOTE — PROGRESS NOTES
OB/GYN Resident Interval Note    Patient SVE unchanged after Cervidil, closed/thick/-2. Will plan for Cytotec 25mcg PO after shower. Continue to monitor closely.     Remigio Robb,   OB/GYN Resident

## 2022-06-20 NOTE — H&P
[address] [facility] [place]    Date: 2022       Time: 9:25 PM   Patient Name: Usama Nettles     Patient : 2000  Room/Bed: 0706/0706-01    Admission Date/Time: 2022  8:43 PM    CC: Induction of labor      HPI: Usama Nettles is a 24 y.o.  at 37w4d who presents for scheduled induction of labor secondary to fetal growth restriction (AC <3 %tile) and coexisting maternal hypertension. The patient reports fetal movement is present, denies contractions but states she has some mild abdominal cramping, denies loss of fluid, denies vaginal bleeding. Patient denies headache, vision changes, nausea, vomiting, fever, chills, shortness of breath, chest pain, RUQ pain, diarrhea, change in color/amount/odor of vaginal discharge, dysuria or, hematuria. DATING:  LMP: Patient's last menstrual period was 10/10/2021 (approximate).   Estimated Date of Delivery: 22   Based on: US, at 15 6/7 weeks GA    PREGNANCY RISK FACTORS:  Patient Active Problem List   Diagnosis    GERD    Orthostatic Hypotension     Martha-Parkinson-White    Anemia    Elv Early 1 hr GTT    Late prenatal care    GBS Positive    Fetal Choroid plexus cyst    cHTN (no meds)    Gestational diabetes    IUGR (intrauterine growth restriction) 2022    Iron deficiency anemia due to chronic blood loss    37 weeks gestation of pregnancy      Steroids Given In This Pregnancy:  no     REVIEW OF SYSTEMS:   Constitutional: negative fever, negative chills, negative weight changes   HEENT: negative visual disturbances, negative headaches, negative dizziness, negative hearing loss  Breast: Negative breast abnormalities, negative breast lumps, negative nipple discharge  Respiratory: negative dyspnea, negative cough, negative SOB  Cardiovascular: negative chest pain,  negative palpitations, negative arrhythmia, negative syncope   Gastrointestinal: positive abdominal cramping, negative RUQ pain, negative N/V, negative diarrhea, negative constipation, negative bowel changes, negative heartburn   Genitourinary: negative dysuria, negative hematuria, negative urinary incontinence, negative vaginal discharge, negative= vaginal bleeding or spotting  Dermatological: negative rash, negative pruritis, negative mole or other skin changes  Hematologic: negative bruising  Immunologic/Lymphatic: negative recent illness, negative recent sick contact  Musculoskeletal: negative back pain, negative myalgias, negative arthralgias  Neurological:  negative dizziness, negative migraines, negative seizures, negative weakness  Behavior/Psych: negative depression, negative anxiety, negative SI, negative HI    OBSTETRICAL HISTORY:   OB History    Para Term  AB Living   1 0 0 0 0 0   SAB IAB Ectopic Molar Multiple Live Births   0 0 0 0 0 0      # Outcome Date GA Lbr Antony/2nd Weight Sex Delivery Anes PTL Lv   1 Current                PAST MEDICAL HISTORY:   has a past medical history of Dizziness and Martha-Parkinson-White pattern. PAST SURGICAL HISTORY:   has a past surgical history that includes ablation of dysrhythmic focus. ALLERGIES:  has No Known Allergies. MEDICATIONS:  Prior to Admission medications    Medication Sig Start Date End Date Taking?  Authorizing Provider   Prenatal Vit-Fe Fumarate-FA (PRENATAL VITAMINS) 28-0.8 MG TABS Take 1 tablet by mouth daily 22  CHANCE Savage CNP   Blood Glucose Monitoring Suppl (ONE TOUCH ULTRA 2) w/Device KIT  22   Historical Provider, MD   ONETOUCH ULTRA strip  22   Historical Provider, MD   Lancets ([address]) [facility]  22   Historical Provider, MD   ferrous sulfate dried (SLOW RELEASE IRON) 160 (50 Fe) MG TBCR extended release tablet Take 160 mg by mouth 2 times daily 3/15/22 5/27/23  CHANCE Savage CNP   Pyridoxine HCl (B-6) 50 MG TABS Take 1 tablet by mouth nightly  Patient not taking: Reported on 2022 12/30/21   Wyline Buerger, MD       FAMILY HISTORY:  family history includes Diabetes in her father; Hypertension in her father and mother. SOCIAL HISTORY:   reports that she has never smoked. She has never used smokeless tobacco. She reports that she does not drink alcohol and does not use drugs.     VITALS:  Vitals:    06/19/22 2102   BP: 113/64   Pulse: (!) 107   Resp: 16   Temp: 98.3 °F (36.8 °C)   TempSrc: Oral   SpO2: 97%   Weight: 140 lb (63.5 kg)   Height: 5' 4\" (1.626 m)     PHYSICAL EXAM:  Fetal Heart Monitor:  Baseline Heart Rate 140, moderate variability, present accelerations, absent decelerations  [place]: contractions, none    General appearance:  no apparent distress, alert, and cooperative  HEENT: head atraumatic, normocephalic, moist mucous membranes, trachea midline  Neurologic:  alert, oriented, normal speech, no focal findings or movement disorder noted  Lungs:  No increased work of breathing noted  Heart:  regular rate and rhythm and no murmur    Abdomen:  soft, gravid, and non-tender  Extremities:  no calf tenderness, non edematous   Musculoskeletal: Gross strength equal and intact throughout, no gross abnormalities, range of motion normal in hips, knees, shoulders and spine  Psychiatric: Mood appropriate, normal affect   Rectal Exam: not indicated  Pelvic Exam:   Chaperone for Intimate Exam: Chaperone was present for entire exam, Chaperone Name: Bertin Cam, RN  Sterile Vaginal Exam:  Cervix: No cervical motion tenderness   Uterus: Is gravid, Normal size, shape, consistency and non-tender  Cervix: Closed dilated, 0 % effaced, -3 station, posterior position (out of 3 station), firm consistency, FETAL POSITION: Cephalic (confirmed by ultrasound), Membranes intact,    Bishops Score: 0     0 1 2 3   Position Posterior Intermediate Anterior -   Consistency Firm Intermediate Soft -   Effacement 0-30% 31-50% 51-80% >80%   Dilation 0cm 1-2cm 3-4cm >5cm   Fetal Station -3 -2 -1, 0 +1, +2      LIMITED BEDSIDE US:  Position: Cephalic  Placental Location: anterior  Fetal Heart Tones: Present  Fetal Movement: Present  Amniotic Fluid Index/Volume: adequate 2x2 cm fluid pocket  Estimated Fetal Weight:  5 lbs 13 oz on MFM US on 22    PRENATAL LAB RESULTS:   Blood Type/Rh: A pos  Antibody Screen: negative  Hemoglobin, Hematocrit, Platelets: Hgb 7.3/SFF 37.2/Plt 405  Rubella: immune  T. Pallidum, IgG: non-reactive  Hepatitis B Surface Antigen: non-reactive   Hepatitis C Antibody: not done   HIV: non-reactive   Sickle Cell Screen: negative  Gonorrhea: negative  Chlamydia: negative  Urine culture: negative, date: 2/10/22    Early 1 hour Glucose Tolerance Test: 134  3 hour Glucose Tolerance Test: Fastin; 1 hour: 178; 2 hour  184; 3 hour: 140    Group B Strep: positive  RV culture on 22  Cystic Fibrosis Screen: negative  First Trimester Screen: not done  MSAFP: negative  Non-Invasive Prenatal Testing: low risk for aneuploidy  Anatomy US: anterior placenta, 3VC, male gender, unilateral right sided choroid plexus cysts    ASSESSMENT & PLAN:  Merline Keel is a 24 y.o. female  at 37w4d IUP   - GBS positive / Rh positive / R immune   - Pen G for GBS prophylaxis     IOL 2/2 cHTN (no meds) & FGR (AC<3%ile)    - Admit to labor and delivery under the service of Dr. Tate Jaeger   - VSS    - cEFM and TOCO   - Cat 1 FHT and TOCO showing no contractions   - CBC, T&S, T.Pal ordered   - PreE labs ordered on admission   - Denies s/s PreE   - UDS ordered. R/B/A discussed with patient and patient agreeable   - IVF: NS @ 125 cc/hr   - Plan of Induction: Cervidil      GDMA1    - Patient follows with M   - Reports fasting sugars within the 80s - 90s and 2 hr PP glucose 90s- low 130s   - Will check fasting and 2 hr PP blood sugars while inpatient until active labor (q1-2 hour sugar checks at that time)    WPW    - Patient is s/p ablation in    - Patient states that she last saw cardiology in middle school.  Last documented note was from 2018   - Clinically asymptomatic and does not take any medications   - Fetal echo wnl on 3/30/22      Fetal Choroid plexus cyst (rslvd)    - Seen on MFM anatomy scan on 2/23/22   - Not seen on MFM US on 3/30/22   - NIPT wnl      Anemia    - Most recent Hgb was 8.1 on 5/7/22   - Iron studies were ordered but not completed by patient   - Clinically asymptomatic   - CBC ordered on admission      Late prenatal care    - OB intake was 15w6d    GERD       BMI 24  Patient Active Problem List    Diagnosis Date Noted    Gestational diabetes 05/09/2022     Priority: High     5/9/2022 consult MFM      IUGR (intrauterine growth restriction) 5/4/2022 05/09/2022     Priority: High    GBS Positive 02/14/2022     Priority: High     Treat in labor per protocol      Anemia 02/11/2022     Priority: High     2/11/2022 ferrous sulfate 325mg PO BID      Elv Early 1 hr GTT 02/11/2022     Priority: High     2/11/2022 3 hour GTT and Hgb a1c      Late prenatal care 02/11/2022     Priority: High    37 weeks gestation of pregnancy 06/19/2022     Priority: Medium    Iron deficiency anemia due to chronic blood loss 05/27/2022     Priority: Medium    Fetal Choroid plexus cyst 02/23/2022     Noted on anatomy ultrasound 2/23/22  Resolved by 3/30/22      cHTN (no meds) 02/23/2022     Diagnosed off two elevated blood pressures: one in 2018 and one in 2019      Martha-Parkinson-White 12/09/2016     s/p ablation   Fetal echo: wnl      Orthostatic Hypotension      GERD 11/25/2014     Plan discussed with Dr. Casey Christensen, who is agreeable. Steroids given this admission: No    Risks, benefits, alternatives and possible complications have been discussed in detail with the patient. Admission, and post admission procedures and expectations were discussed in detail. All questions were answered.     Attending's Name: Dr. Radha Chowdary MD  Ob/Gyn Resident  6/19/2022, 9:25 PM

## 2022-06-20 NOTE — FLOWSHEET NOTE
Morales balloon placed and inflated with 60 ml.'s saline with speculum,stylet and ring forceps after several attempts. Patient tolerated well.

## 2022-06-20 NOTE — PROGRESS NOTES
Labor Progress Note    Deo Pope is a 24 y.o. female  at 37w6d  The patient was seen and examined. Noel balloon placed and inflated w/ 60 cc sterile saline. Her pain is well controlled. She reports fetal movement is present, complains of contractions, denies loss of fluid, denies vaginal bleeding.        Vital Signs:  Vitals:    22 2102 22 0720 22 1245   BP: 113/64 100/82 112/69   Pulse: (!) 107 77 84   Resp: 16 16 20   Temp: 98.3 °F (36.8 °C) 98.8 °F (37.1 °C) 97.9 °F (36.6 °C)   TempSrc: Oral Oral    SpO2: 97% 98%    Weight: 140 lb (63.5 kg)     Height: 5' 4\" (1.626 m)             FHT: 140, moderate variability, accelerations present, decelerations absent  Contractions: none    Chaperone for Intimate Exam: Chaperone was present for entire exam, Chaperone Name: [facility], RN  Cervical Exam: 1/thick/high  Pitocin: @ 0 mu/min    Membranes: Intact  Scalp Electrode in place: absent  Intrauterine Pressure Catheter in Place: absent    Interventions: noel balloon placed w/ 60 cc sterile saline    Assessment/Plan:  Deo Pope is a 24 y.o. female  at 37w6d admitted for IOL 2/2 cHTN w/ CRISTOFER w/o SF (no meds) and FGR   - GBS positive, Pen G for GBS prophylaxis   - Cervidil x 1, cytotec 25 PO x 1   - noel balloon placed and filled w/ 60 cc sterile saline, patient tolerated well   - Pitocin ordered to start 4 hours after last cytotec    - PreE labs wnl, P/C 0.3        Attending updated and in agreement with plan    Barbara Palumbo DO  Ob/Gyn Resident  2022, 3:26 PM

## 2022-06-20 NOTE — DISCHARGE SUMMARY
Obstetric Discharge Summary  [facility] 2    Patient Name: Ant Cardenas  Patient : 2000  Primary Care Physician: No primary care provider on file. Admit Date: 2022    Principal Diagnosis: IUP at 37w4d, admitted for IOL 2/2 cHTN and Fetal Growth Restriction     Her pregnancy has been complicated by:   Patient Active Problem List   Diagnosis    GERD    Orthostatic Hypotension     Martha-Parkinson-White    Anemia    Elv Early 1 hr GTT    Late prenatal care    GBS Positive    Fetal Choroid plexus cyst    cHTN (no meds) w/ CRISTOFER w/o SF    Gestational diabetes    IUGR (intrauterine growth restriction) 2022    Iron deficiency anemia due to chronic blood loss    37 weeks gestation of pregnancy     22 M Apg 8/9 Wt 5#12     Infection Present?: No  Hospital Acquired: No    Surgical Operations & Procedures:  Analgesia: none  Delivery Type: Spontaneous Vaginal Delivery: See Labor and Delivery Summary   Laceration(s): right periuretheral, first degree perineal repaired w/ 3-0 vicryl    Consultations: none    Pertinent Findings & Procedures:   Ant Cardenas is a 24 y.o. female  at 37w1d admitted for IOL 2/2 chronic HTN and fetal growth restriction, PreE labs on admission wnl, P/C 0.30 therefore will consider cHTN (no meds) w/ CRISTOFER w/o SF. For induction she received Pen G, Cervidil x1, Cytotec 25 PO x1, noel balloon, pitocin, morphine/compazine x3, SROM, Nubain x1. She delivered by induced vaginal a Live Born infant on 22. Information for the patient's :  Bushra Score [[id number]]   male   Birth Weight: 5 lb 12.8 oz (2.63 kg)     Apgars: 8 at 1 minute and 9 at 5 minutes. Postpartum course: normal.    POD#1 Hgb was 8.0. Patient noted to have a low grade fever of 100.4. CBC showed WBC of 18.3 and lactic acid was 0.7.   She was started on PO iron supplementation on discharge     Course of patient: uncomplicated    Discharge to: Home    Readmission planned: no     Recommendations on Discharge:     Medications:      Medication List      START taking these medications    docusate sodium 100 MG capsule  Commonly known as: COLACE  Take 1 capsule by mouth 2 times daily     ferrous sulfate 325 (65 Fe) MG EC tablet  Commonly known as: Fe Tabs  Take 1 tablet by mouth 2 times daily     ibuprofen 600 MG tablet  Commonly known as: ADVIL;MOTRIN  Take 1 tablet by mouth every 6 hours as needed for Pain        CONTINUE taking these medications    B-6 50 MG Tabs  Take 1 tablet by mouth nightly     ONE TOUCH ULTRA 2 w/Device Kit     OneTouch Delica Plus [id number] Misc     Prenatal Vitamins 28-0.8 MG Tabs  Take 1 tablet by mouth daily     Slow Release Iron 160 (50 Fe) MG Tbcr extended release tablet  Generic drug: ferrous sulfate dried  Take 160 mg by mouth 2 times daily        STOP taking these medications    OneTouch Ultra strip  Generic drug: blood glucose test strips           Where to Get Your Medications      These medications were sent to [address], [address]  [address]    Phone: [phone]   · docusate sodium 100 MG capsule  · ferrous sulfate 325 (65 Fe) MG EC tablet  · ibuprofen 600 MG tablet         Activity: pelvic rest x 6 weeks  Diet: regular diet  Follow up: 1 week for BP check    Condition on discharge: stable    Discharge date: 6/22/22    Matty Bravo MD  Ob/Gyn Resident    Comments:  Home care and follow-up care were reviewed. Pelvic rest, and birth control were reviewed. Signs and symptoms of mastitis and post partum depression were reviewed. The patient is to notify her physician if any of these occur. The patient was counseled on secondary smoke risks and the increased risk of sudden infant death syndrome and respiratory problems to her baby with exposure.  She was counseled on various alternate recommendations to decrease the exposure to secondary smoke to her children.

## 2022-06-20 NOTE — CARE COORDINATION
ANTEPARTUM NOTE    37 weeks gestation of pregnancy [Z3A.37]    Mary Hurtado was admitted to L&D on 6/19/22 for IOL @ 37w4d. OB GYN Provider: Dr. Manuel Goetz    Will meet with patient after delivery to verify name/address/phone/insurance and discuss discharge planning. Anticipate DC home 2 nights after vaginal delivery or 4 nights after C/S delivery as long as hemodynamically stable.

## 2022-06-20 NOTE — TELEPHONE ENCOUNTER
Pt called to let me know she is being induced and is unable to make it to get her infusion tomorrow. Pt said she is going to call once she was home and ready to reschedule.       Electronically signed by Sarah Herbert on 6/20/2022 at 1:24 PM

## 2022-06-20 NOTE — PROGRESS NOTES
Patient Name: Bryce Rodriguez  Patient : 2000  Room/Bed: [facility]  Admission Date/Time: 2022  8:43 PM  MRN #: [id number]  [facility] #: [id number]    Date: 2022  Time: 1:09 PM        Bryce Rodriguez is a 24 y.o. female  OB History    Para Term  AB Living   1 0 0 0 0 0   SAB IAB Ectopic Molar Multiple Live Births   0 0 0 0 0 0      # Outcome Date GA Lbr Antony/2nd Weight Sex Delivery Anes PTL Lv   1 Current                Gestational Age:  37w6d      The patient was seen and examined. The details of her pelvic exam can be found in the EPIC flow section of her EMR. Her pain  is well controlled. The baby is moving well. Tracing Review (Date of Tracing): There Is moderate Variability  Vitals:    22 2102 22 0720 22 1245   BP: 113/64 100/82 112/69   Pulse: (!) 107 77 84   Resp: 16 16 20   Temp: 98.3 °F (36.8 °C) 98.8 °F (37.1 °C) 97.9 °F (36.6 °C)   TempSrc: Oral Oral    SpO2: 97% 98%    Weight: 140 lb (63.5 kg)     Height: 5' 4\" (1.626 m)       FHT's are 130  The tracing is Category 1 .     Intervention:  None      Membranes Are: Intact  Scalp Electrode in place: No  Intrauterine Pressure Catheter in Place: No          4 Week Lab Review:  Admission on 2022   Component Date Value Ref Range Status    Expiration Date 2022,2359   Final    Arm Band Number 2022 [id number]   Final    ABO/Rh 2022 A POSITIVE   Final    Antibody Screen 2022 NEGATIVE   Final    Amphetamine Screen, Ur 2022 NEGATIVE  NEGATIVE Final    Comment:       (Positive cutoff 1000 ng/mL)                  Barbiturate Screen, Ur 2022 NEGATIVE  NEGATIVE Final    Comment:       (Positive cutoff 200 ng/mL)                  Benzodiazepine Screen, Urine 2022 NEGATIVE  NEGATIVE Final    Comment:       (Positive cutoff 200 ng/mL)                  Cocaine Metabolite, Urine 2022 NEGATIVE  NEGATIVE Final    Comment:       (Positive cutoff 300 ng/mL) Segs Absolute 06/19/2022 7.12  1.8 - 7.7 k/uL Final    Absolute Lymph # 06/19/2022 2.18  1.0 - 4.8 k/uL Final    Atypical Lymphocytes Absolute 06/19/2022 0.20  k/uL Final    Absolute Mono # 06/19/2022 0.40  0.1 - 1.4 k/uL Final    Absolute Eos # 06/19/2022 0.00  0.0 - 0.4 k/uL Final    Basophils Absolute 06/19/2022 0.00  0.0 - 0.2 k/uL Final    Morphology 06/19/2022 MICROCYTOSIS PRESENT   Final    Morphology 06/19/2022 ANISOCYTOSIS PRESENT   Final    Morphology 06/19/2022 1+ POLYCHROMASIA   Final    Morphology 06/19/2022 1+ SCHISTOCYTES   Final    Glucose 06/19/2022 92  70 - 99 mg/dL Final    BUN 06/19/2022 8  6 - 20 mg/dL Final    CREATININE 06/19/2022 0.41* 0.50 - 0.90 mg/dL Final    Calcium 06/19/2022 9.1  8.6 - 10.4 mg/dL Final    Sodium 06/19/2022 135  135 - 144 mmol/L Final    Potassium 06/19/2022 3.7  3.7 - 5.3 mmol/L Final    Chloride 06/19/2022 101  98 - 107 mmol/L Final    CO2 06/19/2022 21  20 - 31 mmol/L Final    Anion Gap 06/19/2022 13  9 - 17 mmol/L Final    Alkaline Phosphatase 06/19/2022 133* 35 - 104 U/L Final    ALT 06/19/2022 8  5 - 33 U/L Final    AST 06/19/2022 18  <32 U/L Final    Total Bilirubin 06/19/2022 0.33  0.3 - 1.2 mg/dL Final    Total Protein 06/19/2022 7.2  6.4 - 8.3 g/dL Final    Albumin 06/19/2022 3.9  3.5 - 5.2 g/dL Final    Albumin/Globulin Ratio 06/19/2022 1.2  1.0 - 2.5 Final    GFR Non- 06/19/2022 >60  >60 mL/min Final    GFR  06/19/2022 >60  >60 mL/min Final    GFR Comment 06/19/2022        Final    Comment: Average GFR for 20-28 years old:   80 mL/min/1.73sq m  Chronic Kidney Disease:   <60 mL/min/1.73sq m  Kidney failure:   <15 mL/min/1.73sq m              eGFR calculated using average adult body mass. Additional eGFR calculator available at:        OpenText.br            Total Protein, Urine 06/20/2022 53  mg/dL Final    No normal range established.     Creatinine, Ur 06/20/2022 178.9  28.0 - 217.0 mg/dL Final    Urine Total Protein Creatinine Rat* 06/20/2022 0.30* 0.00 - 0.20 Final    Protime 06/19/2022 9.7  9.1 - 12.3 sec Final    INR 06/19/2022 0.9   Final    Comment:       Therapeutic Range: Moderate Anticoagulant Intensity:     INR = 2.0-3.0   High Anticoagulant Intensity:     INR = 2.5-3.5            PTT 06/19/2022 21.4  20.5 - 30.5 sec Final    Comment:       IV Heparin Therapy Range:  48.6-77.8      Fibrinogen 06/19/2022 473* 140 - 420 mg/dL Final    POC Glucose 06/20/2022 89  65 - 105 mg/dL Final    POC Glucose 06/20/2022 94  65 - 105 mg/dL Final   Hospital Outpatient Visit on 06/17/2022   Component Date Value Ref Range Status    Specimen Description 06/17/2022 . VAGINA   Preliminary    Culture 06/17/2022 CULTURE IN PROGRESS   Preliminary   Admission on 06/02/2022, Discharged on 06/02/2022   Component Date Value Ref Range Status    POC Glucose 06/02/2022 84  65 - 105 mg/dL Final   Hospital Outpatient Visit on 05/27/2022   Component Date Value Ref Range Status    Ferritin 05/27/2022 18  13 - 150 ng/mL Final    Hgb Electrophoresis Interp 05/27/2022 NORMAL HB ELECTROPHORESIS PATTERN. Final    Comment: HBA=97.2%  HBA2=2.8%        Normal Values:      Hemoglobin A:   96.5-98.5%      Hemoglobin A2:  1.5-3.5%            Pathologist 05/27/2022 ELECTRONICALLY SIGNED. Larissa Razo M.D.    Final    Haptoglobin 05/27/2022 134  30 - 200 mg/dL Final    Retic % 05/27/2022 2.9* 0.5 - 2.0 % Final    Absolute Retic # 05/27/2022 0.122* 0.0245 - 0.098 M/uL Final    Iron 05/27/2022 37  37 - 145 ug/dL Final    TIBC 05/27/2022 565* 250 - 450 ug/dL Final    Iron Saturation 05/27/2022 7* 20 - 55 % Final    UIBC 05/27/2022 528* 112 - 347 ug/dL Final   ]    /69   Pulse 84   Temp 97.9 °F (36.6 °C)   Resp 20   Ht 5' 4\" (1.626 m)   Wt 140 lb (63.5 kg)   LMP 10/10/2021 (Approximate)   SpO2 98%   BMI 24.03 kg/m²       Pelvic Exam:  Cervix Check:     DILATION:  1 cm   EFFACEMENT:   50%   STATION:  -1 cm   CONSISTENCY:  firm   POSITION:  mid    FETAL POSITION: Cephalic    Assessment:  1Yolette Rodriguez is a 24 y.o. female  37w5d     2.   OB History    Para Term  AB Living   1             SAB IAB Ectopic Molar Multiple Live Births                    # Outcome Date GA Lbr Antony/2nd Weight Sex Delivery Anes PTL Lv   1 Current                  3. 37 weeks gestation of pregnancy [Z3A.37]      4. Patient Active Problem List    Diagnosis Date Noted    Gestational diabetes 2022     Priority: High     2022 consult MFM      IUGR (intrauterine growth restriction) 2022     Priority: High    cHTN (no meds) 2022     Priority: High     Diagnosed off two elevated blood pressures: one in  and one in 2019      GBS Positive 2022     Priority: High     Treat in labor per protocol      Anemia 2022     Priority: High     2022 ferrous sulfate 325mg PO BID      Elv Early 1 hr GTT 2022     Priority: High     2022 3 hour GTT and Hgb a1c      Late prenatal care 2022     Priority: High    37 weeks gestation of pregnancy 2022     Priority: Medium    Iron deficiency anemia due to chronic blood loss 2022     Priority: Medium    Fetal Choroid plexus cyst 2022     Noted on anatomy ultrasound 22  Resolved by 3/30/22      Martha-Parkinson-White 2016     s/p ablation   Fetal echo: wnl      Orthostatic Hypotension      GERD 2014       5. IOL d/t FGR w/ CHTN. MFM recommendation for delivery          Plan:   1. Continue with current care plan  2. Cytotec.  Morales balloon when able        Electronically signed by Reuben Grayson DO on 2022 at 1:09 PM

## 2022-06-21 ENCOUNTER — HOSPITAL ENCOUNTER (OUTPATIENT)
Dept: INFUSION THERAPY | Age: 22
Setting detail: INFUSION SERIES
Discharge: HOME | End: 2022-06-21

## 2022-06-21 LAB
GLUCOSE BLD-MCNC: 104 MG/DL (ref 65–105)
GLUCOSE BLD-MCNC: 97 MG/DL (ref 65–105)
T. PALLIDUM, IGG: NONREACTIVE

## 2022-06-21 PROCEDURE — 1220000000 HC SEMI PRIVATE OB R&B

## 2022-06-21 PROCEDURE — 96374 THER/PROPH/DIAG INJ IV PUSH: CPT

## 2022-06-21 PROCEDURE — 6360000002 HC RX W HCPCS

## 2022-06-21 PROCEDURE — 6370000000 HC RX 637 (ALT 250 FOR IP): Performed by: STUDENT IN AN ORGANIZED HEALTH CARE EDUCATION/TRAINING PROGRAM

## 2022-06-21 PROCEDURE — 82947 ASSAY GLUCOSE BLOOD QUANT: CPT

## 2022-06-21 PROCEDURE — 0HQ9XZZ REPAIR PERINEUM SKIN, EXTERNAL APPROACH: ICD-10-PCS | Performed by: SPECIALIST

## 2022-06-21 PROCEDURE — 6360000002 HC RX W HCPCS: Performed by: STUDENT IN AN ORGANIZED HEALTH CARE EDUCATION/TRAINING PROGRAM

## 2022-06-21 PROCEDURE — 2580000003 HC RX 258: Performed by: STUDENT IN AN ORGANIZED HEALTH CARE EDUCATION/TRAINING PROGRAM

## 2022-06-21 PROCEDURE — 2500000003 HC RX 250 WO HCPCS

## 2022-06-21 PROCEDURE — 59414 DELIVER PLACENTA: CPT | Performed by: SPECIALIST

## 2022-06-21 PROCEDURE — 7200000001 HC VAGINAL DELIVERY

## 2022-06-21 PROCEDURE — 96375 TX/PRO/DX INJ NEW DRUG ADDON: CPT

## 2022-06-21 PROCEDURE — 96372 THER/PROPH/DIAG INJ SC/IM: CPT

## 2022-06-21 PROCEDURE — 0UQMXZZ REPAIR VULVA, EXTERNAL APPROACH: ICD-10-PCS | Performed by: SPECIALIST

## 2022-06-21 PROCEDURE — 6370000000 HC RX 637 (ALT 250 FOR IP)

## 2022-06-21 RX ORDER — LIDOCAINE HYDROCHLORIDE 10 MG/ML
INJECTION, SOLUTION INFILTRATION; PERINEURAL
Status: DISCONTINUED
Start: 2022-06-21 | End: 2022-06-21

## 2022-06-21 RX ORDER — MORPHINE SULFATE 10 MG/ML
10 INJECTION, SOLUTION INTRAMUSCULAR; INTRAVENOUS ONCE
Status: COMPLETED | OUTPATIENT
Start: 2022-06-21 | End: 2022-06-21

## 2022-06-21 RX ORDER — SODIUM CHLORIDE 0.9 % (FLUSH) 0.9 %
5-40 SYRINGE (ML) INJECTION PRN
Status: DISCONTINUED | OUTPATIENT
Start: 2022-06-21 | End: 2022-06-22 | Stop reason: HOSPADM

## 2022-06-21 RX ORDER — SODIUM CHLORIDE 0.9 % (FLUSH) 0.9 %
5-40 SYRINGE (ML) INJECTION EVERY 12 HOURS SCHEDULED
Status: DISCONTINUED | OUTPATIENT
Start: 2022-06-21 | End: 2022-06-22 | Stop reason: HOSPADM

## 2022-06-21 RX ORDER — NALBUPHINE HCL 10 MG/ML
10 AMPUL (ML) INJECTION ONCE
Status: CANCELLED | OUTPATIENT
Start: 2022-06-21

## 2022-06-21 RX ORDER — SIMETHICONE 80 MG
80 TABLET,CHEWABLE ORAL EVERY 6 HOURS PRN
Status: DISCONTINUED | OUTPATIENT
Start: 2022-06-21 | End: 2022-06-22 | Stop reason: HOSPADM

## 2022-06-21 RX ORDER — SODIUM CHLORIDE 9 MG/ML
INJECTION, SOLUTION INTRAVENOUS PRN
Status: DISCONTINUED | OUTPATIENT
Start: 2022-06-21 | End: 2022-06-22 | Stop reason: HOSPADM

## 2022-06-21 RX ORDER — CALCIUM CARBONATE 200(500)MG
500 TABLET,CHEWABLE ORAL 3 TIMES DAILY PRN
Status: DISCONTINUED | OUTPATIENT
Start: 2022-06-21 | End: 2022-06-21

## 2022-06-21 RX ORDER — HYDROCORTISONE 25 MG/G
CREAM TOPICAL
Status: DISCONTINUED | OUTPATIENT
Start: 2022-06-21 | End: 2022-06-22 | Stop reason: HOSPADM

## 2022-06-21 RX ORDER — NALBUPHINE HCL 10 MG/ML
10 AMPUL (ML) INJECTION
Status: DISCONTINUED | OUTPATIENT
Start: 2022-06-21 | End: 2022-06-21

## 2022-06-21 RX ORDER — PROCHLORPERAZINE EDISYLATE 5 MG/ML
10 INJECTION INTRAMUSCULAR; INTRAVENOUS ONCE
Status: COMPLETED | OUTPATIENT
Start: 2022-06-21 | End: 2022-06-21

## 2022-06-21 RX ORDER — ONDANSETRON 4 MG/1
4 TABLET, ORALLY DISINTEGRATING ORAL EVERY 4 HOURS PRN
Status: DISCONTINUED | OUTPATIENT
Start: 2022-06-21 | End: 2022-06-22 | Stop reason: HOSPADM

## 2022-06-21 RX ORDER — IBUPROFEN 600 MG/1
600 TABLET ORAL EVERY 6 HOURS PRN
Status: DISCONTINUED | OUTPATIENT
Start: 2022-06-21 | End: 2022-06-22 | Stop reason: HOSPADM

## 2022-06-21 RX ORDER — DOCUSATE SODIUM 100 MG/1
100 CAPSULE, LIQUID FILLED ORAL 2 TIMES DAILY
Qty: 60 CAPSULE | Refills: 1 | Status: SHIPPED | OUTPATIENT
Start: 2022-06-21 | End: 2022-07-21

## 2022-06-21 RX ORDER — ACETAMINOPHEN 500 MG
1000 TABLET ORAL EVERY 8 HOURS PRN
Status: DISCONTINUED | OUTPATIENT
Start: 2022-06-21 | End: 2022-06-22 | Stop reason: HOSPADM

## 2022-06-21 RX ORDER — METHYLERGONOVINE MALEATE 0.2 MG/ML
200 INJECTION INTRAVENOUS ONCE
Status: COMPLETED | OUTPATIENT
Start: 2022-06-21 | End: 2022-06-21

## 2022-06-21 RX ORDER — DOCUSATE SODIUM 100 MG/1
100 CAPSULE, LIQUID FILLED ORAL 2 TIMES DAILY
Status: DISCONTINUED | OUTPATIENT
Start: 2022-06-21 | End: 2022-06-22 | Stop reason: HOSPADM

## 2022-06-21 RX ORDER — LANOLIN ALCOHOL/MO/W.PET/CERES
325 CREAM (GRAM) TOPICAL 2 TIMES DAILY
Qty: 90 TABLET | Refills: 3 | Status: SHIPPED | OUTPATIENT
Start: 2022-06-21

## 2022-06-21 RX ORDER — LANOLIN 72 %
OINTMENT (GRAM) TOPICAL PRN
Status: DISCONTINUED | OUTPATIENT
Start: 2022-06-21 | End: 2022-06-22 | Stop reason: HOSPADM

## 2022-06-21 RX ORDER — NALBUPHINE HCL 10 MG/ML
AMPUL (ML) INJECTION
Status: COMPLETED
Start: 2022-06-21 | End: 2022-06-21

## 2022-06-21 RX ORDER — IBUPROFEN 600 MG/1
600 TABLET ORAL EVERY 6 HOURS PRN
Qty: 40 TABLET | Refills: 1 | Status: SHIPPED | OUTPATIENT
Start: 2022-06-21

## 2022-06-21 RX ADMIN — Medication 2.5 MILLION UNITS: at 10:01

## 2022-06-21 RX ADMIN — ACETAMINOPHEN 1000 MG: 500 TABLET ORAL at 19:58

## 2022-06-21 RX ADMIN — PROCHLORPERAZINE EDISYLATE 10 MG: 5 INJECTION INTRAMUSCULAR; INTRAVENOUS at 02:35

## 2022-06-21 RX ADMIN — DOCUSATE SODIUM 100 MG: 100 CAPSULE ORAL at 19:58

## 2022-06-21 RX ADMIN — SODIUM CHLORIDE: 9 INJECTION, SOLUTION INTRAVENOUS at 07:27

## 2022-06-21 RX ADMIN — LIDOCAINE HYDROCHLORIDE: 10 INJECTION, SOLUTION INFILTRATION; PERINEURAL at 14:55

## 2022-06-21 RX ADMIN — Medication 10 MG: at 09:12

## 2022-06-21 RX ADMIN — MORPHINE SULFATE 10 MG: 10 INJECTION INTRAVENOUS at 02:35

## 2022-06-21 RX ADMIN — MORPHINE SULFATE 10 MG: 10 INJECTION INTRAVENOUS at 13:31

## 2022-06-21 RX ADMIN — Medication 2.5 MILLION UNITS: at 06:02

## 2022-06-21 RX ADMIN — METHYLERGONOVINE MALEATE 200 MCG: 0.2 INJECTION, SOLUTION INTRAMUSCULAR; INTRAVENOUS at 14:55

## 2022-06-21 RX ADMIN — Medication 2.5 MILLION UNITS: at 14:06

## 2022-06-21 RX ADMIN — Medication 166.7 ML: at 14:53

## 2022-06-21 RX ADMIN — PROCHLORPERAZINE EDISYLATE 10 MG: 5 INJECTION INTRAMUSCULAR; INTRAVENOUS at 13:31

## 2022-06-21 RX ADMIN — NALBUPHINE HYDROCHLORIDE 10 MG: 10 INJECTION, SOLUTION INTRAMUSCULAR; INTRAVENOUS; SUBCUTANEOUS at 09:12

## 2022-06-21 RX ADMIN — Medication 2.5 MILLION UNITS: at 02:16

## 2022-06-21 RX ADMIN — Medication 87.3 MILLI-UNITS/MIN: at 15:04

## 2022-06-21 RX ADMIN — ONDANSETRON 4 MG: 2 INJECTION, SOLUTION INTRAMUSCULAR; INTRAVENOUS at 03:15

## 2022-06-21 RX ADMIN — ANTACID TABLETS 500 MG: 500 TABLET, CHEWABLE ORAL at 10:01

## 2022-06-21 ASSESSMENT — PAIN DESCRIPTION - LOCATION: LOCATION: PERINEUM

## 2022-06-21 ASSESSMENT — PAIN SCALES - GENERAL
PAINLEVEL_OUTOF10: 8
PAINLEVEL_OUTOF10: 10
PAINLEVEL_OUTOF10: 0
PAINLEVEL_OUTOF10: 10

## 2022-06-21 ASSESSMENT — PAIN DESCRIPTION - ORIENTATION: ORIENTATION: LOWER

## 2022-06-21 ASSESSMENT — PAIN - FUNCTIONAL ASSESSMENT: PAIN_FUNCTIONAL_ASSESSMENT: ACTIVITIES ARE NOT PREVENTED

## 2022-06-21 NOTE — PROGRESS NOTES
Labor Progress Note    Kia Gómez is a 24 y.o. female  at 37w6d  The patient was seen and examined. Her pain is well controlled. She reports fetal movement is present, complains of contractions, denies loss of fluid, denies vaginal bleeding. Gentle traction on noel balloon however it remains in place at this time.        Vital Signs:  Vitals:    22 2101 22 2200 22 2250 22 2300   BP: 107/89 120/63  114/78   Pulse: 91 77  99   Resp: 20 20  16   Temp:   98.6 °F (37 °C)    TempSrc:   Oral    SpO2:    99%   Weight:       Height:            FHT: 140, moderate variability, accelerations present, decelerations absent  Contractions: irregular, every 2-4 minutes    Cervical Exam: deferred  Pitocin: @ 6 mu/min    Membranes: Intact  Scalp Electrode in place: absent  Intrauterine Pressure Catheter in Place: absent    Interventions: Gentle traction on noel balloon, remains in place    Assessment/Plan:  Kia Gómez is a 24 y.o. female  at 37w6d admitted for IOL 2/2 cHTN with CRISTOFER w/o SF and Fetal Growth Restriction   - GBS positive, Pen G for GBS prophylaxis   - S/p cervidil x1   - S/p cytotec 25mcg PO x1   - Noel balloon remains in place with gentle traction, out @0300   - Continue low dose pitocin   - S/p morphine/compazine x1    cHTN w/ CRISTOFER w/o SF   - PreE labs on admission wnl, P/C 0.30   - Denies current s/s preE   - BP normotensive throughout admission   - Not currently on medication    GDMA1   - Continue FBS and 2hr PP blood sugars      Attending updated and in agreement with plan    Alba Harmon DO  Ob/Gyn Resident  2022, 11:57 PM

## 2022-06-21 NOTE — PROGRESS NOTES
Labor Progress Note    Mery Becerril is a 24 y.o. female  at 41w10d  The patient was seen and examined. Her pain is not well controlled without medication and she is requesting additional medication. She reports fetal movement is present, complains of contractions, complains of loss of fluid, denies vaginal bleeding. Vital Signs:  Vitals:    22 1134 22 1200 22 1259 22 1300   BP: 125/78  126/80 126/70   Pulse: 80  84 74   Resp: 16  14 16   Temp:  98.7 °F (37.1 °C)     TempSrc:       SpO2:       Weight:       Height:             FHT: Baseline 130, moderate variability, accelerations present, decelerations absent, Category 1 tracing  Contractions: irregular, every 1-3 minutes  Cervical Exam: 2 cm dilated, 90 effaced, 0 station  Chaperone for Intimate Exam: Chaperone was present for entire exam, Chaperone Name: Kaye Horton RN  Pitocin: @ 4 mu/min    Membranes: Ruptured clear fluid  Scalp Electrode in place: absent  Intrauterine Pressure Catheter in Place: absent        Assessment/Plan:  Mery Becerril is a 24 y.o. female  at 41w10d admitted for IUP   - GBS positive, Pen G for GBS prophylaxis   - Rh pos, Workup for Rhogam not indicated PP   - Anterior placenta, 3 vc, normal male anatomy    IOL 2/2 cHTN w/ CRISTOFER w/o SF and FGR (AC <3%ile)   - PreE labs wnl, P/C 0.3 with unknown baseline    - BP normotensive    - Denies s/sx PreE   - VSS, afebrile   - Cat 1 FHT and TOCO showing irregular contractions   - Membranes SROM (clr) @ 0821   - SVE 2/90%/0   - cEFM and TOCO   - NS IVF @ 125 mL/hr   - S/p Cytptec 24 mcg PO x1   - S/p Cervidil x1   - S/p Nubain x1   - S/p Morphine/compazine x2   - S/p Morales transcervical balloon   - Continue pitocin per protocol   - Morphine/compazine ordered   - Epidural ordered when desired      GDMA1   - Continue POCT glucose [id number]   - POCT glucose q1-2hr in active labor     - NS IVF   - CC CLD     Discussed with Senior Resident.       Jenny Pepper, DO  Ob/Gyn Resident  Pager: [phone] [facility]   6/21/20221:19 PM

## 2022-06-21 NOTE — FLOWSHEET NOTE
RN at bedside, pt in bathroom stating she feels like she constantly has to pee and is not sure if morphine/phen is helping with pain or not. Pt would like to remain in bathroom for a few more minutes.

## 2022-06-21 NOTE — PROGRESS NOTES
OBGYN Labor Progress Note    Boris Cruz is a 24 y.o. female  at 41w10d  The patient was seen and examined. Her pain is well controlled with nubain. She reports fetal movement is present, complains of contractions, complains of loss of fluid, denies vaginal bleeding.        Vital Signs:  Vitals:    22 1134   BP: 125/78   Pulse: 80   Resp: 16   Temp:    SpO2:          FHT: 130, moderate variability, accelerations present, decelerations absent  Contractions: regular, every 1-3 minutes    Examination chaperoned by Emory Valenzuela RN    Cervical Exam: 2 cm dilated, 90% effaced, -1 station  Pitocin: @ 1 mu/min    Membranes: Ruptured clear fluid  Scalp Electrode in place: absent  Intrauterine Pressure Catheter in Place: absent    Interventions: none    Assessment/Plan:  Boris Cruz is a 24 y.o. female  at 37w6d IUP   - GBS positive / Rh positive / R immune   - Pen G for GBS prophylaxis    IOL 2/2 cHTN (no meds) with CRISTOFER without SF and FGR (AC<3%)   - VSS   - cEFM and TOCO   - IVF:  mL/hr   - S/p Cervidil x1   - S/p Cytotec 25mcg PO x1   - S/p Morales balloon   - S/p Morphine/compazine x2   - Pitocin (break at [id number])   - SROM clear fluid at 0815   - S/p Nubain x1   - Continue expectant management      Senior resident updated and in agreement with plan    Blaise Arzate DO  OBPASCUAL Resident  2022, 12:21 PM

## 2022-06-21 NOTE — FLOWSHEET NOTE
Pt states her pain has not been relieved by nubain. Request for check before deciding on epidural.  Dr. Yin Malhotra updated and agreeable.    Pitocin restarted at 1

## 2022-06-21 NOTE — PROGRESS NOTES
Labor Progress Note    Yusuf Greco is a 24 y.o. female  at 41w10d  The patient was seen and examined. SROM clear fluid when she returned from bathroom to bed. Her pain is not well controlled. She reports fetal movement is present, complains of contractions, complains of loss of fluid, denies vaginal bleeding.        Vital Signs:  Vitals:    22 0603 22 0700 22 0713 22 0821   BP: (!) 93/56 (!) 102/44 (!) 98/44 118/73   Pulse: 84 89 85 77   Resp: 16 14  14   Temp: 98.1 °F (36.7 °C) 98.2 °F (36.8 °C)     TempSrc: Oral Oral     SpO2: 98% 98%     Weight:       Height:             FHT: 130, moderate variability, accelerations present, decelerations absent  Contractions: regular, every 1 minutes    Chaperone for Intimate Exam: Chaperone was present for entire exam, Chaperone Name: Eloise Espino RN  Cervical Exam: 2 cm dilated, 90 effaced, -1 station  Pitocin: @ 5  mu/min, turned down to 2 due to tachy systole    Membranes: Ruptured clear fluid  Scalp Electrode in place: absent  Intrauterine Pressure Catheter in Place: absent    Interventions: SVE, pitocin halved    Assessment/Plan:  Yusuf Greco is a 24 y.o. female  at 41w10d admitted for IOL 2/2 cHTN w/ CRISTOFER w/o SF (no meds) & FGR (AC<3rd percentile)   - GBS positive, Pen G for GBS prophylaxis   - VSS, afebrile   - cEFM/TOCO   - Nubain ordered   - SROM clear fluid   - S/P noel balloon   - S/p cytotec 25 PO x1   - S/P cervidil x 1   - continue pitocin per protocol   - s/p morphine/compazine x 2   - Continue to monitor       Attending updated and in agreement with plan    Araceli Vergara DO  Ob/Gyn Resident  2022, 9:00 AM

## 2022-06-21 NOTE — PROGRESS NOTES
Labor Progress Note    Scott Polo is a 24 y.o. female  at 41w10d  The patient was seen and examined. Her pain is well controlled. She reports fetal movement is present, complains of contractions, denies loss of fluid, denies vaginal bleeding.        Vital Signs:  Vitals:    22 0234 22 0313 22 0329 22 0603   BP:   107/75 (!) 93/56   Pulse:   81 84   Resp:   14    Temp: 99.3 °F (37.4 °C) 99.4 °F (37.4 °C)     TempSrc: Oral Oral     SpO2:       Weight:       Height:         FHT: 140, moderate variability, accelerations present, decelerations absent  Contractions: irregular, every 2-3 minutes    Chaperone for Intimate Exam: Chaperone was present for entire exam, Chaperone Name: Yelena Escamilla RN  Cervical Exam: 2 cm dilated, 90 effaced, -1 station  Pitocin: @ 5 mu/min    Membranes: Intact  Scalp Electrode in place: absent  Intrauterine Pressure Catheter in Place: absent    Interventions: SVE    Assessment/Plan:  Scott Polo is a 24 y.o. female  at 41w10d admitted for IOL 2/2 cHTN w/ CRISTOFER w/o SF (no meds) & FGR (AC<3%ile)    - GBS positive, No indication for GBS prophylaxis   - VSS, afebrile   - cEFM/TOCO    - S/p Morales balloon    - S/p Cytotec 25 PO x1    - S/p Cervidil x1    - Continue pitocin per protocol   - S/p Morphine/compazine x2    - Continue to monitor closely     Attending updated and in agreement with plan    Randa Baumgarten, MD  Ob/Gyn Resident  2022, 6:10 AM

## 2022-06-21 NOTE — FLOWSHEET NOTE
Patient to room 738 per w/c, baby in moms arms and family at side and supportive. Plan of care discussed questions and concerns addressed.

## 2022-06-21 NOTE — PROGRESS NOTES
Labor Progress Note    Michelle Choe is a 24 y.o. female  at 41w10d  The patient was seen and examined. Her pain is well controlled. She reports fetal movement is present, complains of contractions, denies loss of fluid, denies vaginal bleeding.        Vital Signs:  Vitals:    22 0100 22 0216 22 0234 22 0313   BP: 136/70 118/88     Pulse: (!) 103 82     Resp: 16 16     Temp:   99.3 °F (37.4 °C) 99.4 °F (37.4 °C)   TempSrc:   Oral Oral   SpO2:       Weight:       Height:         FHT: 145, moderate variability, accelerations present, decelerations absent  Contractions: irregular, every 1-3 minutes    Chaperone for Intimate Exam: Chaperone was present for entire exam, Chaperone Name: Dorcas Mcadams RN  Cervical Exam: 2 cm dilated, 80 effaced, -1 station  Pitocin: @ 6 mu/min    Membranes: Intact  Scalp Electrode in place: absent  Intrauterine Pressure Catheter in Place: absent    Interventions: SVE, gentle traction on noel balloon, deflation of noel balloon    Assessment/Plan:  Michelle Choe is a 24 y.o. female  at 41w10d admitted for IOL 2/2 cHTN w/ CRISTOFER w/o SF (no meds) & FGR (AC<3%ile)    - GBS positive, Pen G for GBS prophylaxis   - VSS, afebrile   - cEFM/TOCO   - S/p Noel balloon    - S/p Cytotec 25 PO x1    - S/p Cervidil x1    - Continue pitocin per protocol   - S/p Morphine/compazine x2    - Continue to monitor closely    Attending updated and in agreement with plan    Cora Llanes MD  Ob/Gyn Resident  2022, 3:15 AM

## 2022-06-21 NOTE — PROGRESS NOTES
Labor Progress Note    Scott Polo is a 24 y.o. female  at 37w6d  The patient was seen and examined. Her pain is well controlled. She reports fetal movement is present, complains of contractions, denies loss of fluid, denies vaginal bleeding.        Vital Signs:  Vitals:    22 1602 22 1803 22 1903 22 2101   BP: 117/87 115/75 120/74 107/89   Pulse: 72 76 78 91   Resp:    Temp: 98.8 °F (37.1 °C)      TempSrc:       SpO2:       Weight:       Height:         FHT: 140, moderate variability, accelerations present, decelerations absent  Contractions: irregular, every 2-3 minutes    Chaperone for Intimate Exam:NA  Cervical Exam: deferred  Pitocin: @ 2 mu/min    Membranes: Intact  Scalp Electrode in place: absent  Intrauterine Pressure Catheter in Place: absent    Interventions: none    Assessment/Plan:  Scott Polo is a 24 y.o. female  at 37w6d admitted for IOL 2/2 cHTN w/ CRISTOFER w/o SF (no meds) & FGR (AC<3%ile)    - GBS positive, Pen G for GBS prophylaxis   - VSS, afebrile   - cEFM/TOCO   - S/p Cytotec 25 PO x1    - S/p Cervidil x1    - Morales balloon in place, out@ 0300    - Continue pitocin per protocol   - S/p Morphine/compazine x1    - Continue to monitor closely    Senior resident updated and in agreement with plan    Randa Baumgarten, MD  Ob/Gyn Resident  2022, 9:12 PM

## 2022-06-21 NOTE — L&D DELIVERY NOTE
Mother's Information    Labor Events     Labor?: No  Cervical Ripening:   Now         Catie, Baby Boy Latisha [[id number]]    Labor Events     Labor?: No   Steroids?: None  Cervical Ripening Date/Time:     Antibiotics Received during Labor?: Yes  Rupture Identifier: Sac 1   Rupture Date/Time: 22 08:21:00   Rupture Type: SROM  Fluid Color: Clear  Fluid Odor: None  Fluid Volume:  Moderate  Induction: Oxytocin, Cervidil, Morales Bulb (Balloon)  Augmentation: Oxytocin     Anesthesia    Method: None     Start Pushing    Labor onset date/time:   Now   Dilation complete date/time:   Now   Start pushing date/time:    Decision date/time (emergent ):       Delivery ()    Delivery Date/Time:  22 14:48:00   Delivery Type: Vaginal, Spontaneous    Details:        [place] Presentation    Presentation: Vertex  Position: Left  _: Occiput  _: Anterior     Shoulder Dystocia    Shoulder Dystocia Present?: No  Add Second Maneuver  Add Third Maneuver  Add Fourth Maneuver  Add Fifth Maneuver  Add Sixth Maneuver  Add Seventh Maneuver  Add Eighth Maneuver  Add Ninth Maneuver     Assisted Delivery Details    Forceps Attempted?: No  Vacuum Extractor Attempted?: No     Document Additional Attempt       Document Additional Attempt             Cord       Placenta    Date/Time: 2022 14:53:45  Removal: Spontaneous  Appearance: Intact  Disposition: Lab, Pathology     Lacerations    Episiotomy: None  Perineal Lacerations: None  Other Lacerations: periurethral laceration  Periurethral Laceration: Bilateral       Vaginal Counts    Initial Count Personnel: Zaheer Ravi RN  Initial Count Verified By: Shelia Man RN    Sponges [place] [employer]   Initial Counts Correct Correct Correct   Final Counts Correct Correct Correct   Final Count Personnel: Zaheer Ravi RN  Final Count Verified By: Harshal Santana  Accurate Final Count?: Yes  If the count is incorrect due to Intentionally Retained Foreign Object (IRFO) add the IRFO LDA in Lines/Drains. Add LDA: Link to [facility]     Blood Loss  Mother: Vivek Padilla #[id number]   Start of Mother's Information    Delivery Blood Loss  22 0248 - 22 1509    None           End of Mother's Information  Mother: Vivek Padilla #[id number]          Delivery Providers    Delivering clinician: Jose Perry MD     Provider Role    Gerri Vinson MD Obstetrician    Amairani Barlow RN Primary Nurse    Rigoberto Benítez, RN Primary [place] Nurse     NICU Nurse     Neonatologist     Anesthesiologist     Nurse Anesthetist     Nurse Practitioner     Midwife     Nursery Nurse          [place] Assessment    Living Status: Living  Delivery Location Comment: 706     Apgar Scoring Key:    0 1 2    Skin Color: Blue or pale Acrocyanotic Completely pink    Heart Rate: Absent <100 bpm >100 bpm    Reflex Irritability: No response Grimace Cry or active withdrawal    Muscle Tone: Limp Some flexion Active motion    Respiratory Effort: Absent Weak cry; hypoventilation Good, crying                  Skin Color:   Heart Rate:   Reflex Irritability:   Muscle Tone:   Respiratory Effort:    Total:            1 Minute:    0    2    2    2    2    8        Apgar 1 total from OB History    5 Minute:    1    2    2    2    2    9        Apgar 5 total from OB History    10 Minute:              15 Minute:              20 Minute:                      Apgars Assigned Ben Salvador RN          Resuscitation    Method: Bulb Suction, Stimulation, Room Air           [place] Measurements           Title    Skin to Skin Initiation Date/Time:     Skin to Skin End Date/Time:              Vaginal Delivery Note  Department of Obstetrics and Gynecology  [address] [facility]       Patient: [place] Delay   : 2000  MRN: [id number]   Date of delivery: 22     Pre-operative Diagnosis: [place] Delay  at [address] [facility] 2/2 cHTN w/ CRISTOFER w/o SF & FGR (AC<3%ile)    GDMA1    Anemia     Late prenatal care    BMI 24    Post-operative Diagnosis: Same as above and  living infant male    Delivering Obstetrician & Assistant(s): Dr. Tucker Regalado, Dr. López Garcia; DO Dr. Guerrero Salas PGY2    Infant Information:   Information for the patient's :  Willie Clark [[id number]]        Information for the patient's :  Willie Clark [[id number]]          Apgar scores: 8 at 1 minute and 9 at 5 minutes. Anesthesia:  none    Application and Delivery:    She was known to be GBS positive and received antibiotic prophylaxis. The patient progressed well and complained of feeling more pressure 1 hour after being 2 cm. She as found to be complete +2 station. She felt the urge to push. After pushing with contractions the fetal head delivered Cephalic, left occiput anterior over an intact perineum, nuchal cord was not present. The anterior, then posterior shoulder delivered easily and atraumatically followed by the rest of the infant. Nose and mouth suctioned with bulb suction, infant was stimulated and dried. Cord was clamped and cut after one minute delayed cord clamping and infant was placed on maternal abdomen, and attended by RN for evaluation. The delivery of the placenta was spontaneous and appeared intact, whole and that the umbilical cord had three vessels noted. Methergine 0.2 mg IM was given due to brisk bleeding which quickly resolved. Pitocin was started. The vagina was swept of all clots and debris. The perineum and vagina were evaluated and a right periuretheral and first degree perineal laceration were found and repaired in standard fashion with 3-0 vicryl. Mother and baby tolerated procedure well.      Dr. López Garcia was present for delivery of the baby, Dr. Tucker Regalado arrived during the delivery of the placenta due to precipitous delivery     Delivery Summary:       Specimen: placenta sent to pathology, cord blood and cord gases  Quantitative blood loss:  400ml immediately following delivery  Condition:  infant stable to general nursery and mother stable  Counts: instrument and sponge counts correct  Blood Type and Rh: A POSITIVE    Rubella Immunity Status: immune  Infant Feeding: both breast and bottle     Azam Chau DO  Ob/Gyn Resident  6/21/2022, 3:09 PM

## 2022-06-22 VITALS
OXYGEN SATURATION: 98 % | SYSTOLIC BLOOD PRESSURE: 96 MMHG | HEIGHT: 64 IN | TEMPERATURE: 98.1 F | DIASTOLIC BLOOD PRESSURE: 63 MMHG | HEART RATE: 77 BPM | WEIGHT: 140 LBS | BODY MASS INDEX: 23.9 KG/M2 | RESPIRATION RATE: 16 BRPM

## 2022-06-22 LAB
HCT VFR BLD CALC: 27.2 % (ref 36.3–47.1)
HEMOGLOBIN: 8 G/DL (ref 11.9–15.1)
LACTIC ACID, WHOLE BLOOD: 0.7 MMOL/L (ref 0.7–2.1)
MCH RBC QN AUTO: 22.3 PG (ref 25.2–33.5)
MCHC RBC AUTO-ENTMCNC: 29.4 G/DL (ref 28.4–34.8)
MCV RBC AUTO: 75.8 FL (ref 82.6–102.9)
NRBC AUTOMATED: 0 PER 100 WBC
PDW BLD-RTO: 20.7 % (ref 11.8–14.4)
PLATELET # BLD: 322 K/UL (ref 138–453)
PMV BLD AUTO: 10.3 FL (ref 8.1–13.5)
RBC # BLD: 3.59 M/UL (ref 3.95–5.11)
WBC # BLD: 18.3 K/UL (ref 4.5–13.5)

## 2022-06-22 PROCEDURE — 99232 SBSQ HOSP IP/OBS MODERATE 35: CPT | Performed by: SPECIALIST

## 2022-06-22 PROCEDURE — 99024 POSTOP FOLLOW-UP VISIT: CPT | Performed by: OBSTETRICS & GYNECOLOGY

## 2022-06-22 PROCEDURE — 2580000003 HC RX 258: Performed by: STUDENT IN AN ORGANIZED HEALTH CARE EDUCATION/TRAINING PROGRAM

## 2022-06-22 PROCEDURE — 6370000000 HC RX 637 (ALT 250 FOR IP): Performed by: STUDENT IN AN ORGANIZED HEALTH CARE EDUCATION/TRAINING PROGRAM

## 2022-06-22 PROCEDURE — 85027 COMPLETE CBC AUTOMATED: CPT

## 2022-06-22 PROCEDURE — 83605 ASSAY OF LACTIC ACID: CPT

## 2022-06-22 PROCEDURE — 36415 COLL VENOUS BLD VENIPUNCTURE: CPT

## 2022-06-22 RX ADMIN — DOCUSATE SODIUM 100 MG: 100 CAPSULE ORAL at 08:40

## 2022-06-22 RX ADMIN — SODIUM CHLORIDE, PRESERVATIVE FREE 10 ML: 5 INJECTION INTRAVENOUS at 08:40

## 2022-06-22 NOTE — PLAN OF CARE
Problem: Chronic Conditions and Co-morbidities  Goal: Patient's chronic conditions and co-morbidity symptoms are monitored and maintained or improved  Outcome: Progressing     Problem: Pain  Goal: Verbalizes/displays adequate comfort level or baseline comfort level  Outcome: Progressing     Problem: Postpartum  Goal: Experiences normal postpartum course  Description:  Postpartum OB-Pregnancy care plan goal which identifies if the mother is experiencing a normal postpartum course  Outcome: Progressing  Goal: Appropriate maternal -  bonding  Description:  Postpartum OB-Pregnancy care plan goal which identifies if the mother and  are bonding appropriately  Outcome: Progressing  Goal: Establishment of infant feeding pattern  Description:  Postpartum OB-Pregnancy care plan goal which identifies if the mother is establishing a feeding pattern with their   Outcome: Progressing  Goal: Incisions, wounds, or drain sites healing without S/S of infection  Outcome: Progressing     Problem: Safety - Adult  Goal: Free from fall injury  Outcome: Progressing     Problem: Discharge Planning  Goal: Discharge to home or other facility with appropriate resources  Outcome: Progressing

## 2022-06-22 NOTE — CARE COORDINATION
CASE MANAGEMENT POST-PARTUM TRANSITIONAL CARE PLAN    37 weeks gestation of pregnancy [Z3A.37]    OB Provider: Dr Carmen Forrester met w/ Darlyn Gregory at bedside to discuss DCP. She is S/P  on 2022    Writer verified name/address/phone number correct on facesheet. She states she lives with FOB Joanne Maddox and their pup Harrington [place]. Latisha verbalized no problems with transportation to and from doctors appointments or with paying for medications upon discharge home. [employer] correct. Writer notified Darlyn Gregory she has 30 days from date of birth to add  to insurance policy. Latisha verbalized understanding. Latisha confirmed a safe place for infant to sleep at home. Infant name on BC: Luis Cates. Elisa Scott PCP [facility] office.      DME: no but has a glucometer at home from pregnancy  HOME CARE: no    Anticipate DC of couplet 2022    Readmission Risk              Risk of Unplanned Readmission:  7

## 2022-06-22 NOTE — LACTATION NOTE
Mom states she is only formula feeding at this time, encouraged to reach out for assistance if needed.

## 2022-06-22 NOTE — PROGRESS NOTES
CLINICAL PHARMACY NOTE: MEDS TO BEDS    Total # of Prescriptions Filled: 3   The following medications were delivered to the patient:  · Ferrous sulfate 325mg tablets  · Docusate 100mg capsules  · Motrin 600mg tablets    Additional Documentation: medications delivered to the pt in room 738 on 06.22.22 at 09:30, 1 visitor in the room at the time of delivery.  No co pay

## 2022-06-22 NOTE — LACTATION NOTE
Attempted baby on breast using a 16 mm flange and a syringe with formula. Baby latched and fed on and off for 5 minutes.  Encouraged parents to give a bottle after attempt at breast.

## 2022-06-22 NOTE — FLOWSHEET NOTE
Discharge instructions given to patient PBW signs reviewed with patient. Patient has no further questions. Patient wheeled out in wheelchair.

## 2022-06-22 NOTE — PROGRESS NOTES
POST PARTUM DAY # 1    Giancarlo Osborn is a 24 y.o. female  This patient was seen & examined today. Her pregnancy was complicated by:   Patient Active Problem List   Diagnosis    GERD    Orthostatic Hypotension     Martha-Parkinson-White    Anemia    Elv Early 1 hr GTT    Late prenatal care    GBS Positive    Fetal Choroid plexus cyst    cHTN (no meds) w/ CRISTOFER w/o SF    Gestational diabetes    IUGR (intrauterine growth restriction) 2022    Iron deficiency anemia due to chronic blood loss    37 weeks gestation of pregnancy     22 M Apg 8/9 Wt 5#12     Today she is doing well without any chief complaint. Her lochia is light. She denies chest pain, shortness of breath, headache, lightheadedness, blurred vision, peripheral edema, palpitations, dry cough and fatigue. She is both bottle and breast feeding and she denies any breast tenderness. She is ambulating well. Her voiding pattern is normal. I reviewed signs and symptoms of post partum depression with the patient, she currently denies any of these symptoms. She is tolerating solids.      Vital Signs:  Vitals:    22 1657 22 1710 22 1958 22 2215   BP: 112/75 122/80 121/80    Pulse: 91 73 (!) 104    Resp: 16 16 17    Temp:  98.7 °F (37.1 °C) 100.4 °F (38 °C) 98.7 °F (37.1 °C)   TempSrc:  Oral Oral Oral   SpO2: 98% 97% 97%    Weight:       Height:         Physical Exam:  General:  no apparent distress, alert and cooperative  Neurologic:  alert, oriented, normal speech, no focal findings or movement disorder noted  Lungs:  No increased work of breathing, good air exchange, clear to auscultation bilaterally, no crackles or wheezing  Heart:  regular rate and rhythm    Abdomen: abdomen soft, non-distended, non-tender  Fundus: non-tender, normal size, firm, below umbilicus  Extremities:  no calf tenderness, non edematous    Lab:  Lab Results   Component Value Date    HGB 8.9 (L) 2022     Lab Results   Component Value Date HCT 29.6 (L) 2022     Assessment/Plan:  1. Bryce Rodriguez is a  PPD # 1 s/p    - Doing well, VSS   - male infant in [address], circumcision not desired   - Encourage ambulation   - Postpartum Hgb/Hct awaiting  2. Rh positive/Rubella immune   - Rhogam/MMR not indicated at this time  3. both breast and bottle    - Denies s/s of mastitis  4. Fever   - First temperature (100.4) noted at [address] on    - Patient denies any subjective fevers/chills   - No fundal tenderness or foul smelling discharge present   - Patient denies any s/s mastitis or any urinary symptoms   - CBC and lactic acid pending   - Low grade fever is common within first 24 hours of delivery. Will continue to monitor closely and observe for any other fevers > 24 hours of delivery  5. Anemia   - Hgb on admission was 8.9   - PPD#1 Hgb pending   - Clinically asymptomatic   - Iron printed at discharge   - Monitor closely  6. BMI 24   - Ambulation encouraged   7. Continue post partum care    Counseling Completed:  Secondary Smoke risks and Sudden Infant Death Syndrome were reviewed with recommendations. Infant sleeping, \"back to sleep\" and avoidance of co-sleeping recommendations were reviewed. Signs and Symptoms of Post Partum Depression were reviewed. The patient is to call if any occur. Signs and symptoms of Mastitis were reviewed. The patient is to call if any occur for follow up. Discharge instructions including pelvic rest, no driving with pain medicine and office follow-up were reviewed with patient     Attending Physician: Dr. Leatha Gross MD  Ob/Gyn Resident   2022, 4:12 AM  I have discussed the care of Bryce Rodriguez, including pertinent history and exam findings, with the resident. I have seen and examined the patient and the key elements of all parts of the encounter have been performed by me. I agree with the assessment, plan and orders as documented by the resident.     Janelle Essex, MD  Attending Physician

## 2022-06-22 NOTE — LACTATION NOTE
Attempted baby on the left breast in football hold. Some latching with few sucks noted. Encouraged her to hold him skin to skin and call out when baby alerts for a feeding. Packet of breastfeeding information given.

## 2022-07-06 ENCOUNTER — OFFICE VISIT (OUTPATIENT)
Dept: OBGYN CLINIC | Age: 22
End: 2022-07-06
Payer: COMMERCIAL

## 2022-07-06 ENCOUNTER — HOSPITAL ENCOUNTER (OUTPATIENT)
Age: 22
Discharge: HOME | End: 2022-07-06
Payer: COMMERCIAL

## 2022-07-06 ENCOUNTER — TELEPHONE (OUTPATIENT)
Dept: OBGYN CLINIC | Age: 22
End: 2022-07-06

## 2022-07-06 VITALS
WEIGHT: 127 LBS | DIASTOLIC BLOOD PRESSURE: 52 MMHG | SYSTOLIC BLOOD PRESSURE: 94 MMHG | BODY MASS INDEX: 21.68 KG/M2 | HEIGHT: 64 IN

## 2022-07-06 DIAGNOSIS — D64.9 ANEMIA, UNSPECIFIED TYPE: ICD-10-CM

## 2022-07-06 LAB
HCT VFR BLD CALC: 30.7 % (ref 36–46)
HEMOGLOBIN: 9.6 G/DL (ref 12–16)

## 2022-07-06 PROCEDURE — 99213 OFFICE O/P EST LOW 20 MIN: CPT | Performed by: NURSE PRACTITIONER

## 2022-07-06 PROCEDURE — 36415 COLL VENOUS BLD VENIPUNCTURE: CPT

## 2022-07-06 PROCEDURE — 85018 HEMOGLOBIN: CPT

## 2022-07-06 PROCEDURE — 85014 HEMATOCRIT: CPT

## 2022-07-06 RX ORDER — FERROUS SULFATE 325(65) MG
325 TABLET ORAL 2 TIMES DAILY
Qty: 60 TABLET | Refills: 1 | Status: SHIPPED | OUTPATIENT
Start: 2022-07-06 | End: 2022-08-05

## 2022-07-06 NOTE — TELEPHONE ENCOUNTER
----- Message from CHANCE Tristan - CNP sent at 7/6/2022  3:48 PM EDT -----  Anemia continues postpartum  Ferrous sulfate bid 325mg PO- sent script from office.   Let patient know results

## 2022-07-06 NOTE — TELEPHONE ENCOUNTER
Patient notified of results.  Cant  prescription until after 7/27, per yajaira patient can take OTC iron until then

## 2022-07-06 NOTE — PROGRESS NOTES
Latisha Haddad  2022  2:24 PM        Scott Polo is a 24 y.o. female       The patient was seen. She has no chief complaints today. She delivered vaginally on 2022. She is  breast feeding and there is not any signs or symptoms of mastitis. The patient completed the E.P.D.S. Evaluation form and scored 1. She does not have any signs or symptoms of post partum depression. She denies any suicidal thoughts with a plan, intent to harm others and delusional ideas. Today her lochia is light she denies any dizziness or shortness of breath. Her pregnancy was complicated by:  Patient Active Problem List    Diagnosis Date Noted    Gestational diabetes 2022     Priority: High     Overview Note:     2022 consult MFM      IUGR (intrauterine growth restriction) 2022     Priority: High    cHTN (no meds) w/ CRISTOFER w/o SF 2022     Priority: High     Overview Note:     Diagnosed off two elevated blood pressures: one in  and one in       GBS Positive 2022     Priority: High     Overview Note:     Treat in labor per protocol      Anemia 2022     Priority: High     Overview Note:     2022 ferrous sulfate 325mg PO BID      Elv Early 1 hr GTT 2022     Priority: High     Overview Note:     2022 3 hour GTT and Hgb a1c      Late prenatal care 2022     Priority: High     22 M Apg 8/9 Wt 5#12 2022     Priority: Medium    37 weeks gestation of pregnancy 2022     Priority: Medium    Iron deficiency anemia due to chronic blood loss 2022     Priority: Medium    Fetal Choroid plexus cyst 2022     Overview Note:     Noted on anatomy ultrasound 22  Resolved by 3/30/22      Martha-Parkinson-White 2016     Overview Note:     s/p ablation   Fetal echo: wnl      Orthostatic Hypotension      GERD 2014         She does admit to having good home support.       OB History    Para Term  AB Living   1 1 1 0 0 1   SAB IAB Ectopic Molar Multiple Live Births   0 0 0 0 0 1      # Outcome Date GA Lbr Antony/2nd Weight Sex Delivery Anes PTL Lv   1 Term 22 37w6d / 00:08 5 lb 12.8 oz (2.63 kg) M Vag-Spont None N RADHA      Name: Zahraa Reece: 8  Apgar5: 9       Patient Active Problem List   Diagnosis    GERD    Orthostatic Hypotension     Martha-Parkinson-White    Anemia    Elv Early 1 hr GTT    Late prenatal care    GBS Positive    Fetal Choroid plexus cyst    cHTN (no meds) w/ CRISTOFER w/o SF    Gestational diabetes    IUGR (intrauterine growth restriction) 2022    Iron deficiency anemia due to chronic blood loss    37 weeks gestation of pregnancy     22 M Apg 8/9 Wt 5#12       Blood pressure (!) 94/52, height 5' 4\" (1.626 m), weight 127 lb (57.6 kg), last menstrual period 10/10/2021, not currently breastfeeding. Abdomen: Soft and non-tender; good bowel sounds; no guarding, rebound or rigidity; no CVA tenderness bilaterally. Extremities: No calf tenderness bilaterally. DTR 2/4 bilaterally. No edema. Perineum: intact    Assessment:   Diagnosis Orders   1. Postpartum care and examination     2.  Anemia, unspecified type  Hemoglobin and Hematocrit    ferrous sulfate (IRON 325) 325 (65 Fe) MG tablet     Chief Complaint   Patient presents with    Postpartum Care     Patient Active Problem List    Diagnosis Date Noted    Gestational diabetes 2022     Priority: High     2022 consult MFM      IUGR (intrauterine growth restriction) 2022     Priority: High    cHTN (no meds) w/ CRISTOFER w/o SF 2022     Priority: High     Diagnosed off two elevated blood pressures: one in 2018 and one in 2019      GBS Positive 2022     Priority: High     Treat in labor per protocol      Anemia 2022     Priority: High     2022 ferrous sulfate 325mg PO BID      Elv Early 1 hr GTT 2022     Priority: High     2022 3 hour GTT and Hgb a1c      Late prenatal care 2022     Priority: High     22 M Apg 8/9 Wt 5#12 2022     Priority: Medium    37 weeks gestation of pregnancy 2022     Priority: Medium    Iron deficiency anemia due to chronic blood loss 2022     Priority: Medium    Fetal Choroid plexus cyst 2022     Noted on anatomy ultrasound 22  Resolved by 3/30/22      Martha-Parkinson-White 2016     s/p ablation   Fetal echo: wnl      Orthostatic Hypotension      GERD 2014         EPDS Score of 1        Plan:  1. Return to the office in  3-4 weeks  2. Signs & Symptoms of mastitis reviewed; notify if occurs  3. Secondary smoke risks reviewed. Increased risks of respiratory problems, Sudden     infant death syndrome, and potential malignancies. 4. Abstinence  5. Family planning counseling and STD counseling completed  6. Return in about 4 weeks (around 8/3/2022) for 6 week PP visit. Lab slip given for H/H. Script for ferrous sulfate sent. Patient was seen with total face to face time of 20 minutes. More than 50% of this visit was on counseling and education regarding her    Diagnosis Orders   1. Postpartum care and examination     2. Anemia, unspecified type  Hemoglobin and Hematocrit    ferrous sulfate (IRON 325) 325 (65 Fe) MG tablet    and her options. She was also counseled on her preventative health maintenance recommendations and follow-up.

## 2022-08-02 PROBLEM — R73.09 ABNORMAL GLUCOSE TOLERANCE TEST (GTT): Status: RESOLVED | Noted: 2022-02-11 | Resolved: 2022-08-02

## 2022-08-02 PROBLEM — O09.30 LATE PRENATAL CARE: Status: RESOLVED | Noted: 2022-02-11 | Resolved: 2022-08-02

## 2022-08-02 PROBLEM — O36.5990 IUGR (INTRAUTERINE GROWTH RESTRICTION) AFFECTING CARE OF MOTHER: Status: RESOLVED | Noted: 2022-05-09 | Resolved: 2022-08-02

## 2022-08-02 PROBLEM — O99.820 GBS (GROUP B STREPTOCOCCUS CARRIER), +RV CULTURE, CURRENTLY PREGNANT: Status: RESOLVED | Noted: 2022-02-14 | Resolved: 2022-08-02

## 2022-08-02 PROBLEM — G93.0 CHOROID PLEXUS CYST: Status: RESOLVED | Noted: 2022-02-23 | Resolved: 2022-08-02

## 2023-01-11 ENCOUNTER — OFFICE VISIT (OUTPATIENT)
Dept: OBGYN CLINIC | Age: 23
End: 2023-01-11

## 2023-01-11 ENCOUNTER — HOSPITAL ENCOUNTER (OUTPATIENT)
Age: 23
Setting detail: SPECIMEN
Discharge: HOME | End: 2023-01-11

## 2023-01-11 VITALS
DIASTOLIC BLOOD PRESSURE: 64 MMHG | HEART RATE: 85 BPM | WEIGHT: 122.2 LBS | BODY MASS INDEX: 20.98 KG/M2 | SYSTOLIC BLOOD PRESSURE: 113 MMHG

## 2023-01-11 DIAGNOSIS — I10 CHRONIC HYPERTENSION: ICD-10-CM

## 2023-01-11 DIAGNOSIS — Z32.01 POSITIVE PREGNANCY TEST: ICD-10-CM

## 2023-01-11 DIAGNOSIS — O10.919 CHRONIC HYPERTENSION AFFECTING PREGNANCY: ICD-10-CM

## 2023-01-11 DIAGNOSIS — Z33.1 IUP (INTRAUTERINE PREGNANCY), INCIDENTAL: ICD-10-CM

## 2023-01-11 DIAGNOSIS — O09.92 SUPERVISION OF HIGH RISK PREGNANCY IN SECOND TRIMESTER: ICD-10-CM

## 2023-01-11 DIAGNOSIS — N91.2 AMENORRHEA: ICD-10-CM

## 2023-01-11 DIAGNOSIS — Z3A.18 18 WEEKS GESTATION OF PREGNANCY: ICD-10-CM

## 2023-01-11 DIAGNOSIS — Z3A.18 18 WEEKS GESTATION OF PREGNANCY: Primary | ICD-10-CM

## 2023-01-11 DIAGNOSIS — O24.419 GESTATIONAL DIABETES MELLITUS (GDM), ANTEPARTUM, GESTATIONAL DIABETES METHOD OF CONTROL UNSPECIFIED: ICD-10-CM

## 2023-01-11 DIAGNOSIS — Z86.32 HISTORY OF DIET CONTROLLED GESTATIONAL DIABETES MELLITUS (GDM): ICD-10-CM

## 2023-01-11 DIAGNOSIS — I45.6 WPW (WOLFF-PARKINSON-WHITE SYNDROME): ICD-10-CM

## 2023-01-11 PROBLEM — D64.9 ANEMIA: Status: RESOLVED | Noted: 2022-02-11 | Resolved: 2023-01-11

## 2023-01-11 LAB
ABO/RH: NORMAL
ABSOLUTE EOS #: 0.04 K/UL (ref 0–0.44)
ABSOLUTE IMMATURE GRANULOCYTE: 0.05 K/UL (ref 0–0.3)
ABSOLUTE LYMPH #: 1.79 K/UL (ref 1.1–3.7)
ABSOLUTE MONO #: 0.5 K/UL (ref 0.1–1.2)
ALBUMIN SERPL-MCNC: 3.9 G/DL (ref 3.5–5.2)
ALBUMIN/GLOBULIN RATIO: 1.1 (ref 1–2.5)
ALP BLD-CCNC: 53 U/L (ref 35–104)
ALT SERPL-CCNC: 12 U/L (ref 5–33)
ANION GAP SERPL CALCULATED.3IONS-SCNC: 11 MMOL/L (ref 9–17)
ANTIBODY SCREEN: NEGATIVE
AST SERPL-CCNC: 18 U/L
BASOPHILS # BLD: 0 % (ref 0–2)
BASOPHILS ABSOLUTE: 0.04 K/UL (ref 0–0.2)
BILIRUB SERPL-MCNC: 0.2 MG/DL (ref 0.3–1.2)
BUN BLDV-MCNC: 8 MG/DL (ref 6–20)
CALCIUM SERPL-MCNC: 9.5 MG/DL (ref 8.6–10.4)
CHLAMYDIA TRACHOMATIS AMPLIFIED DET: NORMAL
CHLORIDE BLD-SCNC: 100 MMOL/L (ref 98–107)
CO2: 23 MMOL/L (ref 20–31)
CREAT SERPL-MCNC: 0.4 MG/DL (ref 0.5–0.9)
EOSINOPHILS RELATIVE PERCENT: 0 % (ref 1–4)
GFR SERPL CREATININE-BSD FRML MDRD: >60 ML/MIN/1.73M2
GLUCOSE BLD-MCNC: 83 MG/DL (ref 70–99)
HCT VFR BLD CALC: 32.4 % (ref 36.3–47.1)
HEMOGLOBIN: 9.9 G/DL (ref 11.9–15.1)
HEPATITIS B SURFACE ANTIGEN: NONREACTIVE
HEPATITIS C ANTIBODY: NONREACTIVE
HIV AG/AB: NONREACTIVE
IMMATURE GRANULOCYTES: 1 %
LYMPHOCYTES # BLD: 18 % (ref 24–43)
MCH RBC QN AUTO: 24 PG (ref 25.2–33.5)
MCHC RBC AUTO-ENTMCNC: 30.6 G/DL (ref 28.4–34.8)
MCV RBC AUTO: 78.5 FL (ref 82.6–102.9)
MONOCYTES # BLD: 5 % (ref 3–12)
N GONORRHOEAE AMPLIFIED DET: NORMAL
NRBC AUTOMATED: 0 PER 100 WBC
PDW BLD-RTO: 17.9 % (ref 11.8–14.4)
PLATELET # BLD: 402 K/UL (ref 138–453)
PMV BLD AUTO: 11.2 FL (ref 8.1–13.5)
POTASSIUM SERPL-SCNC: 3.7 MMOL/L (ref 3.7–5.3)
RBC # BLD: 4.13 M/UL (ref 3.95–5.11)
RBC # BLD: ABNORMAL 10*6/UL
RUBV IGG SER QL: 170.6 IU/ML
SEG NEUTROPHILS: 76 % (ref 36–65)
SEGMENTED NEUTROPHILS ABSOLUTE COUNT: 7.51 K/UL (ref 1.5–8.1)
SODIUM BLD-SCNC: 134 MMOL/L (ref 135–144)
T. PALLIDUM, IGG: NONREACTIVE
TOTAL PROTEIN: 7.4 G/DL (ref 6.4–8.3)
WBC # BLD: 9.9 K/UL (ref 3.5–11.3)

## 2023-01-11 RX ORDER — ASPIRIN 81 MG/1
81 TABLET ORAL DAILY
Qty: 90 TABLET | Refills: 1 | Status: SHIPPED | OUTPATIENT
Start: 2023-01-11

## 2023-01-11 RX ORDER — PNV NO.95/FERROUS FUM/FOLIC AC 28MG-0.8MG
1 TABLET ORAL DAILY
COMMUNITY
Start: 2023-01-03 | End: 2023-04-13

## 2023-01-11 NOTE — PROGRESS NOTES
Prenatal Visit    Latisha Haddad  2023   Patient's last menstrual period was 2022. Unknown      CC: Initial Prenatal Visit    Subjective:     Alda Hester is being seen today for her first obstetrical visit. This is not a planned pregnancy. She is a  at Unknown  Her obstetrical history is significant for short interconception. Hx FGR, cHTN, Hx cHTN w CRISTOFER w/o SFs, anemia and hx GDMA1. Hx WPW s/p ablation. Relationship with FOB: significant other, living together. Patient does intend to breast feed. Pregnancy history fully reviewed. Mother's ethnicity:        Objective:     Vitals:    23 1340   BP: 113/64   Site: Right Upper Arm   Position: Sitting   Cuff Size: Medium Adult   Pulse: 85   Weight: 122 lb 3.2 oz (55.4 kg)         Past Medical History:   Diagnosis Date    Dizziness 2015    Martha-Parkinson-White pattern 2013     Past Surgical History:   Procedure Laterality Date    ABLATION OF DYSRHYTHMIC FOCUS       Social History     Tobacco Use   Smoking Status Never   Smokeless Tobacco Never     Social History     Substance and Sexual Activity   Alcohol Use No     Current Outpatient Medications   Medication Sig Dispense Refill    Prenatal Multivit-Min-Fe-FA (PRENATAL/IRON) TABS Take 1 tablet by mouth daily      aspirin EC 81 MG EC tablet Take 1 tablet by mouth daily 90 tablet 1    ferrous sulfate (IRON 325) 325 (65 Fe) MG tablet Take 1 tablet by mouth 2 times daily 60 tablet 1     No current facility-administered medications for this visit. ALLERGIES:  Patient has no known allergies. General Appearance: This  is a well Developed, well Nourished, well groomed female. Her BMI was reviewed. Nutritional decision making was discussed, including weight gain goals in pregnancy. Skin:  There was a normal Inspection of the skin. There were no rashes or lesions. Lymphatic:  No Lymph Nodes were Palpable in the neck , axilla or groin.   Neck and EENT:  The neck was supple. There were no masses   The thyroid was not enlarged and had no masses. Perrla  Cardiovascular: The lungs were auscultated and found to be clear. There were no rales, rhonchi or wheezes. There was a good respiratory effort. The heart was in a regular rate and rhythm. There was no murmur appreciated. The patients extremities were without calf tenderness, edema, or varicosities. Abdomen: The abdomen was soft and non-tender. There were good bowel sounds in all quadrants and there was no guarding, rebound or rigidity. On evaluation there was no evidence of hepatosplenomegaly and there was no costal vertebral jerrell tenderness bilaterally. No hernias were appreciated. Psych: The patient had a normal Orientation to: Time, Place, Person, and Situation  There is no Mood / Affect changes  Breast:  (Chest) Discussed breastfeeding. Pelvic Exam:   External genitalia: General appearance; normal, Hair distribution; normal, Lesions absent  Urinary system: urethral meatus normal  Vaginal: normal mucosa, no discharge  Cervix: normal appearing cervix without discharge or lesions  Adnexa: normal adnexa in size, nontender and no masses  Uterus: normal single, nontender  Musculoskeletal:  Normal Gait and station was noted. Digits were evaluated without abnormal findings. Range of motion, stability and strength were evaluated and found to be appropriate for the patients age. Assessment:      Pregnancy at 18 and 4/7 weeks   Patient Active Problem List   Diagnosis    GERD    Orthostatic Hypotension     Martha-Parkinson-White    cHTN (no meds) w/ CRISTOFER w/o SF    Hx GDMA1    Iron deficiency anemia due to chronic blood loss     / M Apg 8/9 Wt 5#12    cHTN (no meds)          Plan:        Initial labs ordered. Prenatal vitamins ordered if needed  Problem list reviewed and updated. Cell free DNA testing was discussed: requested  Role of ultrasound in pregnancy discussed; fetal survey: requests.  M referral made.  Amniocentesis discussed: Not indicated  Cultures Collected  Follow-up in 1 week for ACOG  Referral for ECHO due to Hx WPW  Pre E labs ordered  Early 1 hr GTT ordered  MSAFP ordered        COUNSELING COMPLETED:  INITIAL OBSTETRICAL VISIT EVALUATION:  The patient was seen full history and physical was completed/reviewed. Cytology was collected for patients over 24years of age. Cultures were collected. The patient was counseled on the risks of tobacco abuse. Both maternal and fetal. She was instructed to stop smoking if currently using tobacco. Morbidity, mortality, and cessation programs were reviewed. The risks include but are not limited to increased risks of  labor,  delivery, premature rupture of membranes, intrauterine growth restriction, intrauterine fetal demise and abruptio placenta. Secondary smoke risks were also reviewed. Increases in cancer, respiratory problems, and sudden infant death syndrome were reviewed as well. Prenatal screening was discussed, including cell free DNA tests. Benefits of the above testing was reviewed. Risks, Benefits and non-invasive alternative testing was reviewed. The patient was questioned in detail regarding any genetic misnomer history, chromosomal abnormalities, or learning disabilities in  herself, the father of the baby or their families. SHE DENIED ANY HISTORY AS STATED ABOVE: YES    Upon completion of the visit all questions were answered and the patients follow-up and testing schedule were reviewed. Prenatal vitamins were given.     DO Chris Parsons Ob/Gyn   2023, 2:36 PM

## 2023-01-13 DIAGNOSIS — Z32.01 POSITIVE PREGNANCY TEST: ICD-10-CM

## 2023-01-13 DIAGNOSIS — Z33.1 IUP (INTRAUTERINE PREGNANCY), INCIDENTAL: ICD-10-CM

## 2023-01-13 DIAGNOSIS — Z3A.33 33 WEEKS GESTATION OF PREGNANCY: ICD-10-CM

## 2023-01-13 DIAGNOSIS — D64.9 ANEMIA, UNSPECIFIED TYPE: ICD-10-CM

## 2023-01-13 DIAGNOSIS — Z3A.19 19 WEEKS GESTATION OF PREGNANCY: ICD-10-CM

## 2023-01-13 DIAGNOSIS — Z3A.23 23 WEEKS GESTATION OF PREGNANCY: ICD-10-CM

## 2023-01-13 DIAGNOSIS — N91.2 AMENORRHEA: ICD-10-CM

## 2023-01-13 DIAGNOSIS — I10 CHRONIC HYPERTENSION: ICD-10-CM

## 2023-01-13 DIAGNOSIS — Z86.32 HISTORY OF DIET CONTROLLED GESTATIONAL DIABETES MELLITUS (GDM): ICD-10-CM

## 2023-01-13 LAB
AFP INTERPRETATION: NORMAL
AFP MOM: 1.08
AFP SPECIMEN: NORMAL
AFP: 60 NG/ML
DATE OF BIRTH: NORMAL
DATING METHOD: NORMAL
DETERMINED BY: NORMAL
DIABETIC: NEGATIVE
DONOR EGG?: NORMAL
DUE DATE: NORMAL
ESTIMATED DUE DATE: NORMAL
FAMILY HISTORY NTD: NEGATIVE
GESTATIONAL AGE: NORMAL
IN VITRO FERTILIZATION: NORMAL
INSULIN REQ DIABETES: NO
LAST MENSTRUAL PERIOD: NORMAL
MATERNAL AGE AT EDD: 22.6 YR
MATERNAL WEIGHT: 122
MONOCHORIONIC TWINS: NEGATIVE
NUMBER OF FETUSES: NORMAL
PATIENT WEIGHT UNITS: NORMAL
PATIENT WEIGHT: NORMAL
RACE (MATERNAL): NORMAL
RACE: NORMAL
REPEAT SPECIMEN?: NORMAL
SMOKING: NORMAL
SMOKING: NORMAL
VALPROIC/CARBAMAZEP: NORMAL
ZZ NTE CLEAN UP: HISTORY: NO

## 2023-01-17 ENCOUNTER — TELEPHONE (OUTPATIENT)
Dept: OBGYN CLINIC | Age: 23
End: 2023-01-17

## 2023-01-17 DIAGNOSIS — O09.92 HIGH-RISK PREGNANCY IN SECOND TRIMESTER: ICD-10-CM

## 2023-01-17 DIAGNOSIS — Z3A.19 19 WEEKS GESTATION OF PREGNANCY: Primary | ICD-10-CM

## 2023-01-17 DIAGNOSIS — I45.6 WPW (WOLFF-PARKINSON-WHITE SYNDROME): ICD-10-CM

## 2023-01-17 DIAGNOSIS — O99.012 ANEMIA AFFECTING PREGNANCY IN SECOND TRIMESTER: ICD-10-CM

## 2023-01-17 DIAGNOSIS — I10 CHRONIC HYPERTENSION: ICD-10-CM

## 2023-01-17 NOTE — TELEPHONE ENCOUNTER
----- Message from Terry Hendrix DO sent at 1/13/2023 12:27 PM EST -----  Anemia on initial prenatal labs. Will need iron studies and possible venofer infusions.

## 2023-01-17 NOTE — TELEPHONE ENCOUNTER
Pt voicemail is full. Called pt mother Benito Calabrese but she doesn't know Jose Kulkarni but she was able to understand it was for [facility] and there is a problem. Benito Calabrese put her Belkis/pt sister on phone. Explained that we are calling form Latisha office, she needs to call office back about a problem but her voicemail is full. Requested that maybe pt put her/Belkis on list because she speaks english.        When pt calls back:  -pt needs english speaking back up emergency number  -pt needs to get iron studies done  -pt may need iron infusion

## 2023-01-17 NOTE — TELEPHONE ENCOUNTER
Pt called back notified hem low and needs additional lab work done. She may need to get an iron infusion to help prevent needing blood transfusion in future. Pt agreeable to do 1 hr and iron studies when she comes in for her OB visit tomorrow.

## 2023-01-20 ENCOUNTER — TELEPHONE (OUTPATIENT)
Dept: OBGYN CLINIC | Age: 23
End: 2023-01-20

## 2023-01-20 DIAGNOSIS — O99.012 ANEMIA AFFECTING PREGNANCY IN SECOND TRIMESTER: ICD-10-CM

## 2023-01-20 DIAGNOSIS — I45.6 WPW (WOLFF-PARKINSON-WHITE SYNDROME): ICD-10-CM

## 2023-01-20 DIAGNOSIS — I10 CHRONIC HYPERTENSION: ICD-10-CM

## 2023-01-20 DIAGNOSIS — Z3A.19 19 WEEKS GESTATION OF PREGNANCY: Primary | ICD-10-CM

## 2023-01-20 DIAGNOSIS — Z86.32 HISTORY OF DIET CONTROLLED GESTATIONAL DIABETES MELLITUS (GDM): ICD-10-CM

## 2023-01-20 DIAGNOSIS — O09.92 HIGH-RISK PREGNANCY IN SECOND TRIMESTER: ICD-10-CM

## 2023-01-20 RX ORDER — ASPIRIN 81 MG/1
162 TABLET, CHEWABLE ORAL DAILY
Qty: 60 TABLET | Refills: 5 | Status: SHIPPED | OUTPATIENT
Start: 2023-01-20

## 2023-01-20 NOTE — TELEPHONE ENCOUNTER
----- Message from Jillian Cisneros DO sent at 2023  4:25 PM EST -----  Regarding: RE: would this be a 2 ASA pt? Yes, she'd be a great candidate for 2 ASA daily. Thanks,    DO Chris Roman Ob/Gyn   2023, 4:25 PM    ----- Message -----  From: Lizzy Barr RN  Sent: 2023   4:03 PM EST  To: Jillian Cisneros DO  Subject: would this be a 2 ASA pt?                        Maris French    This pt is coming in for ACOG tomorrow. Ashley Rogel is being seen today for her first obstetrical visit. This is not a planned pregnancy. She is a  at Unknown  Her obstetrical history is significant for short interconception. Hx FGR, cHTN, Hx cHTN w CRISTOFER w/o SFs, anemia and hx GDMA1. Hx WPW s/p ablation. Relationship with FOB: significant other, living together. Patient does intend to breast feed. Pregnancy history fully reviewed.     Yan Galo

## 2023-01-20 NOTE — TELEPHONE ENCOUNTER
OB 19.6wk LMOR calling to let her know that the providers at [address] want her to start on daily baby ASA at 162 mg per day to help with Hx BP problems in pregnancy. Patient returned call.   Call transferred to scheduling

## 2023-01-31 ENCOUNTER — INITIAL PRENATAL (OUTPATIENT)
Dept: OBGYN CLINIC | Age: 23
End: 2023-01-31

## 2023-01-31 ENCOUNTER — HOSPITAL ENCOUNTER (OUTPATIENT)
Age: 23
Setting detail: SPECIMEN
Discharge: HOME | End: 2023-01-31

## 2023-01-31 VITALS
DIASTOLIC BLOOD PRESSURE: 62 MMHG | WEIGHT: 126 LBS | SYSTOLIC BLOOD PRESSURE: 100 MMHG | BODY MASS INDEX: 21.51 KG/M2 | HEIGHT: 64 IN

## 2023-01-31 DIAGNOSIS — O99.012 ANEMIA AFFECTING PREGNANCY IN SECOND TRIMESTER: ICD-10-CM

## 2023-01-31 DIAGNOSIS — Z3A.21 PREGNANCY WITH 21 COMPLETED WEEKS GESTATION: Primary | ICD-10-CM

## 2023-01-31 DIAGNOSIS — I10 CHRONIC HYPERTENSION: ICD-10-CM

## 2023-01-31 DIAGNOSIS — O09.92 HIGH-RISK PREGNANCY IN SECOND TRIMESTER: ICD-10-CM

## 2023-01-31 DIAGNOSIS — Z86.32 HISTORY OF DIET CONTROLLED GESTATIONAL DIABETES MELLITUS (GDM): ICD-10-CM

## 2023-01-31 DIAGNOSIS — I45.6 WPW (WOLFF-PARKINSON-WHITE SYNDROME): ICD-10-CM

## 2023-01-31 DIAGNOSIS — Z3A.19 19 WEEKS GESTATION OF PREGNANCY: ICD-10-CM

## 2023-01-31 LAB
ABSOLUTE EOS #: 0.04 K/UL (ref 0–0.44)
ABSOLUTE IMMATURE GRANULOCYTE: 0.03 K/UL (ref 0–0.3)
ABSOLUTE LYMPH #: 1.52 K/UL (ref 1.1–3.7)
ABSOLUTE MONO #: 0.43 K/UL (ref 0.1–1.2)
BASOPHILS # BLD: 0 % (ref 0–2)
BASOPHILS ABSOLUTE: 0.03 K/UL (ref 0–0.2)
EOSINOPHILS RELATIVE PERCENT: 1 % (ref 1–4)
FERRITIN: 6 NG/ML (ref 13–150)
GLUCOSE ADMINISTRATION: NORMAL
GLUCOSE TOLERANCE SCREEN 50G: 113 MG/DL (ref 70–135)
HCT VFR BLD CALC: 31 % (ref 36.3–47.1)
HEMOGLOBIN: 9.4 G/DL (ref 11.9–15.1)
IMMATURE GRANULOCYTES: 0 %
IRON SATURATION: 29 % (ref 20–55)
IRON: 130 UG/DL (ref 37–145)
LYMPHOCYTES # BLD: 19 % (ref 24–43)
MCH RBC QN AUTO: 25.1 PG (ref 25.2–33.5)
MCHC RBC AUTO-ENTMCNC: 30.3 G/DL (ref 28.4–34.8)
MCV RBC AUTO: 82.9 FL (ref 82.6–102.9)
MONOCYTES # BLD: 5 % (ref 3–12)
NRBC AUTOMATED: 0 PER 100 WBC
PDW BLD-RTO: 18.6 % (ref 11.8–14.4)
PLATELET # BLD: 330 K/UL (ref 138–453)
PMV BLD AUTO: 11.1 FL (ref 8.1–13.5)
RBC # BLD: 3.74 M/UL (ref 3.95–5.11)
RBC # BLD: ABNORMAL 10*6/UL
SEG NEUTROPHILS: 75 % (ref 36–65)
SEGMENTED NEUTROPHILS ABSOLUTE COUNT: 6.01 K/UL (ref 1.5–8.1)
TOTAL IRON BINDING CAPACITY: 451 UG/DL (ref 250–450)
UNSATURATED IRON BINDING CAPACITY: 321 UG/DL (ref 112–347)
WBC # BLD: 8.1 K/UL (ref 3.5–11.3)

## 2023-01-31 RX ORDER — FERROUS SULFATE 325(65) MG
325 TABLET ORAL EVERY OTHER DAY
Qty: 180 TABLET | Refills: 3 | Status: SHIPPED | OUTPATIENT
Start: 2023-01-31

## 2023-01-31 NOTE — PROGRESS NOTES
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  This is a planned pregnancy:No, not preventing   Certain LMP:Yes  S/S of pregnancy:Yes, missed cycle  Hx N/V pregnancy:N/V in first trimester       Mother's ethnicity: [place]   Father's ethnicity: [place]      Patient Active Problem List   Diagnosis    GERD    Orthostatic Hypotension     Martha-Parkinson-White    cHTN (no meds) w/ CRISTOFER w/o SF    Hx GDMA1    Iron deficiency anemia due to chronic blood loss     22 M Apg 8/9 Wt 5#12    cHTN (no meds)     Blood pressure 100/62, height 5' 4\" (1.626 m), weight 126 lb (57.2 kg), last menstrual period 2022, not currently breastfeeding. Ubisense is a 25 y.o. , here for her ACOG. The patients past medical, surgical, social and family history were reviewed. Current medications and allergies were reviewed, and documented in the chart. Menstrual history: regular  Birth control: none.     Wt Readings from Last 3 Encounters:   23 126 lb (57.2 kg)   23 122 lb 3.2 oz (55.4 kg)   22 127 lb (57.6 kg)     Recent Results (from the past 8736 hour(s))   Rubella antibody, IgG    Collection Time: 02/10/22  4:14 PM   Result Value Ref Range    Rubella Antibody, IgG 208.9 IU/mL   CBC Auto Differential    Collection Time: 02/10/22  4:14 PM   Result Value Ref Range    WBC 7.5 4.5 - 13.5 k/uL    RBC 4.03 4.0 - 5.2 m/uL    Hemoglobin 8.6 (L) 12.0 - 16.0 g/dL    Hematocrit 27.0 (L) 36 - 46 %    MCV 67.0 (L) 80 - 100 fL    MCH 21.3 (L) 26 - 34 pg    MCHC 31.8 31 - 37 g/dL    RDW 18.6 (H) 11.5 - 14.9 %    Platelets 505 304 - 105 k/uL    MPV 7.3 6.0 - 12.0 fL    NRBC Automated NOT REPORTED per 100 WBC    Differential Type NOT REPORTED     Immature Granulocytes NOT REPORTED 0 %    Absolute Immature Granulocyte NOT REPORTED 0.00 - 0.30 k/uL    WBC Morphology NOT REPORTED     RBC Morphology NOT REPORTED     Platelet Estimate NOT REPORTED     Seg Neutrophils 74 (H) 34 - 64 %    Lymphocytes 18 (L) 25 - 45 %    Monocytes 6 2 - 8 %    Eosinophils % 1 0 - 4 %    Basophils 1 0 - 2 %    Segs Absolute 5.60 1.3 - 9.1 k/uL    Absolute Lymph # 1.30 1.0 - 4.8 k/uL    Absolute Mono # 0.40 0.1 - 1.3 k/uL    Absolute Eos # 0.10 0.0 - 0.4 k/uL    Basophils Absolute 0.00 0.0 - 0.2 k/uL   TSH with Reflex    Collection Time: 02/10/22  4:14 PM   Result Value Ref Range    TSH 0.85 0.30 - 5.00 mIU/L   Hepatitis B Surface Antigen Obstetric Panel    Collection Time: 02/10/22  4:14 PM   Result Value Ref Range    Hepatitis B Surface Ag NONREACTIVE NONREACTIVE   HIV Screen    Collection Time: 02/10/22  4:14 PM   Result Value Ref Range    HIV Ag/Ab NONREACTIVE NONREACTIVE   Glucose tolerance, 1 hour    Collection Time: 02/10/22  4:14 PM   Result Value Ref Range    GLU ADMN Glucola     Glucose tolerance screen 50g 134 70 - 135 mg/dL   T. pallidum Ab    Collection Time: 02/10/22  4:14 PM   Result Value Ref Range    T. pallidum, IgG NONREACTIVE NONREACTIVE   PRENATAL TYPE AND SCREEN    Collection Time: 02/10/22  4:15 PM   Result Value Ref Range    ABO/Rh A POSITIVE     Antibody Screen NEGATIVE     Blood Bank Comment A POSITIVE 404  Results Verified      Urinalysis Reflex to Culture    Collection Time: 02/10/22  4:16 PM    Specimen: Urine, clean catch   Result Value Ref Range    Color, UA Yellow Yellow    Turbidity UA Cloudy (A) Clear    Glucose, Ur TRACE (A) NEGATIVE    Bilirubin Urine NEGATIVE NEGATIVE    Ketones, Urine TRACE (A) NEGATIVE    Specific Gravity, UA 1.025 1.000 - 1.030    Urine Hgb NEGATIVE NEGATIVE    pH, UA 6.0 5.0 - 8.0    Protein, UA TRACE (A) NEGATIVE    Urobilinogen, Urine Normal Normal    Nitrite, Urine NEGATIVE NEGATIVE    Leukocyte Esterase, Urine MOD (A) NEGATIVE    Urinalysis Comments NOT REPORTED    Microscopic Urinalysis    Collection Time: 02/10/22  4:16 PM   Result Value Ref Range    -          WBC, UA 10 TO 20 /HPF    RBC, UA 0 TO 2 /HPF    Casts UA NOT REPORTED /LPF    Crystals, UA NOT REPORTED None /HPF    Epithelial Cells UA 10 TO 20 /HPF    Renal Epithelial, UA NOT REPORTED 0 /HPF    Bacteria, UA FEW (A) None    Mucus, UA 1+ (A) None    Trichomonas, UA NOT REPORTED None    Amorphous, UA NOT REPORTED None    Other Observations UA NOT REPORTED NOT REQ.    Yeast, UA NOT REPORTED None   Culture, Urine    Collection Time: 02/10/22  6:24 PM    Specimen: Urine, clean catch   Result Value Ref Range    Specimen Description .CLEAN CATCH URINE     Special Requests NOT REPORTED     Culture NO SIGNIFICANT GROWTH    GYN Cytology    Collection Time: 02/11/22  7:36 AM   Result Value Ref Range    Cytology Report       INTERPRETATION    Cervical material, (ThinPrep vial, Imaging-assisted review):  Specimen Adequacy:       Satisfactory for evaluation.       - Endocervical/transformation zone component present.  Descriptive Diagnosis:       Negative for intraepithelial lesion or malignancy.          Cytotechnologist:   VICKEY ESPARZA(ASCP)  **Electronically Signed Out**  ey/2/22/2022          Source:  A: Cervical material, (ThinPrep vial, Imaging-assisted review)    Clinical History  Pregnant: 10 weeks  LMP:  10/10/2021  GYNECOLOGIC CYTOLOGY REPORT    Patient Name: DIANNA CHASE  Med Rec: [id number]  Path Number: [id number]  [facility]  CONSULTING PATHOLOGISTS [facility]  ANATOMIC PATHOLOGY  [address] [place] [address].  [place], [place] [address] [phone]  Fax: [phone]     C.trachomatis N.gonorrhoeae DNA    Collection Time: 02/11/22  1:23 PM    Specimen: Nasopharyngeal Swab   Result Value Ref Range    Specimen Description .NASOPHARYNGEAL SWAB     C. trachomatis DNA NEGATIVE NEGATIVE    N. gonorrhoeae DNA NEGATIVE NEGATIVE   Culture, Genital    Collection Time: 02/11/22  1:24 PM    Specimen: Vaginal; Genital   Result Value Ref Range    Specimen Description .VAGINA     Special Requests NOT  REPORTED     Culture STREPTOCOCCI, BETA HEMOLYTIC GROUP B ISOLATED (A)     Culture PRESUMPTIVE CANDIDA ALBICANS LIGHT GROWTH (A)     Culture NORMAL URO-GENITAL TOÑITO     Culture NEGATIVE FOR NEISSERIA GONORRHOEAE    US OB DETAIL FETAL ANATOMY SINGLE OR FIRST GESTATION    Collection Time: 02/23/22 12:00 AM   Result Value Ref Range    Biparietal Diameter      Abdominal Circumference      Femoral Diameter      Head Circumference      HC/AC      Estimated Fetal Weight     US OB TRANSVAGINAL    Collection Time: 02/23/22 12:00 AM   Result Value Ref Range    Biparietal Diameter      Abdominal Circumference      Femoral Diameter      Head Circumference      HC/AC      Estimated Fetal Weight     Alpha Fetoprotein, Maternal    Collection Time: 02/23/22 11:39 AM   Result Value Ref Range    Date of Birth 2000     Maternal Weight 136     Patient Weight Units LBS     Due Date SEE NOTE     Determined by Ultrasound     Last Menstrual Period 10/10/2021     Monochorionic Twins NEGATIVE     Race (Maternal)       Diabetic NEGATIVE     Smoking INFORMATION NOT PROVIDED     Valproic/Carbamazep INFORMATION NOT PROVIDED     Fam HX NTD NEGATIVE     In Vitro Fertilization INFORMATION NOT PROVIDED     Donor Egg? INFORMATION NOT PROVIDED     Repeat Specimen? NEGATIVE     AFP (Alpha Fetoprotein) 56 ng/mL    AFP Mom 0.75     AFP Interp Screen Neg     [age]    Patient Weight 136.0 lbs.      Est Due Date 07/06/2022     Gestational Age 21 wks, 0 days     Dating Method US     Number of Fetuses Sanders     Race Nonblack     Insulin Req Diabetes No     Smoking Unknown     History No     AFP Specimen See Note    MISCELLANEOUS TESTING    Collection Time: 02/23/22 11:39 AM   Result Value Ref Range    Test Name UNITY     Send Out Report FORWARD UNIT KIT    Creatinine, Random Urine    Collection Time: 02/23/22 11:59 AM   Result Value Ref Range    Creatinine, Ur 164.9 28.0 - 217.0 mg/dL   US OB FOLLOW UP TRANSABDOMINAL APPROACH    Collection Time: 03/30/22 12:00 AM   Result Value Ref Range    Biparietal Diameter      Abdominal Circumference      Femoral Diameter      Head Circumference      HC/AC      Estimated Fetal Weight     STREP SCREEN GROUP A THROAT    Collection Time: 05/03/22 12:30 PM    Specimen: Throat   Result Value Ref Range    Source . THROAT SWAB     Strep A Ag NEGATIVE NEGATIVE   COVID-19, Rapid    Collection Time: 05/03/22 12:30 PM    Specimen: Nasopharyngeal Swab   Result Value Ref Range    Specimen Description . NASOPHARYNGEAL SWAB     SARS-CoV-2, Rapid Not Detected Not Detected   Rapid influenza A/B antigens    Collection Time: 05/03/22 12:30 PM   Result Value Ref Range    Flu A Antigen NEGATIVE NEGATIVE    Flu B Antigen NEGATIVE NEGATIVE   Comprehensive Metabolic Panel    Collection Time: 05/03/22  2:51 PM   Result Value Ref Range    Glucose 101 (H) 70 - 99 mg/dL    BUN 5 (L) 6 - 20 mg/dL    Creatinine 0.50 0.50 - 0.90 mg/dL    Calcium 8.8 8.6 - 10.4 mg/dL    Sodium 131 (L) 135 - 144 mmol/L    Potassium 3.0 (L) 3.7 - 5.3 mmol/L    Chloride 96 (L) 98 - 107 mmol/L    CO2 20 20 - 31 mmol/L    Anion Gap 15 9 - 17 mmol/L    Alkaline Phosphatase 82 35 - 104 U/L    ALT 11 5 - 33 U/L    AST 22 <32 U/L    Total Bilirubin 0.36 0.3 - 1.2 mg/dL    Total Protein 7.6 6.4 - 8.3 g/dL    Albumin 3.9 3.5 - 5.2 g/dL    GFR Non-African American >60 >60 mL/min    GFR African American >60 >60 mL/min    GFR Comment         Urine culture    Collection Time: 05/03/22  2:56 PM    Specimen: Urine, clean catch   Result Value Ref Range    Specimen Description . CLEAN CATCH URINE     Culture NO GROWTH    Urinalysis    Collection Time: 05/03/22  2:57 PM   Result Value Ref Range    Color, UA Dark Yellow (A) Yellow    Turbidity UA Cloudy (A) Clear    Glucose, Ur TRACE (A) NEGATIVE    Bilirubin Urine NEGATIVE  Verified by ictotest. (A) NEGATIVE    Ketones, Urine 4+ (A) NEGATIVE    Specific Gravity, UA 1.020 1.000 - 1.030    Urine Hgb TRACE (A) NEGATIVE    pH, UA 6.5 5.0 - 8.0    Protein, UA 1+ (A) NEGATIVE    Urobilinogen, Urine Normal Normal    Nitrite, Urine NEGATIVE NEGATIVE    Leukocyte Esterase, Urine MOD (A) NEGATIVE   Microscopic Urinalysis    Collection Time: 05/03/22  2:57 PM   Result Value Ref Range    -          WBC, UA 10 TO 20 /HPF    RBC, UA 3 to 5 /HPF    Epithelial Cells UA 10 TO 20 /HPF    Bacteria, UA MODERATE (A) None    Mucus, UA 1+ (A) None   US OB > 14 weeks single fetus    Collection Time: 05/04/22 12:00 AM   Result Value Ref Range    Biparietal Diameter      Abdominal Circumference      Femoral Diameter      Head Circumference      HC/AC      Estimated Fetal Weight     Urinalysis with Reflex to Culture    Collection Time: 05/07/22  8:05 AM    Specimen: Urine, clean catch   Result Value Ref Range    Color, UA Dark Yellow (A) Yellow    Turbidity UA Cloudy (A) Clear    Glucose, Ur SMALL (A) NEGATIVE    Bilirubin Urine NEGATIVE  Verified by ictotest. (A) NEGATIVE    Ketones, Urine TRACE (A) NEGATIVE    Specific Gravity, UA 1.026 1.000 - 1.030    Urine Hgb NEGATIVE NEGATIVE    pH, UA 6.0 5.0 - 8.0    Protein, UA 1+ (A) NEGATIVE    Urobilinogen, Urine Normal Normal    Nitrite, Urine NEGATIVE NEGATIVE    Leukocyte Esterase, Urine MOD (A) NEGATIVE   Microscopic Urinalysis    Collection Time: 05/07/22  8:05 AM   Result Value Ref Range    WBC, UA 21 TO 50 /HPF    RBC, UA 0 TO 2 /HPF    Casts UA 3 to 5 /LPF    Epithelial Cells UA 10 TO 20 /HPF    Bacteria, UA MANY (A) None    Mucus, UA 1+ (A) None   Culture, Urine    Collection Time: 05/07/22  8:05 AM    Specimen: Urine, clean catch   Result Value Ref Range    Specimen Description . CLEAN CATCH URINE     Culture NO SIGNIFICANT GROWTH    GLUCOSE BARTOLO.  3 HR    Collection Time: 05/07/22  8:06 AM   Result Value Ref Range    Amount Glucose Given 100 g    Glucose, Fasting 103 (H) 65 - 94 mg/dL    Glucose, GTT - 1 Hour 178 65 - 179 mg/dL    Glucose, GTT - 2 Hour 184 (H) 65 - 154 mg/dL 3 Hr Glucose 140 (H) 65 - 139 mg/dL   Hemoglobin A1C    Collection Time: 05/07/22  8:06 AM   Result Value Ref Range    Hemoglobin A1C 5.5 4.0 - 6.0 %    Estimated Avg Glucose 111 mg/dL   CBC with Auto Differential    Collection Time: 05/07/22  8:06 AM   Result Value Ref Range    WBC 6.5 4.5 - 13.5 k/uL    RBC 3.80 (L) 4.0 - 5.2 m/uL    Hemoglobin 8.1 (L) 12.0 - 16.0 g/dL    Hematocrit 25.1 (L) 36 - 46 %    MCV 66.0 (L) 80 - 100 fL    MCH 21.4 (L) 26 - 34 pg    MCHC 32.4 31 - 37 g/dL    RDW 20.9 (H) 11.5 - 14.9 %    Platelets 508 (H) 080 - 450 k/uL    MPV 7.8 6.0 - 12.0 fL    Seg Neutrophils 54 34 - 64 %    Lymphocytes 36 25 - 45 %    Monocytes 8 2 - 8 %    Eosinophils % 1 0 - 4 %    Basophils 1 0 - 2 %    Segs Absolute 3.50 1.3 - 9.1 k/uL    Absolute Lymph # 2.34 1.0 - 4.8 k/uL    Absolute Mono # 0.52 0.1 - 1.3 k/uL    Absolute Eos # 0.07 0.0 - 0.4 k/uL    Basophils Absolute 0.07 0.0 - 0.2 k/uL    Morphology ANISOCYTOSIS PRESENT     Morphology HYPOCHROMIA PRESENT     Morphology MICROCYTOSIS PRESENT     Morphology 1+ ELLIPTOCYTES    US OB FOLLOW UP TRANSABDOMINAL APPROACH    Collection Time: 05/26/22 12:00 AM   Result Value Ref Range    Biparietal Diameter      Abdominal Circumference      Femoral Diameter      Head Circumference      HC/AC      Estimated Fetal Weight     Ferritin    Collection Time: 05/27/22 12:26 PM   Result Value Ref Range    Ferritin 18 13 - 150 ng/mL   Hemoglobinopathy Evaluation    Collection Time: 05/27/22 12:26 PM   Result Value Ref Range    Hgb Electrophoresis Interp NORMAL HB ELECTROPHORESIS PATTERN. Pathologist ELECTRONICALLY SIGNED. Fahad Silverman M.D.     Haptoglobin    Collection Time: 05/27/22 12:26 PM   Result Value Ref Range    Haptoglobin 134 30 - 200 mg/dL   Reticulocytes    Collection Time: 05/27/22 12:26 PM   Result Value Ref Range    Retic % 2.9 (H) 0.5 - 2.0 %    Absolute Retic # 0.122 (H) 0.0245 - 0.098 M/uL   Iron and TIBC    Collection Time: 05/27/22 12:26 PM   Result Value Ref Range    Iron 37 37 - 145 ug/dL    TIBC 565 (H) 250 - 450 ug/dL    Iron Saturation 7 (L) 20 - 55 %    UIBC 528 (H) 112 - 347 ug/dL   POC Glucose Fingerstick    Collection Time: 06/02/22  1:34 PM   Result Value Ref Range    POC Glucose 84 65 - 105 mg/dL    OB FOLLOW UP TRANSABDOMINAL APPROACH    Collection Time: 06/16/22 12:00 AM   Result Value Ref Range    Biparietal Diameter      Abdominal Circumference      Femoral Diameter      Head Circumference      HC/AC      Estimated Fetal Weight     Culture, Strep B Screen, Vaginal/Rectal    Collection Time: 06/17/22  2:41 PM    Specimen: Vaginal; Genital   Result Value Ref Range    Specimen Description . VAGINA     Culture NEGATIVE FOR GROUP B STREPTOCOCCI    TYPE AND SCREEN    Collection Time: 06/19/22  9:45 PM   Result Value Ref Range    Expiration Date 06/22/2022,2359     Arm Band Number BE [id number]     ABO/Rh A POSITIVE     Antibody Screen NEGATIVE    CBC auto differential    Collection Time: 06/19/22  9:46 PM   Result Value Ref Range    WBC 9.9 4.5 - 13.5 k/uL    RBC 3.92 (L) 3.95 - 5.11 m/uL    Hemoglobin 8.9 (L) 11.9 - 15.1 g/dL    Hematocrit 29.6 (L) 36.3 - 47.1 %    MCV 75.5 (L) 82.6 - 102.9 fL    MCH 22.7 (L) 25.2 - 33.5 pg    MCHC 30.1 28.4 - 34.8 g/dL    RDW 20.8 (H) 11.8 - 14.4 %    Platelets 769 026 - 824 k/uL    MPV 10.7 8.1 - 13.5 fL    NRBC Automated 0.0 0.0 per 100 WBC    Immature Granulocytes 0 0 %    Seg Neutrophils 72 (H) 34 - 64 %    Lymphocytes 22 (L) 25 - 45 %    Atypical Lymphocytes 2 %    Monocytes 4 2 - 8 %    Eosinophils % 0 (L) 1 - 4 %    Basophils 0 0 - 2 %    Absolute Immature Granulocyte 0.00 0.00 - 0.30 k/uL    Segs Absolute 7.12 1.8 - 7.7 k/uL    Absolute Lymph # 2.18 1.0 - 4.8 k/uL    Atypical Lymphocytes Absolute 0.20 k/uL    Absolute Mono # 0.40 0.1 - 1.4 k/uL    Absolute Eos # 0.00 0.0 - 0.4 k/uL    Basophils Absolute 0.00 0.0 - 0.2 k/uL    Morphology MICROCYTOSIS PRESENT     Morphology ANISOCYTOSIS PRESENT     Morphology 1+ POLYCHROMASIA     Morphology 1+ SCHISTOCYTES    Comprehensive metabolic panel    Collection Time: 06/19/22  9:46 PM   Result Value Ref Range    Glucose 92 70 - 99 mg/dL    BUN 8 6 - 20 mg/dL    Creatinine 0.41 (L) 0.50 - 0.90 mg/dL    Calcium 9.1 8.6 - 10.4 mg/dL    Sodium 135 135 - 144 mmol/L    Potassium 3.7 3.7 - 5.3 mmol/L    Chloride 101 98 - 107 mmol/L    CO2 21 20 - 31 mmol/L    Anion Gap 13 9 - 17 mmol/L    Alkaline Phosphatase 133 (H) 35 - 104 U/L    ALT 8 5 - 33 U/L    AST 18 <32 U/L    Total Bilirubin 0.33 0.3 - 1.2 mg/dL    Total Protein 7.2 6.4 - 8.3 g/dL    Albumin 3.9 3.5 - 5.2 g/dL    Albumin/Globulin Ratio 1.2 1.0 - 2.5    GFR Non-African American >60 >60 mL/min    GFR African American >60 >60 mL/min    GFR Comment         T. pallidum Ab    Collection Time: 06/19/22  9:47 PM   Result Value Ref Range    T. pallidum, IgG NONREACTIVE NONREACTIVE   Protime-INR    Collection Time: 06/19/22 11:50 PM   Result Value Ref Range    Protime 9.7 9.1 - 12.3 sec    INR 0.9    APTT    Collection Time: 06/19/22 11:50 PM   Result Value Ref Range    PTT 21.4 20.5 - 30.5 sec   Fibrinogen    Collection Time: 06/19/22 11:50 PM   Result Value Ref Range    Fibrinogen 473 (H) 140 - 420 mg/dL   DRUG SCREEN MULTI URINE    Collection Time: 06/20/22 12:20 AM   Result Value Ref Range    Amphetamine Screen, Ur NEGATIVE NEGATIVE    Barbiturate Screen, Ur NEGATIVE NEGATIVE    Benzodiazepine Screen, Urine NEGATIVE NEGATIVE    Cocaine Metabolite, Urine NEGATIVE NEGATIVE    Methadone Screen, Urine NEGATIVE NEGATIVE    Opiates, Urine NEGATIVE NEGATIVE    Phencyclidine, Urine NEGATIVE NEGATIVE    Cannabinoid Scrn, Ur NEGATIVE NEGATIVE    Oxycodone Screen, Ur NEGATIVE NEGATIVE    Test Information       Assay provides medical screening only. The absence of expected drug(s) and/or metabolite(s) may indicate diluted or adulterated urine, limitations of testing or timing of collection.    Protein / Creatinine Ratio, Urine    Collection Time: 06/20/22 12:20 AM   Result Value Ref Range    Total Protein, Urine 53 mg/dL    Creatinine, Ur 178.9 28.0 - 217.0 mg/dL    Urine Total Protein Creatinine Ratio 0.30 (H) 0.00 - 0.20   POC Glucose Fingerstick    Collection Time: 06/20/22  7:19 AM   Result Value Ref Range    POC Glucose 89 65 - 105 mg/dL   POC Glucose Fingerstick    Collection Time: 06/20/22 11:21 AM   Result Value Ref Range    POC Glucose 94 65 - 105 mg/dL   POC Glucose Fingerstick    Collection Time: 06/21/22  7:12 AM   Result Value Ref Range    POC Glucose 97 65 - 105 mg/dL   POC Glucose Fingerstick    Collection Time: 06/21/22 12:01 PM   Result Value Ref Range    POC Glucose 104 65 - 105 mg/dL   Lactic Acid    Collection Time: 06/22/22  4:39 AM   Result Value Ref Range    Lactic Acid, Whole Blood 0.7 0.7 - 2.1 mmol/L   CBC    Collection Time: 06/22/22  4:39 AM   Result Value Ref Range    WBC 18.3 (H) 4.5 - 13.5 k/uL    RBC 3.59 (L) 3.95 - 5.11 m/uL    Hemoglobin 8.0 (L) 11.9 - 15.1 g/dL    Hematocrit 27.2 (L) 36.3 - 47.1 %    MCV 75.8 (L) 82.6 - 102.9 fL    MCH 22.3 (L) 25.2 - 33.5 pg    MCHC 29.4 28.4 - 34.8 g/dL    RDW 20.7 (H) 11.8 - 14.4 %    Platelets 300 173 - 467 k/uL    MPV 10.3 8.1 - 13.5 fL    NRBC Automated 0.0 0.0 per 100 WBC   Hemoglobin and Hematocrit    Collection Time: 07/06/22  2:45 PM   Result Value Ref Range    Hemoglobin 9.6 (L) 12.0 - 16.0 g/dL    Hematocrit 30.7 (L) 36 - 46 %   Chlamydia Trachomatis & Neisseria gonorrhoeae (GC) by amplified detection    Collection Time: 01/11/23 12:00 AM    Specimen: Cervix   Result Value Ref Range    N. Gonorrhoeae Amplified neg     C.  Trachomatis Amplified neg    Alpha Fetoprotein, Maternal    Collection Time: 01/11/23  2:15 PM   Result Value Ref Range    Date of Birth 2000     Maternal Weight 122     Patient Weight Units LBS     Due Date SEE NOTE     Determined by Other     Last Menstrual Period 09/03/2022     Monochorionic Twins NEGATIVE     Race (Maternal)   Diabetic NEGATIVE     Smoking INFORMATION NOT PROVIDED     Valproic/Carbamazep INFORMATION NOT PROVIDED     Fam HX NTD NEGATIVE     In Vitro Fertilization INFORMATION NOT PROVIDED     Donor Egg? INFORMATION NOT PROVIDED     Repeat Specimen? INFORMATION NOT PROVIDED     AFP (Alpha Fetoprotein) 60 ng/mL    AFP Mom 1.08     AFP Interp Screen Neg     [age] yr    Patient Weight 122.0 lbs.      Est Due Date 06/10/2023     Gestational Age 25 wks, 4 days     Dating Method LMP     Number of Fetuses Sanders     Race Nonblack     Insulin Req Diabetes No     Smoking Unknown     History No     AFP Specimen See Note    Comprehensive Metabolic Panel    Collection Time: 01/11/23  2:15 PM   Result Value Ref Range    Glucose 83 70 - 99 mg/dL    BUN 8 6 - 20 mg/dL    Creatinine 0.40 (L) 0.50 - 0.90 mg/dL    Est, Glom Filt Rate >60 >60 mL/min/1.73m2    Calcium 9.5 8.6 - 10.4 mg/dL    Sodium 134 (L) 135 - 144 mmol/L    Potassium 3.7 3.7 - 5.3 mmol/L    Chloride 100 98 - 107 mmol/L    CO2 23 20 - 31 mmol/L    Anion Gap 11 9 - 17 mmol/L    Alkaline Phosphatase 53 35 - 104 U/L    ALT 12 5 - 33 U/L    AST 18 <32 U/L    Total Bilirubin 0.2 (L) 0.3 - 1.2 mg/dL    Total Protein 7.4 6.4 - 8.3 g/dL    Albumin 3.9 3.5 - 5.2 g/dL    Albumin/Globulin Ratio 1.1 1.0 - 2.5   Prenatal Profile 1    Collection Time: 01/11/23  2:15 PM   Result Value Ref Range    WBC 9.9 3.5 - 11.3 k/uL    RBC 4.13 3.95 - 5.11 m/uL    Hemoglobin 9.9 (L) 11.9 - 15.1 g/dL    Hematocrit 32.4 (L) 36.3 - 47.1 %    MCV 78.5 (L) 82.6 - 102.9 fL    MCH 24.0 (L) 25.2 - 33.5 pg    MCHC 30.6 28.4 - 34.8 g/dL    RDW 17.9 (H) 11.8 - 14.4 %    Platelets 517 461 - 324 k/uL    MPV 11.2 8.1 - 13.5 fL    NRBC Automated 0.0 0.0 per 100 WBC    Seg Neutrophils 76 (H) 36 - 65 %    Lymphocytes 18 (L) 24 - 43 %    Monocytes 5 3 - 12 %    Eosinophils % 0 (L) 1 - 4 %    Basophils 0 0 - 2 %    Immature Granulocytes 1 (H) 0 %    Segs Absolute 7.51 1.50 - 8.10 k/uL    Absolute Lymph # 1.79 1.10 - 3.70 k/uL    Absolute Mono # 0.50 0.10 - 1.20 k/uL    Absolute Eos # 0.04 0.00 - 0.44 k/uL    Basophils Absolute 0.04 0.00 - 0.20 k/uL    Absolute Immature Granulocyte 0.05 0.00 - 0.30 k/uL    RBC Morphology ANISOCYTOSIS PRESENT     Hepatitis B Surface Ag NONREACTIVE NONREACTIVE    Rubella Antibody, IgG 170.6 IU/mL    T. 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    Collection Time: 01/18/23  1:31 PM   Result Value Ref Range    Report Summary See Notes     Report Note See Notes     Gender of Fetus Not reported     Fetal Fraction 10.0%     Trisomy 21 Result Text Low Risk     Trisomy 21 Age-Based Risk Text 1/1,295 (0.08%)     Trisomy 21 Risk Score Text <1/10,000 (<0.01%)     Trisomy 18 Result Text Low Risk     Trisomy 18 Age-Based Risk Text 1/4,897 (0.02%)     Trisomy 18 Risk Score Text <1/10,000 (<0.01%)     Trisomy 13 Result Text Low Risk     Trisomy 13 Age-Based Risk Text <1/10,000 (<0.01%)     Trisomy 13 Risk Score Text <1/10,000 (<0.01%)     Monosomy X Result Text Low Risk     Monosomy X Age-Based Risk Text 1/568 (0.18%)     Monosomy X Risk Score Text <1/10,000 (<0.01%)     Triploidy Result Text Low Risk     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 Syndrome Result Text Low Risk     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 Syndrome Population-Based Risk Text 1/2,000     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 Syndrome Risk Score Text 1/12,000     Footnotes See Notes        Past Medical History:   Diagnosis Date    Anemia     Dizziness 11/23/2015    Gestational diabetes mellitus     Heart defect     Heart disease     Hypertension     Martha-Parkinson-White pattern 01/25/2013                                                                   Past Surgical History:   Procedure Laterality Date    ABLATION OF DYSRHYTHMIC FOCUS      WISDOM TOOTH EXTRACTION       Family History   Problem Relation Age of Onset    Hypertension Mother     Diabetes type 2  Father         pre-diabetic    Hypertension Father 48    Anemia Sister     No Known Problems Sister     ADHD Brother     No Known Problems Brother     No Known Problems Brother     No Known Problems Brother      Social History     Tobacco Use   Smoking Status Never   Smokeless Tobacco Never     Social History     Substance and Sexual Activity   Alcohol Use No       MEDICATIONS:  Current Outpatient Medications   Medication Sig Dispense Refill    aspirin (ASPIRIN CHILDRENS) 81 MG chewable tablet Take 2 tablets by mouth daily 60 tablet 5    Prenatal Multivit-Min-Fe-FA (PRENATAL/IRON) TABS Take 1 tablet by mouth daily      ferrous sulfate (IRON 325) 325 (65 Fe) MG tablet Take 1 tablet by mouth every other day (Patient not taking: Reported on 1/31/2023) 180 tablet 3    ferrous sulfate (IRON 325) 325 (65 Fe) MG tablet Take 1 tablet by mouth 2 times daily 60 tablet 1     No current facility-administered medications for this visit. ALLERGIES:  Patient has no known allergies. Reviewed global and practice OB care including nausea measures, nutrition, activities, warning signs, and contact information.  Offered cell free DNA screen,NT echo and WIC .    `--------------------------------------------------------------------------  Genetic Screening/Teratology Counseling  (Include patient, FOB or anyone in either family)    1) Patient's age 28 years or > at [place]: No  2) Thalassemia (Mediterranean, ): No  3) Neural Tube Defect:   No  4) Congenital heart defect:   Yes Pt has niece with heart murmur that resolved on it's own within a few months  5) Trisomy (e.g. 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   15) Maternal metabolic disorder (DM, PKU): Yes GDM-1 first pregnancy  12) Child with birth defect not listed:  No  17) Recurrent pregnancy loss/stillbirth: No  18) Medications, supplements/illicit or   Recreational drugs/alcohol since LMP: No   List: none  19) Any other:   none    Comments/Counseling:   -------------------------------------------------------------------------  Infection History:    1) Live with someone with TB/exposed to TB: No  2) Patient/partner has h/o genital herpes: No  3) Rash/viral illness since LMP:  No  4) History of STD:    No  5) Other: No  -------------------------------------------------------------------------     Check list reviewed with New OB patient:    [facility]  -Delivery only at [facility]  -Several providers in practice and only doctors do deliveries  -May reach practice via [address] or phone. In Hand Guides messages are only answered M-Fri when office is open.      Testing  -Genetic testing (NIPT, AFP and/or referral to [facility])  -Testing per ACOG guidelines that are needed for any pregnancy  -Testing may be added according to your needs or if you become high risk  -Normal pregnancy US, one dating and one 20 week anatomy scan  -referral to [facility] each patient 20 week Ultrasound only    Appts:  -q 4 wks until your 28 wks  -@ 28wk q2wks  -@ 36wks q week  -this changes if you have increased risk factors    Diet:  -Nothing unpasteurized  -Lunch meats need to be steamed or heated  -Meats need to be thoroughly cooked, nothing pink or bldg  -Caffeine MAX per ACOG 16 oz/per day  -Artifical sweetener, limit no confirmed abnormal findings with development of fetus   -Fish, detailed list provided in OB packet, avoid large fish and only so many oz per week  -If you are vegan or vegetarian. OB pt needs 60 gm protein per day. -Caution with dehydration may cause cramping.      Exercise,Traveling and safety  -No sit ups after 14 weeks  -HR needs to be <160  -No heavy squatting  -nothing where you can fall and hurt yourself  (your center of gravity is not same when pregnant)  -Ask your provider if it's safe for you to fly  -long travel need to get up and walk around after 2 hours  -wear seat belt below gravid abd  -good support socks while traveling to aid with circulation    Advise  -Review need for vaccines in pregnancy COVID, FLU and T-dap,   -No sauna's or hot tubs   -Bug spray, nothing with Deet in it  -Seeing dentist once per pregnancy, any infection can cause  labor, will need note for dentist

## 2023-02-02 ENCOUNTER — TELEPHONE (OUTPATIENT)
Dept: OBGYN CLINIC | Age: 23
End: 2023-02-02

## 2023-02-02 DIAGNOSIS — I10 CHRONIC HYPERTENSION: ICD-10-CM

## 2023-02-02 DIAGNOSIS — I45.6 WPW (WOLFF-PARKINSON-WHITE SYNDROME): ICD-10-CM

## 2023-02-02 DIAGNOSIS — Z3A.21 21 WEEKS GESTATION OF PREGNANCY: Primary | ICD-10-CM

## 2023-02-02 DIAGNOSIS — O99.012 ANEMIA DURING PREGNANCY IN SECOND TRIMESTER: ICD-10-CM

## 2023-02-02 DIAGNOSIS — O99.012 ANEMIA AFFECTING PREGNANCY IN SECOND TRIMESTER: ICD-10-CM

## 2023-02-02 NOTE — TELEPHONE ENCOUNTER
21.5 Consulted with  and iron infusions to be ordered for OB. Notified pt of recent lab results and iron infusion recommended. Pt agreeable to go to  infusion center. Explained that some of her visits need to be moved to change in providers template. Pt agreeable to having appts changed.

## 2023-02-07 PROBLEM — O99.019 ANEMIA AFFECTING PREGNANCY: Status: ACTIVE | Noted: 2023-02-07

## 2023-02-07 RX ORDER — ALBUTEROL SULFATE 90 UG/1
4 AEROSOL, METERED RESPIRATORY (INHALATION) PRN
OUTPATIENT
Start: 2023-02-07

## 2023-02-07 RX ORDER — SODIUM CHLORIDE 9 MG/ML
INJECTION, SOLUTION INTRAVENOUS CONTINUOUS
OUTPATIENT
Start: 2023-02-07

## 2023-02-07 RX ORDER — ONDANSETRON 2 MG/ML
8 INJECTION INTRAMUSCULAR; INTRAVENOUS
OUTPATIENT
Start: 2023-02-07

## 2023-02-07 RX ORDER — HEPARIN SODIUM (PORCINE) LOCK FLUSH IV SOLN 100 UNIT/ML 100 UNIT/ML
500 SOLUTION INTRAVENOUS PRN
OUTPATIENT
Start: 2023-02-07

## 2023-02-07 RX ORDER — DIPHENHYDRAMINE HYDROCHLORIDE 50 MG/ML
50 INJECTION INTRAMUSCULAR; INTRAVENOUS
OUTPATIENT
Start: 2023-02-07

## 2023-02-07 RX ORDER — SODIUM CHLORIDE 9 MG/ML
5-250 INJECTION, SOLUTION INTRAVENOUS PRN
Status: CANCELLED | OUTPATIENT
Start: 2023-02-07

## 2023-02-07 RX ORDER — EPINEPHRINE 1 MG/ML
0.3 INJECTION, SOLUTION, CONCENTRATE INTRAVENOUS PRN
OUTPATIENT
Start: 2023-02-07

## 2023-02-07 RX ORDER — SODIUM CHLORIDE 0.9 % (FLUSH) 0.9 %
5-40 SYRINGE (ML) INJECTION PRN
OUTPATIENT
Start: 2023-02-07

## 2023-02-07 RX ORDER — ACETAMINOPHEN 325 MG/1
650 TABLET ORAL
OUTPATIENT
Start: 2023-02-07

## 2023-02-09 ENCOUNTER — ROUTINE PRENATAL (OUTPATIENT)
Dept: OBGYN CLINIC | Age: 23
End: 2023-02-09
Payer: COMMERCIAL

## 2023-02-09 VITALS
DIASTOLIC BLOOD PRESSURE: 60 MMHG | HEART RATE: 92 BPM | SYSTOLIC BLOOD PRESSURE: 104 MMHG | BODY MASS INDEX: 21.8 KG/M2 | WEIGHT: 127 LBS

## 2023-02-09 DIAGNOSIS — I45.6 WPW (WOLFF-PARKINSON-WHITE SYNDROME): ICD-10-CM

## 2023-02-09 DIAGNOSIS — O10.919 CHRONIC HYPERTENSION AFFECTING PREGNANCY: ICD-10-CM

## 2023-02-09 DIAGNOSIS — O09.899 SHORT INTERVAL BETWEEN PREGNANCIES COMPLICATING PREGNANCY, ANTEPARTUM: ICD-10-CM

## 2023-02-09 DIAGNOSIS — O09.92 SUPERVISION OF HIGH RISK PREGNANCY IN SECOND TRIMESTER: ICD-10-CM

## 2023-02-09 DIAGNOSIS — O99.012 ANEMIA AFFECTING PREGNANCY IN SECOND TRIMESTER: ICD-10-CM

## 2023-02-09 DIAGNOSIS — Z3A.22 22 WEEKS GESTATION OF PREGNANCY: Primary | ICD-10-CM

## 2023-02-09 PROCEDURE — 3078F DIAST BP <80 MM HG: CPT | Performed by: STUDENT IN AN ORGANIZED HEALTH CARE EDUCATION/TRAINING PROGRAM

## 2023-02-09 PROCEDURE — 99213 OFFICE O/P EST LOW 20 MIN: CPT | Performed by: STUDENT IN AN ORGANIZED HEALTH CARE EDUCATION/TRAINING PROGRAM

## 2023-02-09 PROCEDURE — G8427 DOCREV CUR MEDS BY ELIG CLIN: HCPCS | Performed by: STUDENT IN AN ORGANIZED HEALTH CARE EDUCATION/TRAINING PROGRAM

## 2023-02-09 PROCEDURE — G8420 CALC BMI NORM PARAMETERS: HCPCS | Performed by: STUDENT IN AN ORGANIZED HEALTH CARE EDUCATION/TRAINING PROGRAM

## 2023-02-09 PROCEDURE — 3074F SYST BP LT 130 MM HG: CPT | Performed by: STUDENT IN AN ORGANIZED HEALTH CARE EDUCATION/TRAINING PROGRAM

## 2023-02-09 PROCEDURE — 1036F TOBACCO NON-USER: CPT | Performed by: STUDENT IN AN ORGANIZED HEALTH CARE EDUCATION/TRAINING PROGRAM

## 2023-02-09 PROCEDURE — G8484 FLU IMMUNIZE NO ADMIN: HCPCS | Performed by: STUDENT IN AN ORGANIZED HEALTH CARE EDUCATION/TRAINING PROGRAM

## 2023-02-09 NOTE — PROGRESS NOTES
Prenatal Visit    Beni Posada is a 25 y.o. female  at 20w7d    The patient was seen and evaluated. Reports positive fetal movements. She denies headache, vision changes, RUQ pain, contractions, vaginal bleeding and leakage of fluid. The patient declined the influenza vaccine this year. The problem list reflects the active issues addressed during today's visit    Vitals:    BP: 104/60  Weight: 127 lb (57.6 kg)  Heart Rate: 92  Fundal Height (cm): 22 cm  Fetal HR: 152  Movement: Present     Labs: The patient is RH +, Rhogam not indicated  ABO/Rh   Date Value Ref Range Status   2023 A POSITIVE  Final         Assessment & Plan:  Beni Posada is a 25 y.o. female  at 20w7d   - NIPT low risk   - A single marker MSAFP was reviewed and found to be normal.    - The patients anatomy ultrasound has been ordered and reviewed with patient.    - Next [facility] appointment 2/15/23   - The ACIP recommended pregnant patients be included in phase 1C of vaccine distribution. This decision is supported by [facility] and ACOG. As of 2021, there have been over 30,000 pregnant patients included in the V-safe post COVID vaccination safety . Most (73%) reports to VAERS among pregnant women involved non-pregnancyspecific adverse events (e.g., local and systemic reactions). Miscarriage was the most frequently reported pregnancy-specific adverse event to VAERS; numbers are within the known background rates based on presumed COVID-19 vaccine doses administered to pregnant women. No unexpected pregnancy or infant outcomes have been observed related to  COVID-19 vaccination during pregnancy.  Recommended patient proceed with vaccination.    - Venofer infusions ordered due to anemia          Patient Active Problem List    Diagnosis Date Noted    Hx GDMA1 2022     Priority: High     Early 1 hr GTT ordered      Short Interconception 2023     Priority: Medium    Anemia affecting pregnancy 2023 Priority: Medium    cHTN (no meds) 01/11/2023     Priority: Medium     Baby ASA ordered 1/11/23   Pre-E labs WNL P:C 0.09      Iron deficiency anemia due to chronic blood loss 05/27/2022     Priority: Medium    cHTN (no meds) w/ CRISTOFER w/o SF 02/23/2022    Martha-Parkinson-White 12/09/2016     s/p ablation   Will need fetal ECHO      Orthostatic Hypotension      GERD 11/25/2014     Return in about 4 weeks (around 3/9/2023) for DO [facility]   2/9/2023, 1:42 PM

## 2023-02-15 ENCOUNTER — ROUTINE PRENATAL (OUTPATIENT)
Dept: PERINATAL CARE | Age: 23
End: 2023-02-15
Payer: COMMERCIAL

## 2023-02-15 VITALS
HEIGHT: 64 IN | DIASTOLIC BLOOD PRESSURE: 61 MMHG | TEMPERATURE: 97.3 F | WEIGHT: 128 LBS | RESPIRATION RATE: 16 BRPM | HEART RATE: 84 BPM | SYSTOLIC BLOOD PRESSURE: 108 MMHG | BODY MASS INDEX: 21.85 KG/M2

## 2023-02-15 DIAGNOSIS — O16.2 HYPERTENSION AFFECTING PREGNANCY IN SECOND TRIMESTER: ICD-10-CM

## 2023-02-15 DIAGNOSIS — O44.40 LOW IMPLANTATION OF PLACENTA WITHOUT HEMORRHAGE: ICD-10-CM

## 2023-02-15 DIAGNOSIS — O43.112 CIRCUMVALLATE PLACENTA IN SECOND TRIMESTER: ICD-10-CM

## 2023-02-15 DIAGNOSIS — O09.292 HISTORY OF PRE-ECLAMPSIA IN PRIOR PREGNANCY, CURRENTLY PREGNANT IN SECOND TRIMESTER: ICD-10-CM

## 2023-02-15 DIAGNOSIS — O09.899 SHORT INTERVAL BETWEEN PREGNANCIES COMPLICATING PREGNANCY, ANTEPARTUM: ICD-10-CM

## 2023-02-15 DIAGNOSIS — Z36.86 ENCOUNTER FOR SCREENING FOR RISK OF PRE-TERM LABOR: ICD-10-CM

## 2023-02-15 DIAGNOSIS — O99.412 MATERNAL CARDIOVASCULAR DISEASE AFFECTING PREGNANCY IN SECOND TRIMESTER: Primary | ICD-10-CM

## 2023-02-15 PROBLEM — O43.119 CIRCUMVALLATE PLACENTA: Status: ACTIVE | Noted: 2023-02-15

## 2023-02-15 LAB
ABDOMINAL CIRCUMFERENCE: NORMAL
ABDOMINAL CIRCUMFERENCE: NORMAL
BIPARIETAL DIAMETER: NORMAL
BIPARIETAL DIAMETER: NORMAL
ESTIMATED FETAL WEIGHT: NORMAL
ESTIMATED FETAL WEIGHT: NORMAL
FEMORAL DIAMETER: NORMAL
FEMORAL DIAMETER: NORMAL
HC/AC: NORMAL
HC/AC: NORMAL
HEAD CIRCUMFERENCE: NORMAL
HEAD CIRCUMFERENCE: NORMAL

## 2023-02-15 PROCEDURE — 76825 ECHO EXAM OF FETAL HEART: CPT | Performed by: OBSTETRICS & GYNECOLOGY

## 2023-02-15 PROCEDURE — 99999 PR OFFICE/OUTPT VISIT,PROCEDURE ONLY: CPT | Performed by: OBSTETRICS & GYNECOLOGY

## 2023-02-15 PROCEDURE — 76811 OB US DETAILED SNGL FETUS: CPT | Performed by: OBSTETRICS & GYNECOLOGY

## 2023-02-15 PROCEDURE — 76817 TRANSVAGINAL US OBSTETRIC: CPT | Performed by: OBSTETRICS & GYNECOLOGY

## 2023-02-15 PROCEDURE — 93325 DOPPLER ECHO COLOR FLOW MAPG: CPT | Performed by: OBSTETRICS & GYNECOLOGY

## 2023-02-15 PROCEDURE — 76827 ECHO EXAM OF FETAL HEART: CPT | Performed by: OBSTETRICS & GYNECOLOGY

## 2023-02-15 NOTE — PROGRESS NOTES
Obstetric/Gynecology Maternal Fetal Medicine Resident Note    Updated patient regarding US findings of low lying and circumvallate placenta. She declines formal consult with MFM attending physician at this time.      Desire Pollard DO  OBPASCUAL Resident, PGY2  [facility]  2/15/2023, 2:05 PM

## 2023-02-15 NOTE — PROGRESS NOTES
Patient declines a Maternal-Fetal Medicine complete physician consultation today regarding the fetal and/or maternal medical/obstetrical complications/co-morbidities of pregnancy. Maternal-Fetal Medicine (MFM) attending physician will defer all management for these medical/obstetrical complications of pregnancy to the primary attending obstetrical physician/provider, as a result. Therefore, only an ultrasound evaluation was completed today in the MFM office. Please refer to [address] resident progress note in EPIC.

## 2023-03-15 ENCOUNTER — ROUTINE PRENATAL (OUTPATIENT)
Dept: PERINATAL CARE | Age: 23
End: 2023-03-15
Payer: COMMERCIAL

## 2023-03-15 VITALS
DIASTOLIC BLOOD PRESSURE: 60 MMHG | RESPIRATION RATE: 16 BRPM | BODY MASS INDEX: 22.88 KG/M2 | HEART RATE: 80 BPM | HEIGHT: 64 IN | SYSTOLIC BLOOD PRESSURE: 98 MMHG | TEMPERATURE: 97.6 F | WEIGHT: 134 LBS

## 2023-03-15 DIAGNOSIS — Z13.89 ENCOUNTER FOR ROUTINE SCREENING FOR MALFORMATION USING ULTRASONICS: ICD-10-CM

## 2023-03-15 DIAGNOSIS — O09.293 HISTORY OF PRE-ECLAMPSIA IN PRIOR PREGNANCY, CURRENTLY PREGNANT IN THIRD TRIMESTER: ICD-10-CM

## 2023-03-15 DIAGNOSIS — Z3A.27 27 WEEKS GESTATION OF PREGNANCY: ICD-10-CM

## 2023-03-15 DIAGNOSIS — O09.293 HISTORY OF INTRAUTERINE GROWTH RESTRICTION IN PRIOR PREGNANCY, CURRENTLY PREGNANT, THIRD TRIMESTER: ICD-10-CM

## 2023-03-15 DIAGNOSIS — O43.113 CIRCUMVALLATE PLACENTA IN THIRD TRIMESTER: ICD-10-CM

## 2023-03-15 DIAGNOSIS — O09.899 SHORT INTERVAL BETWEEN PREGNANCIES COMPLICATING PREGNANCY, ANTEPARTUM: ICD-10-CM

## 2023-03-15 DIAGNOSIS — O16.3 HYPERTENSION AFFECTING PREGNANCY IN THIRD TRIMESTER: Primary | ICD-10-CM

## 2023-03-15 DIAGNOSIS — O44.40 LOW IMPLANTATION OF PLACENTA WITHOUT HEMORRHAGE: ICD-10-CM

## 2023-03-15 DIAGNOSIS — O99.413 MATERNAL CARDIOVASCULAR DISEASE AFFECTING PREGNANCY IN THIRD TRIMESTER: ICD-10-CM

## 2023-03-15 DIAGNOSIS — Z36.4 ULTRASOUND FOR ANTENATAL SCREENING FOR FETAL GROWTH RESTRICTION: ICD-10-CM

## 2023-03-15 PROCEDURE — 76817 TRANSVAGINAL US OBSTETRIC: CPT | Performed by: OBSTETRICS & GYNECOLOGY

## 2023-03-15 PROCEDURE — 76819 FETAL BIOPHYS PROFIL W/O NST: CPT | Performed by: OBSTETRICS & GYNECOLOGY

## 2023-03-15 PROCEDURE — 76816 OB US FOLLOW-UP PER FETUS: CPT | Performed by: OBSTETRICS & GYNECOLOGY

## 2023-03-15 PROCEDURE — 99999 PR OFFICE/OUTPT VISIT,PROCEDURE ONLY: CPT | Performed by: OBSTETRICS & GYNECOLOGY

## 2023-03-15 PROCEDURE — 76820 UMBILICAL ARTERY ECHO: CPT | Performed by: OBSTETRICS & GYNECOLOGY

## 2023-04-07 ENCOUNTER — HOSPITAL ENCOUNTER (OUTPATIENT)
Dept: ONCOLOGY | Age: 23
Discharge: HOME | End: 2023-04-07
Attending: STUDENT IN AN ORGANIZED HEALTH CARE EDUCATION/TRAINING PROGRAM | Admitting: STUDENT IN AN ORGANIZED HEALTH CARE EDUCATION/TRAINING PROGRAM
Payer: COMMERCIAL

## 2023-04-07 VITALS
TEMPERATURE: 97.7 F | SYSTOLIC BLOOD PRESSURE: 103 MMHG | OXYGEN SATURATION: 100 % | DIASTOLIC BLOOD PRESSURE: 71 MMHG | RESPIRATION RATE: 16 BRPM | HEART RATE: 83 BPM

## 2023-04-07 DIAGNOSIS — O99.011 ANEMIA AFFECTING PREGNANCY IN FIRST TRIMESTER: Primary | ICD-10-CM

## 2023-04-07 PROBLEM — D50.9 MATERNAL IRON DEFICIENCY ANEMIA AFFECTING PREGNANCY IN THIRD TRIMESTER, ANTEPARTUM: Status: ACTIVE | Noted: 2023-04-07

## 2023-04-07 PROBLEM — O99.013 MATERNAL IRON DEFICIENCY ANEMIA AFFECTING PREGNANCY IN THIRD TRIMESTER, ANTEPARTUM: Status: ACTIVE | Noted: 2023-04-07

## 2023-04-07 PROCEDURE — 96365 THER/PROPH/DIAG IV INF INIT: CPT

## 2023-04-07 PROCEDURE — 2580000003 HC RX 258: Performed by: STUDENT IN AN ORGANIZED HEALTH CARE EDUCATION/TRAINING PROGRAM

## 2023-04-07 PROCEDURE — 6360000002 HC RX W HCPCS: Performed by: STUDENT IN AN ORGANIZED HEALTH CARE EDUCATION/TRAINING PROGRAM

## 2023-04-07 RX ORDER — ACETAMINOPHEN 325 MG/1
650 TABLET ORAL
OUTPATIENT
Start: 2023-04-07

## 2023-04-07 RX ORDER — HEPARIN SODIUM (PORCINE) LOCK FLUSH IV SOLN 100 UNIT/ML 100 UNIT/ML
500 SOLUTION INTRAVENOUS PRN
OUTPATIENT
Start: 2023-04-07

## 2023-04-07 RX ORDER — ALBUTEROL SULFATE 90 UG/1
4 AEROSOL, METERED RESPIRATORY (INHALATION) PRN
OUTPATIENT
Start: 2023-04-07

## 2023-04-07 RX ORDER — ONDANSETRON 2 MG/ML
8 INJECTION INTRAMUSCULAR; INTRAVENOUS
OUTPATIENT
Start: 2023-04-07

## 2023-04-07 RX ORDER — SODIUM CHLORIDE 9 MG/ML
5-250 INJECTION, SOLUTION INTRAVENOUS PRN
OUTPATIENT
Start: 2023-04-07

## 2023-04-07 RX ORDER — SODIUM CHLORIDE 9 MG/ML
INJECTION, SOLUTION INTRAVENOUS CONTINUOUS
OUTPATIENT
Start: 2023-04-07

## 2023-04-07 RX ORDER — SODIUM CHLORIDE 0.9 % (FLUSH) 0.9 %
5-40 SYRINGE (ML) INJECTION PRN
OUTPATIENT
Start: 2023-04-07

## 2023-04-07 RX ORDER — DIPHENHYDRAMINE HYDROCHLORIDE 50 MG/ML
50 INJECTION INTRAMUSCULAR; INTRAVENOUS
OUTPATIENT
Start: 2023-04-07

## 2023-04-07 RX ORDER — EPINEPHRINE 1 MG/ML
0.3 INJECTION, SOLUTION, CONCENTRATE INTRAVENOUS PRN
OUTPATIENT
Start: 2023-04-07

## 2023-04-07 RX ORDER — SODIUM CHLORIDE 9 MG/ML
5-250 INJECTION, SOLUTION INTRAVENOUS PRN
Status: DISCONTINUED | OUTPATIENT
Start: 2023-04-07 | End: 2023-04-07 | Stop reason: HOSPADM

## 2023-04-07 RX ADMIN — SODIUM CHLORIDE 200 ML/HR: 9 INJECTION, SOLUTION INTRAVENOUS at 13:34

## 2023-04-07 RX ADMIN — IRON SUCROSE 100 MG: 20 INJECTION, SOLUTION INTRAVENOUS at 13:36

## 2023-04-07 NOTE — PLAN OF CARE
1252 Patient arrived for infusion. VSS as charted. IV placed without complication. Patient tolerated infusion well. Patient left infusion center with all belongings and steady gate.   [facility]

## 2023-04-14 ENCOUNTER — HOSPITAL ENCOUNTER (OUTPATIENT)
Dept: ONCOLOGY | Age: 23
Discharge: HOME | End: 2023-04-14
Attending: STUDENT IN AN ORGANIZED HEALTH CARE EDUCATION/TRAINING PROGRAM | Admitting: STUDENT IN AN ORGANIZED HEALTH CARE EDUCATION/TRAINING PROGRAM
Payer: COMMERCIAL

## 2023-04-14 VITALS
DIASTOLIC BLOOD PRESSURE: 62 MMHG | SYSTOLIC BLOOD PRESSURE: 104 MMHG | TEMPERATURE: 97.9 F | OXYGEN SATURATION: 97 % | HEART RATE: 94 BPM | RESPIRATION RATE: 16 BRPM

## 2023-04-14 DIAGNOSIS — O99.011 ANEMIA AFFECTING PREGNANCY IN FIRST TRIMESTER: Primary | ICD-10-CM

## 2023-04-14 PROBLEM — O99.019 ANTEPARTUM ANEMIA COMPLICATING PREGNANCY: Status: ACTIVE | Noted: 2023-04-14

## 2023-04-14 PROCEDURE — 2580000003 HC RX 258: Performed by: STUDENT IN AN ORGANIZED HEALTH CARE EDUCATION/TRAINING PROGRAM

## 2023-04-14 PROCEDURE — 6360000002 HC RX W HCPCS: Performed by: STUDENT IN AN ORGANIZED HEALTH CARE EDUCATION/TRAINING PROGRAM

## 2023-04-14 PROCEDURE — 96365 THER/PROPH/DIAG IV INF INIT: CPT

## 2023-04-14 RX ORDER — ONDANSETRON 2 MG/ML
8 INJECTION INTRAMUSCULAR; INTRAVENOUS
OUTPATIENT
Start: 2023-04-14

## 2023-04-14 RX ORDER — ACETAMINOPHEN 325 MG/1
650 TABLET ORAL
OUTPATIENT
Start: 2023-04-14

## 2023-04-14 RX ORDER — ALBUTEROL SULFATE 90 UG/1
4 AEROSOL, METERED RESPIRATORY (INHALATION) PRN
OUTPATIENT
Start: 2023-04-14

## 2023-04-14 RX ORDER — HEPARIN SODIUM (PORCINE) LOCK FLUSH IV SOLN 100 UNIT/ML 100 UNIT/ML
500 SOLUTION INTRAVENOUS PRN
OUTPATIENT
Start: 2023-04-14

## 2023-04-14 RX ORDER — SODIUM CHLORIDE 0.9 % (FLUSH) 0.9 %
5-40 SYRINGE (ML) INJECTION PRN
OUTPATIENT
Start: 2023-04-14

## 2023-04-14 RX ORDER — SODIUM CHLORIDE 9 MG/ML
5-250 INJECTION, SOLUTION INTRAVENOUS PRN
Status: CANCELLED | OUTPATIENT
Start: 2023-04-14

## 2023-04-14 RX ORDER — SODIUM CHLORIDE 9 MG/ML
INJECTION, SOLUTION INTRAVENOUS CONTINUOUS
Status: DISCONTINUED | OUTPATIENT
Start: 2023-04-14 | End: 2023-04-14 | Stop reason: HOSPADM

## 2023-04-14 RX ORDER — SODIUM CHLORIDE 9 MG/ML
INJECTION, SOLUTION INTRAVENOUS CONTINUOUS
OUTPATIENT
Start: 2023-04-14

## 2023-04-14 RX ORDER — EPINEPHRINE 1 MG/ML
0.3 INJECTION, SOLUTION, CONCENTRATE INTRAVENOUS PRN
OUTPATIENT
Start: 2023-04-14

## 2023-04-14 RX ORDER — DIPHENHYDRAMINE HYDROCHLORIDE 50 MG/ML
50 INJECTION INTRAMUSCULAR; INTRAVENOUS
OUTPATIENT
Start: 2023-04-14

## 2023-04-14 RX ADMIN — IRON SUCROSE 100 MG: 20 INJECTION, SOLUTION INTRAVENOUS at 13:31

## 2023-04-14 RX ADMIN — SODIUM CHLORIDE: 9 INJECTION, SOLUTION INTRAVENOUS at 13:31

## 2023-04-14 NOTE — PLAN OF CARE
1253 Patient arrived for infusion. VSS as charted. IV placed without complication. Patient tolerated infusion well. Patient left infusion center with all belongings and steady gate.   [facility]

## 2023-04-28 ENCOUNTER — HOSPITAL ENCOUNTER (OUTPATIENT)
Age: 23
Setting detail: SPECIMEN
Discharge: HOME | End: 2023-04-28

## 2023-04-28 ENCOUNTER — ROUTINE PRENATAL (OUTPATIENT)
Dept: OBGYN CLINIC | Age: 23
End: 2023-04-28
Payer: COMMERCIAL

## 2023-04-28 ENCOUNTER — HOSPITAL ENCOUNTER (OUTPATIENT)
Dept: ONCOLOGY | Age: 23
Discharge: HOME | End: 2023-04-28
Attending: STUDENT IN AN ORGANIZED HEALTH CARE EDUCATION/TRAINING PROGRAM | Admitting: STUDENT IN AN ORGANIZED HEALTH CARE EDUCATION/TRAINING PROGRAM
Payer: COMMERCIAL

## 2023-04-28 VITALS
WEIGHT: 134 LBS | HEART RATE: 82 BPM | DIASTOLIC BLOOD PRESSURE: 61 MMHG | BODY MASS INDEX: 23 KG/M2 | SYSTOLIC BLOOD PRESSURE: 97 MMHG

## 2023-04-28 VITALS
RESPIRATION RATE: 16 BRPM | OXYGEN SATURATION: 97 % | TEMPERATURE: 97.9 F | DIASTOLIC BLOOD PRESSURE: 62 MMHG | SYSTOLIC BLOOD PRESSURE: 99 MMHG | HEART RATE: 76 BPM

## 2023-04-28 DIAGNOSIS — O10.919 CHRONIC HYPERTENSION AFFECTING PREGNANCY: ICD-10-CM

## 2023-04-28 DIAGNOSIS — O99.019 ANEMIA AFFECTING PREGNANCY, ANTEPARTUM: ICD-10-CM

## 2023-04-28 DIAGNOSIS — R10.2 PELVIC CRAMPING IN ANTEPARTUM PERIOD: ICD-10-CM

## 2023-04-28 DIAGNOSIS — Z3A.33 33 WEEKS GESTATION OF PREGNANCY: Primary | ICD-10-CM

## 2023-04-28 DIAGNOSIS — O26.899 PELVIC CRAMPING IN ANTEPARTUM PERIOD: ICD-10-CM

## 2023-04-28 DIAGNOSIS — O99.011 ANEMIA AFFECTING PREGNANCY IN FIRST TRIMESTER: Primary | ICD-10-CM

## 2023-04-28 DIAGNOSIS — O44.40 LOW-LYING PLACENTA: ICD-10-CM

## 2023-04-28 DIAGNOSIS — I10 CHRONIC HYPERTENSION: ICD-10-CM

## 2023-04-28 LAB
BILIRUBIN URINE: NEGATIVE
CANDIDA SPECIES, DNA PROBE: NEGATIVE
CASTS UA: NORMAL /LPF (ref 0–8)
COLOR: YELLOW
EPITHELIAL CELLS UA: NORMAL /HPF (ref 0–5)
GARDNERELLA VAGINALIS, DNA PROBE: POSITIVE
GLUCOSE UR STRIP.AUTO-MCNC: NEGATIVE MG/DL
KETONES UR STRIP.AUTO-MCNC: ABNORMAL MG/DL
LEUKOCYTE ESTERASE UR QL STRIP.AUTO: ABNORMAL
NITRITE UR QL STRIP.AUTO: NEGATIVE
PROT UR STRIP.AUTO-MCNC: 7 MG/DL (ref 5–8)
PROT UR STRIP.AUTO-MCNC: NEGATIVE MG/DL
RBC CLUMPS #/AREA URNS AUTO: NORMAL /HPF (ref 0–4)
SOURCE: ABNORMAL
SPECIFIC GRAVITY UA: 1.02 (ref 1–1.03)
TRICHOMONAS VAGINALIS DNA: NEGATIVE
TURBIDITY: ABNORMAL
URINE HGB: NEGATIVE
UROBILINOGEN, URINE: NORMAL
WBC UA: NORMAL /HPF (ref 0–5)

## 2023-04-28 PROCEDURE — 6360000002 HC RX W HCPCS: Performed by: STUDENT IN AN ORGANIZED HEALTH CARE EDUCATION/TRAINING PROGRAM

## 2023-04-28 PROCEDURE — 59025 FETAL NON-STRESS TEST: CPT

## 2023-04-28 PROCEDURE — 96365 THER/PROPH/DIAG IV INF INIT: CPT

## 2023-04-28 PROCEDURE — G8420 CALC BMI NORM PARAMETERS: HCPCS

## 2023-04-28 PROCEDURE — 3078F DIAST BP <80 MM HG: CPT

## 2023-04-28 PROCEDURE — G8428 CUR MEDS NOT DOCUMENT: HCPCS

## 2023-04-28 PROCEDURE — 3074F SYST BP LT 130 MM HG: CPT

## 2023-04-28 PROCEDURE — 99214 OFFICE O/P EST MOD 30 MIN: CPT

## 2023-04-28 PROCEDURE — 2580000003 HC RX 258: Performed by: STUDENT IN AN ORGANIZED HEALTH CARE EDUCATION/TRAINING PROGRAM

## 2023-04-28 PROCEDURE — 1036F TOBACCO NON-USER: CPT

## 2023-04-28 RX ORDER — SODIUM CHLORIDE 9 MG/ML
INJECTION, SOLUTION INTRAVENOUS CONTINUOUS
Status: DISCONTINUED | OUTPATIENT
Start: 2023-04-28 | End: 2023-04-28 | Stop reason: HOSPADM

## 2023-04-28 RX ORDER — ONDANSETRON 2 MG/ML
8 INJECTION INTRAMUSCULAR; INTRAVENOUS
OUTPATIENT
Start: 2023-04-28

## 2023-04-28 RX ORDER — HEPARIN SODIUM (PORCINE) LOCK FLUSH IV SOLN 100 UNIT/ML 100 UNIT/ML
500 SOLUTION INTRAVENOUS PRN
OUTPATIENT
Start: 2023-04-28

## 2023-04-28 RX ORDER — ACETAMINOPHEN 325 MG/1
650 TABLET ORAL
OUTPATIENT
Start: 2023-04-28

## 2023-04-28 RX ORDER — EPINEPHRINE 1 MG/ML
0.3 INJECTION, SOLUTION, CONCENTRATE INTRAVENOUS PRN
OUTPATIENT
Start: 2023-04-28

## 2023-04-28 RX ORDER — SODIUM CHLORIDE 9 MG/ML
5-250 INJECTION, SOLUTION INTRAVENOUS PRN
OUTPATIENT
Start: 2023-04-28

## 2023-04-28 RX ORDER — BREAST PUMP
1 EACH MISCELLANEOUS
Qty: 1 EACH | Refills: 0 | Status: SHIPPED | OUTPATIENT
Start: 2023-04-28

## 2023-04-28 RX ORDER — DIPHENHYDRAMINE HYDROCHLORIDE 50 MG/ML
50 INJECTION INTRAMUSCULAR; INTRAVENOUS
OUTPATIENT
Start: 2023-04-28

## 2023-04-28 RX ORDER — SODIUM CHLORIDE 9 MG/ML
INJECTION, SOLUTION INTRAVENOUS CONTINUOUS
Status: CANCELLED | OUTPATIENT
Start: 2023-04-28

## 2023-04-28 RX ORDER — SODIUM CHLORIDE 0.9 % (FLUSH) 0.9 %
5-40 SYRINGE (ML) INJECTION PRN
OUTPATIENT
Start: 2023-04-28

## 2023-04-28 RX ORDER — ALBUTEROL SULFATE 90 UG/1
4 AEROSOL, METERED RESPIRATORY (INHALATION) PRN
OUTPATIENT
Start: 2023-04-28

## 2023-04-28 RX ORDER — SODIUM CHLORIDE 9 MG/ML
INJECTION, SOLUTION INTRAVENOUS CONTINUOUS
OUTPATIENT
Start: 2023-04-28

## 2023-04-28 RX ADMIN — IRON SUCROSE 100 MG: 20 INJECTION, SOLUTION INTRAVENOUS at 13:20

## 2023-04-28 RX ADMIN — SODIUM CHLORIDE: 9 INJECTION, SOLUTION INTRAVENOUS at 13:19

## 2023-04-28 ASSESSMENT — ENCOUNTER SYMPTOMS
VOMITING: 0
CONSTIPATION: 0
NAUSEA: 0

## 2023-04-28 NOTE — PROGRESS NOTES
BP: 97/61   Site: Right Upper Arm   Position: Sitting   Cuff Size: Medium Adult   Pulse: 82   Weight: 134 lb (60.8 kg)      Physical Exam  Constitutional:       General: She is not in acute distress. Appearance: Normal appearance. She is well-groomed. She is not ill-appearing. Comments: Pleasant and interactive   Genitourinary:      Genitourinary Comments: Pelvic exam not performed today   Pulmonary:      Effort: Pulmonary effort is normal.   Psychiatric:         Attention and Perception: Attention normal.         Mood and Affect: Mood normal.         Speech: Speech normal.         Thought Content: Thought content normal.   Vitals reviewed. Assessment and Plan:   Diagnoses and all orders for this visit:    33 weeks gestation of pregnancy  -     10611 - [place] FETAL NON-STRESS TEST    cHTN (no meds)  -     80257 - [place] FETAL NON-STRESS TEST    Hx cHTN (no meds) w/ CRISTOFER w/o SF    Anemia affecting pregnancy, antepartum    Low-lying placenta    Pelvic cramping in antepartum period  -     Urinalysis; Future  -     Vaginitis DNA Probe; Future  -     Culture, Urine; Future    Other orders  -     Misc. Devices (BREAST PUMP) MISC; 1 each by Does not apply route every 3 hours as needed (empty breasts at least every 3 hours in postpartum period)       MFM recommendations: follow up at 32 weeks. .. pt will call to schedule. Uad recommended per report    Current medications: venofer, aspirin, pnv  Baby aspirin indications: history of preeclampsia, chtn  Genetic testing - nipt shows low risk  Anatomy scan completed MFM  Rhogam - not needed for A positive blood type  28 week labs - passed 1 hr gtt  PP contraception plan: desires nexplanon at 6 week pp appt  Infant feeding needs: breast pump rx provided today    Covid vaccine - declined     Educated on the importance of vaccines (TDAP, Flu, Covid) in pregnancy.   The [employer] of Obstetricians and Gynecologists (ACOG) and the Society for [facility]

## 2023-04-28 NOTE — PLAN OF CARE
1302 Patient arrived for infusion. VSS as charted. IV placed without complication. Patient tolerated infusion well. Patient left infusion center with all belongings and steady gate.   [address] [facility]

## 2023-04-29 RX ORDER — METRONIDAZOLE 7.5 MG/G
1 GEL VAGINAL NIGHTLY
Qty: 1 EACH | Refills: 0 | Status: SHIPPED | OUTPATIENT
Start: 2023-04-29 | End: 2023-05-06

## 2023-04-30 LAB
MICROORGANISM SPEC CULT: NORMAL
SPECIMEN DESCRIPTION: NORMAL

## 2023-05-01 PROBLEM — D50.0 IRON DEFICIENCY ANEMIA DUE TO CHRONIC BLOOD LOSS: Status: RESOLVED | Noted: 2022-05-27 | Resolved: 2023-05-01

## 2023-05-05 ENCOUNTER — HOSPITAL ENCOUNTER (OUTPATIENT)
Dept: ONCOLOGY | Age: 23
Discharge: HOME | End: 2023-05-05
Attending: STUDENT IN AN ORGANIZED HEALTH CARE EDUCATION/TRAINING PROGRAM | Admitting: STUDENT IN AN ORGANIZED HEALTH CARE EDUCATION/TRAINING PROGRAM
Payer: COMMERCIAL

## 2023-05-05 ENCOUNTER — ROUTINE PRENATAL (OUTPATIENT)
Dept: OBGYN CLINIC | Age: 23
End: 2023-05-05
Payer: COMMERCIAL

## 2023-05-05 VITALS
HEART RATE: 82 BPM | DIASTOLIC BLOOD PRESSURE: 64 MMHG | WEIGHT: 135 LBS | SYSTOLIC BLOOD PRESSURE: 105 MMHG | BODY MASS INDEX: 23.17 KG/M2

## 2023-05-05 VITALS
HEART RATE: 91 BPM | TEMPERATURE: 98.4 F | DIASTOLIC BLOOD PRESSURE: 63 MMHG | OXYGEN SATURATION: 100 % | RESPIRATION RATE: 16 BRPM | SYSTOLIC BLOOD PRESSURE: 108 MMHG

## 2023-05-05 DIAGNOSIS — O99.013 MATERNAL IRON DEFICIENCY ANEMIA AFFECTING PREGNANCY IN THIRD TRIMESTER, ANTEPARTUM: ICD-10-CM

## 2023-05-05 DIAGNOSIS — O99.011 ANEMIA AFFECTING PREGNANCY IN FIRST TRIMESTER: Primary | ICD-10-CM

## 2023-05-05 DIAGNOSIS — O10.919 CHRONIC HYPERTENSION AFFECTING PREGNANCY: Primary | ICD-10-CM

## 2023-05-05 DIAGNOSIS — Z3A.34 34 WEEKS GESTATION OF PREGNANCY: ICD-10-CM

## 2023-05-05 DIAGNOSIS — O09.93 SUPERVISION OF HIGH RISK PREGNANCY IN THIRD TRIMESTER: ICD-10-CM

## 2023-05-05 DIAGNOSIS — I45.6 WPW (WOLFF-PARKINSON-WHITE SYNDROME): ICD-10-CM

## 2023-05-05 DIAGNOSIS — D50.9 MATERNAL IRON DEFICIENCY ANEMIA AFFECTING PREGNANCY IN THIRD TRIMESTER, ANTEPARTUM: ICD-10-CM

## 2023-05-05 DIAGNOSIS — O09.899 SHORT INTERVAL BETWEEN PREGNANCIES COMPLICATING PREGNANCY, ANTEPARTUM: ICD-10-CM

## 2023-05-05 PROCEDURE — 2580000003 HC RX 258: Performed by: STUDENT IN AN ORGANIZED HEALTH CARE EDUCATION/TRAINING PROGRAM

## 2023-05-05 PROCEDURE — G8427 DOCREV CUR MEDS BY ELIG CLIN: HCPCS | Performed by: STUDENT IN AN ORGANIZED HEALTH CARE EDUCATION/TRAINING PROGRAM

## 2023-05-05 PROCEDURE — 1036F TOBACCO NON-USER: CPT | Performed by: STUDENT IN AN ORGANIZED HEALTH CARE EDUCATION/TRAINING PROGRAM

## 2023-05-05 PROCEDURE — 96365 THER/PROPH/DIAG IV INF INIT: CPT

## 2023-05-05 PROCEDURE — 3074F SYST BP LT 130 MM HG: CPT | Performed by: STUDENT IN AN ORGANIZED HEALTH CARE EDUCATION/TRAINING PROGRAM

## 2023-05-05 PROCEDURE — 6360000002 HC RX W HCPCS: Performed by: STUDENT IN AN ORGANIZED HEALTH CARE EDUCATION/TRAINING PROGRAM

## 2023-05-05 PROCEDURE — 59025 FETAL NON-STRESS TEST: CPT | Performed by: STUDENT IN AN ORGANIZED HEALTH CARE EDUCATION/TRAINING PROGRAM

## 2023-05-05 PROCEDURE — 99213 OFFICE O/P EST LOW 20 MIN: CPT | Performed by: STUDENT IN AN ORGANIZED HEALTH CARE EDUCATION/TRAINING PROGRAM

## 2023-05-05 PROCEDURE — 3078F DIAST BP <80 MM HG: CPT | Performed by: STUDENT IN AN ORGANIZED HEALTH CARE EDUCATION/TRAINING PROGRAM

## 2023-05-05 PROCEDURE — G8420 CALC BMI NORM PARAMETERS: HCPCS | Performed by: STUDENT IN AN ORGANIZED HEALTH CARE EDUCATION/TRAINING PROGRAM

## 2023-05-05 RX ORDER — EPINEPHRINE 1 MG/ML
0.3 INJECTION, SOLUTION, CONCENTRATE INTRAVENOUS PRN
OUTPATIENT
Start: 2023-05-05

## 2023-05-05 RX ORDER — ACETAMINOPHEN 325 MG/1
650 TABLET ORAL
OUTPATIENT
Start: 2023-05-05

## 2023-05-05 RX ORDER — DIPHENHYDRAMINE HYDROCHLORIDE 50 MG/ML
50 INJECTION INTRAMUSCULAR; INTRAVENOUS
OUTPATIENT
Start: 2023-05-05

## 2023-05-05 RX ORDER — HEPARIN SODIUM (PORCINE) LOCK FLUSH IV SOLN 100 UNIT/ML 100 UNIT/ML
500 SOLUTION INTRAVENOUS PRN
OUTPATIENT
Start: 2023-05-05

## 2023-05-05 RX ORDER — ONDANSETRON 2 MG/ML
8 INJECTION INTRAMUSCULAR; INTRAVENOUS
OUTPATIENT
Start: 2023-05-05

## 2023-05-05 RX ORDER — ALBUTEROL SULFATE 90 UG/1
4 AEROSOL, METERED RESPIRATORY (INHALATION) PRN
OUTPATIENT
Start: 2023-05-05

## 2023-05-05 RX ORDER — SODIUM CHLORIDE 9 MG/ML
INJECTION, SOLUTION INTRAVENOUS CONTINUOUS
Status: DISCONTINUED | OUTPATIENT
Start: 2023-05-05 | End: 2023-05-05 | Stop reason: HOSPADM

## 2023-05-05 RX ORDER — SODIUM CHLORIDE 9 MG/ML
INJECTION, SOLUTION INTRAVENOUS CONTINUOUS
OUTPATIENT
Start: 2023-05-05

## 2023-05-05 RX ORDER — SODIUM CHLORIDE 9 MG/ML
5-250 INJECTION, SOLUTION INTRAVENOUS PRN
OUTPATIENT
Start: 2023-05-05

## 2023-05-05 RX ORDER — SODIUM CHLORIDE 0.9 % (FLUSH) 0.9 %
5-40 SYRINGE (ML) INJECTION PRN
OUTPATIENT
Start: 2023-05-05

## 2023-05-05 RX ADMIN — IRON SUCROSE 100 MG: 20 INJECTION, SOLUTION INTRAVENOUS at 11:53

## 2023-05-05 RX ADMIN — SODIUM CHLORIDE: 9 INJECTION, SOLUTION INTRAVENOUS at 11:52

## 2023-05-05 NOTE — PLAN OF CARE
1127 Patient arrived for infusion. VSS as charted. IV placed without complication. Patient tolerated infusion well. Patient left infusion center with all belongings and steady gate.   [address] [place] [address] [place]

## 2023-05-05 NOTE — PROGRESS NOTES
Prenatal Visit    Tha Lala is a 25 y.o. female  at 34w7d    The patient was seen and evaluated. Reports positive fetal movements. She denies headache, vision changes, RUQ pain, contractions, vaginal bleeding and leakage of fluid. The patient declined the T-Dap Vaccine (27-36 weeks) this pregnancy. The patient declined the influenza vaccine this year. The problem list reflects the active issues addressed during today's visit    Vitals:    BP: 105/64  Weight - Scale: 135 lb (61.2 kg)  Pulse: 82  Fetal HR: RNSt  Movement: Present     NST:   Fetal heart rate baseline: 140, moderate variability, accelerations present, decelerations absent    The tracing has been reviewed and is considered reactive. Biophysical Profile: Max Vertical Pocket: 4 cm  Tone: Present  Movement: Present  Breathing: Present    Biophysical Score: 8 / 8    Fetal Position: Cephalic      28 Week Labs: The patient is RH +, Rhogam not indicated  ABO/Rh   Date Value Ref Range Status   2023 A POSITIVE  Final       1hr GTT: 113   28 week CBC:   Lab Results   Component Value Date    WBC 8.1 2023    HGB 9.4 (L) 2023    HCT 31.0 (L) 2023    MCV 82.9 2023     2023     UA w/ Ur C&S: Neg     Assessment & Plan:  Tha Lala is a 25 y.o. female  at 34w7d   - Discussed signs or symptoms of when to call office   - Discussed fetal movement parameters  - 28 week labs completed   - discussed recommendations for TDAP immunization, patient declined TDAP. - Continue  testing weekly until delivery   - Continue taking Prenatal Vitamin.   - The ACIP recommended pregnant patients be included in phase 1C of vaccine distribution. This decision is supported by [facility] and ACOG. As of 2021, there have been over 30,000 pregnant patients included in the V-safe post COVID vaccination safety .  Most (73%) reports to VAERS among pregnant women involved non-pregnancyspecific adverse

## 2023-05-10 ENCOUNTER — TELEPHONE (OUTPATIENT)
Dept: OBGYN CLINIC | Age: 23
End: 2023-05-10

## 2023-05-10 NOTE — TELEPHONE ENCOUNTER
IOL scheduled. Reason for IOL: cHTN no meds     LVM for patient to call back for induction date and time. Date: 5/27/23  Time: 8:00am    Patient informed that if schedule for an induction of labor, please call [phone] 1 hour prior to induction time to assess for open bed.         [place], [place]

## 2023-05-26 ENCOUNTER — TELEPHONE (OUTPATIENT)
Dept: OBGYN CLINIC | Age: 23
End: 2023-05-26

## 2023-06-01 ENCOUNTER — APPOINTMENT (OUTPATIENT)
Dept: LABOR AND DELIVERY | Age: 23
DRG: 560 | End: 2023-06-01
Payer: COMMERCIAL

## 2023-06-01 ENCOUNTER — HOSPITAL ENCOUNTER (INPATIENT)
Age: 23
LOS: 2 days | Discharge: HOME | DRG: 560 | End: 2023-06-03
Attending: STUDENT IN AN ORGANIZED HEALTH CARE EDUCATION/TRAINING PROGRAM | Admitting: STUDENT IN AN ORGANIZED HEALTH CARE EDUCATION/TRAINING PROGRAM
Payer: COMMERCIAL

## 2023-06-01 PROBLEM — O09.899 SHORT INTERVAL BETWEEN PREGNANCIES COMPLICATING PREGNANCY, ANTEPARTUM: Status: RESOLVED | Noted: 2023-02-09 | Resolved: 2023-06-01

## 2023-06-01 PROBLEM — O09.93 HIGH-RISK PREGNANCY IN THIRD TRIMESTER: Status: RESOLVED | Noted: 2023-06-01 | Resolved: 2023-06-01

## 2023-06-01 PROBLEM — O09.93 HIGH-RISK PREGNANCY IN THIRD TRIMESTER: Status: ACTIVE | Noted: 2023-06-01

## 2023-06-01 PROBLEM — Z3A.38 38 WEEKS GESTATION OF PREGNANCY: Status: RESOLVED | Noted: 2023-06-01 | Resolved: 2023-06-01

## 2023-06-01 PROBLEM — O10.919 CHRONIC HYPERTENSION DURING PREGNANCY: Status: ACTIVE | Noted: 2023-06-01

## 2023-06-01 PROBLEM — Z3A.38 38 WEEKS GESTATION OF PREGNANCY: Status: ACTIVE | Noted: 2023-06-01

## 2023-06-01 LAB
ABO + RH BLD: NORMAL
ALBUMIN SERPL-MCNC: 3.9 G/DL (ref 3.5–5.2)
ALBUMIN/GLOB SERPL: 1.1 {RATIO} (ref 1–2.5)
ALP SERPL-CCNC: 160 U/L (ref 35–104)
ALT SERPL-CCNC: 9 U/L (ref 5–33)
AMPHET UR QL SCN: NEGATIVE
ANION GAP SERPL CALCULATED.3IONS-SCNC: 11 MMOL/L (ref 9–17)
ARM BAND NUMBER: NORMAL
AST SERPL-CCNC: 16 U/L
BARBITURATES UR QL SCN: NEGATIVE
BASOPHILS # BLD: 0.03 K/UL (ref 0–0.2)
BASOPHILS NFR BLD: 0 % (ref 0–2)
BENZODIAZ UR QL: NEGATIVE
BILIRUB SERPL-MCNC: 0.4 MG/DL (ref 0.3–1.2)
BLOOD GROUP ANTIBODIES SERPL: NEGATIVE
BUN SERPL-MCNC: 9 MG/DL (ref 6–20)
CALCIUM SERPL-MCNC: 9 MG/DL (ref 8.6–10.4)
CANNABINOID SCREEN URINE: NEGATIVE
CHLORIDE SERPL-SCNC: 102 MMOL/L (ref 98–107)
CO2 SERPL-SCNC: 22 MMOL/L (ref 20–31)
COCAINE UR QL SCN: NEGATIVE
CREAT SERPL-MCNC: 0.4 MG/DL (ref 0.5–0.9)
CREAT UR-MCNC: 213.8 MG/DL (ref 28–217)
EOSINOPHIL # BLD: 0.04 K/UL (ref 0–0.44)
EOSINOPHILS RELATIVE PERCENT: 1 % (ref 1–4)
ERYTHROCYTE [DISTWIDTH] IN BLOOD BY AUTOMATED COUNT: 17.8 % (ref 11.8–14.4)
EXPIRATION DATE: NORMAL
FENTANYL URINE: NEGATIVE
GFR SERPL CREATININE-BSD FRML MDRD: >60 ML/MIN/1.73M2
GLUCOSE SERPL-MCNC: 93 MG/DL (ref 70–99)
HCT VFR BLD AUTO: 32.3 % (ref 36.3–47.1)
HGB BLD-MCNC: 9.9 G/DL (ref 11.9–15.1)
IMM GRANULOCYTES # BLD AUTO: 0.09 K/UL (ref 0–0.3)
IMM GRANULOCYTES NFR BLD: 1 %
LYMPHOCYTES # BLD: 22 % (ref 24–43)
LYMPHOCYTES NFR BLD: 1.82 K/UL (ref 1.1–3.7)
MCH RBC QN AUTO: 24.6 PG (ref 25.2–33.5)
MCHC RBC AUTO-ENTMCNC: 30.7 G/DL (ref 28.4–34.8)
MCV RBC AUTO: 80.1 FL (ref 82.6–102.9)
METHADONE SCREEN, URINE: NEGATIVE
MONOCYTES NFR BLD: 0.59 K/UL (ref 0.1–1.2)
MONOCYTES NFR BLD: 7 % (ref 3–12)
NEUTROPHILS NFR BLD: 69 % (ref 36–65)
NEUTS SEG NFR BLD: 5.54 K/UL (ref 1.5–8.1)
NRBC AUTOMATED: 0 PER 100 WBC
OPIATES UR QL SCN: NEGATIVE
OXYCODONE SCREEN URINE: NEGATIVE
PCP UR QL SCN: NEGATIVE
PLATELET # BLD AUTO: 324 K/UL (ref 138–453)
PMV BLD AUTO: 10.7 FL (ref 8.1–13.5)
POTASSIUM SERPL-SCNC: 3.9 MMOL/L (ref 3.7–5.3)
PROT SERPL-MCNC: 7.3 G/DL (ref 6.4–8.3)
RBC # BLD AUTO: 4.03 M/UL (ref 3.95–5.11)
RBC # BLD: ABNORMAL 10*6/UL
SODIUM SERPL-SCNC: 135 MMOL/L (ref 135–144)
T PALLIDUM AB SER QL IA: NONREACTIVE
TEST INFORMATION: NORMAL
TOTAL PROTEIN, URINE: 59 MG/DL
URINE TOTAL PROTEIN CREATININE RATIO: 0.28 (ref 0–0.2)
WBC OTHER # BLD: 8.1 K/UL (ref 3.5–11.3)

## 2023-06-01 PROCEDURE — 6360000002 HC RX W HCPCS

## 2023-06-01 PROCEDURE — 82570 ASSAY OF URINE CREATININE: CPT

## 2023-06-01 PROCEDURE — 80053 COMPREHEN METABOLIC PANEL: CPT

## 2023-06-01 PROCEDURE — 80307 DRUG TEST PRSMV CHEM ANLYZR: CPT

## 2023-06-01 PROCEDURE — 88307 TISSUE EXAM BY PATHOLOGIST: CPT

## 2023-06-01 PROCEDURE — 86901 BLOOD TYPING SEROLOGIC RH(D): CPT

## 2023-06-01 PROCEDURE — 10H07YZ INSERTION OF OTHER DEVICE INTO PRODUCTS OF CONCEPTION, VIA NATURAL OR ARTIFICIAL OPENING: ICD-10-PCS | Performed by: STUDENT IN AN ORGANIZED HEALTH CARE EDUCATION/TRAINING PROGRAM

## 2023-06-01 PROCEDURE — 7200000001 HC VAGINAL DELIVERY

## 2023-06-01 PROCEDURE — 85027 COMPLETE CBC AUTOMATED: CPT

## 2023-06-01 PROCEDURE — 3E0P7VZ INTRODUCTION OF HORMONE INTO FEMALE REPRODUCTIVE, VIA NATURAL OR ARTIFICIAL OPENING: ICD-10-PCS | Performed by: STUDENT IN AN ORGANIZED HEALTH CARE EDUCATION/TRAINING PROGRAM

## 2023-06-01 PROCEDURE — 86780 TREPONEMA PALLIDUM: CPT

## 2023-06-01 PROCEDURE — 84156 ASSAY OF PROTEIN URINE: CPT

## 2023-06-01 PROCEDURE — 2500000003 HC RX 250 WO HCPCS

## 2023-06-01 PROCEDURE — 3E0E77Z INTRODUCTION OF ELECTROLYTIC AND WATER BALANCE SUBSTANCE INTO PRODUCTS OF CONCEPTION, VIA NATURAL OR ARTIFICIAL OPENING: ICD-10-PCS | Performed by: STUDENT IN AN ORGANIZED HEALTH CARE EDUCATION/TRAINING PROGRAM

## 2023-06-01 PROCEDURE — 59409 OBSTETRICAL CARE: CPT | Performed by: STUDENT IN AN ORGANIZED HEALTH CARE EDUCATION/TRAINING PROGRAM

## 2023-06-01 PROCEDURE — 6360000002 HC RX W HCPCS: Performed by: STUDENT IN AN ORGANIZED HEALTH CARE EDUCATION/TRAINING PROGRAM

## 2023-06-01 PROCEDURE — 86850 RBC ANTIBODY SCREEN: CPT

## 2023-06-01 PROCEDURE — 2580000003 HC RX 258: Performed by: STUDENT IN AN ORGANIZED HEALTH CARE EDUCATION/TRAINING PROGRAM

## 2023-06-01 PROCEDURE — 1220000000 HC SEMI PRIVATE OB R&B

## 2023-06-01 PROCEDURE — 6370000000 HC RX 637 (ALT 250 FOR IP)

## 2023-06-01 PROCEDURE — 10907ZC DRAINAGE OF AMNIOTIC FLUID, THERAPEUTIC FROM PRODUCTS OF CONCEPTION, VIA NATURAL OR ARTIFICIAL OPENING: ICD-10-PCS | Performed by: STUDENT IN AN ORGANIZED HEALTH CARE EDUCATION/TRAINING PROGRAM

## 2023-06-01 PROCEDURE — 2580000003 HC RX 258

## 2023-06-01 PROCEDURE — 86900 BLOOD TYPING SEROLOGIC ABO: CPT

## 2023-06-01 PROCEDURE — 0KQM0ZZ REPAIR PERINEUM MUSCLE, OPEN APPROACH: ICD-10-PCS | Performed by: STUDENT IN AN ORGANIZED HEALTH CARE EDUCATION/TRAINING PROGRAM

## 2023-06-01 RX ORDER — IBUPROFEN 800 MG/1
800 TABLET ORAL EVERY 8 HOURS PRN
Qty: 60 TABLET | Refills: 0 | Status: SHIPPED | OUTPATIENT
Start: 2023-06-01

## 2023-06-01 RX ORDER — ACETAMINOPHEN 500 MG
1000 TABLET ORAL EVERY 6 HOURS PRN
Status: DISCONTINUED | OUTPATIENT
Start: 2023-06-01 | End: 2023-06-01

## 2023-06-01 RX ORDER — ONDANSETRON 2 MG/ML
4 INJECTION INTRAMUSCULAR; INTRAVENOUS EVERY 6 HOURS PRN
Status: DISCONTINUED | OUTPATIENT
Start: 2023-06-01 | End: 2023-06-03 | Stop reason: HOSPADM

## 2023-06-01 RX ORDER — IBUPROFEN 800 MG/1
800 TABLET ORAL EVERY 8 HOURS PRN
Status: DISCONTINUED | OUTPATIENT
Start: 2023-06-01 | End: 2023-06-01

## 2023-06-01 RX ORDER — HYDROCORTISONE 25 MG/G
CREAM TOPICAL
Status: DISCONTINUED | OUTPATIENT
Start: 2023-06-01 | End: 2023-06-01

## 2023-06-01 RX ORDER — LANOLIN 72 %
OINTMENT (GRAM) TOPICAL PRN
Status: DISCONTINUED | OUTPATIENT
Start: 2023-06-01 | End: 2023-06-03 | Stop reason: HOSPADM

## 2023-06-01 RX ORDER — SODIUM CHLORIDE, SODIUM LACTATE, POTASSIUM CHLORIDE, CALCIUM CHLORIDE 600; 310; 30; 20 MG/100ML; MG/100ML; MG/100ML; MG/100ML
INJECTION, SOLUTION INTRAVENOUS CONTINUOUS
Status: DISCONTINUED | OUTPATIENT
Start: 2023-06-01 | End: 2023-06-01

## 2023-06-01 RX ORDER — NALBUPHINE HYDROCHLORIDE 20 MG/ML
10 INJECTION, SOLUTION INTRAMUSCULAR; INTRAVENOUS; SUBCUTANEOUS ONCE
Status: COMPLETED | OUTPATIENT
Start: 2023-06-01 | End: 2023-06-01

## 2023-06-01 RX ORDER — SODIUM CHLORIDE 0.9 % (FLUSH) 0.9 %
5-40 SYRINGE (ML) INJECTION EVERY 12 HOURS SCHEDULED
Status: DISCONTINUED | OUTPATIENT
Start: 2023-06-01 | End: 2023-06-01

## 2023-06-01 RX ORDER — SODIUM CHLORIDE 9 MG/ML
25 INJECTION, SOLUTION INTRAVENOUS PRN
Status: DISCONTINUED | OUTPATIENT
Start: 2023-06-01 | End: 2023-06-01

## 2023-06-01 RX ORDER — SODIUM CHLORIDE 0.9 % (FLUSH) 0.9 %
5-40 SYRINGE (ML) INJECTION PRN
Status: DISCONTINUED | OUTPATIENT
Start: 2023-06-01 | End: 2023-06-01

## 2023-06-01 RX ORDER — SODIUM CHLORIDE, SODIUM LACTATE, POTASSIUM CHLORIDE, AND CALCIUM CHLORIDE .6; .31; .03; .02 G/100ML; G/100ML; G/100ML; G/100ML
500 INJECTION, SOLUTION INTRAVENOUS PRN
Status: DISCONTINUED | OUTPATIENT
Start: 2023-06-01 | End: 2023-06-01

## 2023-06-01 RX ORDER — LIDOCAINE HYDROCHLORIDE 10 MG/ML
INJECTION, SOLUTION INFILTRATION; PERINEURAL
Status: COMPLETED
Start: 2023-06-01 | End: 2023-06-01

## 2023-06-01 RX ORDER — IBUPROFEN 600 MG/1
600 TABLET ORAL EVERY 6 HOURS
Status: DISCONTINUED | OUTPATIENT
Start: 2023-06-01 | End: 2023-06-03 | Stop reason: HOSPADM

## 2023-06-01 RX ORDER — SIMETHICONE 80 MG
80 TABLET,CHEWABLE ORAL EVERY 6 HOURS PRN
Status: DISCONTINUED | OUTPATIENT
Start: 2023-06-01 | End: 2023-06-01

## 2023-06-01 RX ORDER — SENNA AND DOCUSATE SODIUM 50; 8.6 MG/1; MG/1
1 TABLET, FILM COATED ORAL DAILY
Qty: 60 TABLET | Refills: 0 | Status: SHIPPED | OUTPATIENT
Start: 2023-06-01

## 2023-06-01 RX ORDER — SODIUM CHLORIDE 9 MG/ML
INJECTION, SOLUTION INTRAVENOUS PRN
Status: DISCONTINUED | OUTPATIENT
Start: 2023-06-01 | End: 2023-06-01

## 2023-06-01 RX ORDER — DOCUSATE SODIUM 100 MG/1
100 CAPSULE, LIQUID FILLED ORAL 2 TIMES DAILY
Status: DISCONTINUED | OUTPATIENT
Start: 2023-06-01 | End: 2023-06-03 | Stop reason: HOSPADM

## 2023-06-01 RX ORDER — SODIUM CHLORIDE 9 MG/ML
INJECTION, SOLUTION INTRAVENOUS PRN
Status: DISCONTINUED | OUTPATIENT
Start: 2023-06-01 | End: 2023-06-03 | Stop reason: HOSPADM

## 2023-06-01 RX ORDER — ONDANSETRON 2 MG/ML
4 INJECTION INTRAMUSCULAR; INTRAVENOUS EVERY 4 HOURS PRN
Status: DISCONTINUED | OUTPATIENT
Start: 2023-06-01 | End: 2023-06-01

## 2023-06-01 RX ORDER — BISACODYL 10 MG
10 SUPPOSITORY, RECTAL RECTAL DAILY PRN
Status: DISCONTINUED | OUTPATIENT
Start: 2023-06-01 | End: 2023-06-01

## 2023-06-01 RX ORDER — LANOLIN 72 %
OINTMENT (GRAM) TOPICAL PRN
Status: DISCONTINUED | OUTPATIENT
Start: 2023-06-01 | End: 2023-06-01

## 2023-06-01 RX ORDER — SODIUM CHLORIDE, SODIUM LACTATE, POTASSIUM CHLORIDE, AND CALCIUM CHLORIDE .6; .31; .03; .02 G/100ML; G/100ML; G/100ML; G/100ML
1000 INJECTION, SOLUTION INTRAVENOUS PRN
Status: DISCONTINUED | OUTPATIENT
Start: 2023-06-01 | End: 2023-06-01

## 2023-06-01 RX ORDER — HYDROCORTISONE 25 MG/G
CREAM TOPICAL
Status: DISCONTINUED | OUTPATIENT
Start: 2023-06-01 | End: 2023-06-03 | Stop reason: HOSPADM

## 2023-06-01 RX ORDER — ACETAMINOPHEN 500 MG
1000 TABLET ORAL EVERY 6 HOURS PRN
Status: DISCONTINUED | OUTPATIENT
Start: 2023-06-01 | End: 2023-06-03 | Stop reason: HOSPADM

## 2023-06-01 RX ORDER — DIPHENHYDRAMINE HCL 25 MG
25 TABLET ORAL EVERY 4 HOURS PRN
Status: DISCONTINUED | OUTPATIENT
Start: 2023-06-01 | End: 2023-06-01

## 2023-06-01 RX ORDER — SODIUM CHLORIDE 0.9 % (FLUSH) 0.9 %
5-40 SYRINGE (ML) INJECTION EVERY 12 HOURS SCHEDULED
Status: DISCONTINUED | OUTPATIENT
Start: 2023-06-01 | End: 2023-06-03 | Stop reason: HOSPADM

## 2023-06-01 RX ORDER — DOCUSATE SODIUM 100 MG/1
100 CAPSULE, LIQUID FILLED ORAL 2 TIMES DAILY
Status: DISCONTINUED | OUTPATIENT
Start: 2023-06-01 | End: 2023-06-01

## 2023-06-01 RX ORDER — SIMETHICONE 80 MG
80 TABLET,CHEWABLE ORAL EVERY 6 HOURS PRN
Status: DISCONTINUED | OUTPATIENT
Start: 2023-06-01 | End: 2023-06-03 | Stop reason: HOSPADM

## 2023-06-01 RX ORDER — KETOROLAC TROMETHAMINE 30 MG/ML
INJECTION, SOLUTION INTRAMUSCULAR; INTRAVENOUS
Status: COMPLETED
Start: 2023-06-01 | End: 2023-06-01

## 2023-06-01 RX ORDER — SODIUM CHLORIDE 0.9 % (FLUSH) 0.9 %
5-40 SYRINGE (ML) INJECTION PRN
Status: DISCONTINUED | OUTPATIENT
Start: 2023-06-01 | End: 2023-06-03 | Stop reason: HOSPADM

## 2023-06-01 RX ORDER — ONDANSETRON 2 MG/ML
4 INJECTION INTRAMUSCULAR; INTRAVENOUS EVERY 6 HOURS PRN
Status: DISCONTINUED | OUTPATIENT
Start: 2023-06-01 | End: 2023-06-01

## 2023-06-01 RX ADMIN — Medication 87.3 MILLI-UNITS/MIN: at 20:23

## 2023-06-01 RX ADMIN — LIDOCAINE HYDROCHLORIDE 100 MG: 10 INJECTION, SOLUTION INFILTRATION; PERINEURAL at 20:32

## 2023-06-01 RX ADMIN — Medication 1 MILLI-UNITS/MIN: at 12:55

## 2023-06-01 RX ADMIN — KETOROLAC TROMETHAMINE 30 MG: 30 INJECTION, SOLUTION INTRAMUSCULAR; INTRAVENOUS at 20:14

## 2023-06-01 RX ADMIN — NALBUPHINE HYDROCHLORIDE 10 MG: 20 INJECTION, SOLUTION INTRAMUSCULAR; INTRAVENOUS; SUBCUTANEOUS at 17:31

## 2023-06-01 RX ADMIN — Medication 166.7 ML: at 20:12

## 2023-06-01 RX ADMIN — Medication 25 MCG: at 08:33

## 2023-06-01 RX ADMIN — SODIUM CHLORIDE, POTASSIUM CHLORIDE, SODIUM LACTATE AND CALCIUM CHLORIDE: 600; 310; 30; 20 INJECTION, SOLUTION INTRAVENOUS at 16:28

## 2023-06-01 RX ADMIN — SODIUM CHLORIDE 5 MILLION UNITS: 900 INJECTION INTRAVENOUS at 09:29

## 2023-06-01 RX ADMIN — SODIUM CHLORIDE, POTASSIUM CHLORIDE, SODIUM LACTATE AND CALCIUM CHLORIDE: 600; 310; 30; 20 INJECTION, SOLUTION INTRAVENOUS at 08:41

## 2023-06-01 RX ADMIN — Medication 2.5 MILLION UNITS: at 17:33

## 2023-06-01 RX ADMIN — Medication 2.5 MILLION UNITS: at 12:57

## 2023-06-01 ASSESSMENT — PAIN SCALES - GENERAL: PAINLEVEL_OUTOF10: 10

## 2023-06-01 NOTE — PROGRESS NOTES
OBGYN Labor Progress Note    Ruben Houser is a 25 y.o. female  at 44w7d  The patient was seen and examined. She called out requesting pain medications. States she does feel pressure and feels like she has to pee. She is coping well without pain meds up until this point     Vital Signs:  Vitals:    23 1255 23 1404 23 1512 23 1555   BP: 119/66 (!) 111/54 119/72 (!) 112/53   Pulse: 77 73 74 71   Resp: 16 16 20 20   Temp:  97.7 °F (36.5 °C)  98.2 °F (36.8 °C)   TempSrc:  Oral  Oral   Weight:       Height:         FHT: 150, moderate variability, accelerations present, decelerations variable- overall tracing is reassuring   Contractions: regular, every 2-3 minutes, difficult to trace due to frequent movement by patient     Examination chaperoned by Christian Nogueira RN     Cervical Exam: 6 cm dilated, 70 effaced, 0 station  Pitocin: @ 4 mu/min    Membranes: Ruptured clear fluid  Scalp Electrode in place: absent  Intrauterine Pressure Catheter in Place: absent    Interventions: SVE    Assessment/Plan:  Ruben Houser is a 25 y.o. female  at 38w5d IUP   - GBS unknown / Rh positive / R immune   - Pen G for GBS prophylaxis due to hx of GBS infections in past pregnancies     IOL 2/2 Chronic Hypertension (no meds)    - VSS    - BP overall well controlled    - Explained since she has not made change we can give her 1 dose of IV pain meds but we do not want to give her too many narcotics close to delivery.  She was understanding     - Continue with pitocin    - S/p noel balloon and cytotec PV x1    - Will continue with position changes and anticipate vaginal delivery soon     Pauly Cordero DO    2023, 5:06 PM

## 2023-06-01 NOTE — CARE COORDINATION
ANTEPARTUM NOTE    38 weeks gestation of pregnancy [Z3A.38]    Daphne Brand was admitted to L&D on 6/1/23 for IOL cHTN no meds @ 38w5d    OB GYN Provider: Dr. Arnie Rodríguez    Will meet with patient after delivery to verify name/address/phone/insurance and discuss discharge planning. Anticipate DC home 2 nights after vaginal delivery or 4 nights after C/S delivery as long as hemodynamically stable.

## 2023-06-01 NOTE — PROGRESS NOTES
Chaplains Dodie Downing and Dariana visited per request for information about advance directives.  explained HCPOA and living will info.  explained that, by law, pt's parents would be her HCPOA if she does not complete a document. Pt stated that she wants her parents to be her decision makers and that she preferred not to complete the document. Chaplains will remain available to offer spiritual and emotional support as needed.      Electronically signed by Angelica Jaime on 6/1/2023 at 10:42 AM.  Chente Barnes  [phone]

## 2023-06-01 NOTE — PROGRESS NOTES
Labor Progress Note    Macho Dumont is a 25 y.o. female  at 44w7d  The patient was seen and examined. SVE, AROM (clear) at bedside without complication and patient tolerated procedure well. Her pain is well controlled. She reports fetal movement is present, denies contractions, complains of  loss of fluid, denies vaginal bleeding.      .  Vital Signs:  Vitals:    23 0655 23 0725 23 1102 23 1255   BP: 107/64  (!) 103/52 119/66   Pulse: 88  81 77   Resp: 16  20 16   Temp: 98.8 °F (37.1 °C)  98.4 °F (36.9 °C)    TempSrc: Oral  Oral    Weight:  135 lb (61.2 kg)     Height:  5' 4\" (1.626 m)         FHT:  145 , moderate variability, accelerations present, decelerations absent  Contractions: none    Chaperone for Intimate Exam: Chaperone was present for entire exam, Chaperone Name: Nydia Vergara RN  Cervical Exam: 2 cm dilated, 70 effaced, -1 station  Pitocin: @ 1 mu/min    Membranes: ruptured, clear fluid   Scalp Electrode in place: absent  Intrauterine Pressure Catheter in Place: absent    Interventions: SVE, AROM     Assessment/Plan:  Macho Dumont is a 25 y.o. female  at 44w7d admitted for IOL 2/2 cHTN (no meds)   - GBS unknown, Pen G for GBS prophylaxis   - cEFM/TOCO: Cat 1 FHT   - Transcervical noel balloon placed    - Cytotec 25 PV placed at this time   - PreE labs wnl, P/C pending   - VSS   - AROM (clear) @ 1400   - Denies s/s preE   - Continue current care     Attending updated and in agreement with plan    Hunter Hardin MD  Ob/Gyn Resident  2023, 1:44 PM

## 2023-06-01 NOTE — H&P
spotting  Dermatological: negative rash, negative pruritis, negative mole or other skin changes  Hematologic: negative bruising  Immunologic/Lymphatic: negative recent illness, negative recent sick contact  Musculoskeletal: negative back pain, negative myalgias, negative arthralgias  Neurological:  negative dizziness, negative migraines, negative seizures, negative weakness  Behavior/Psych: negative depression, negative anxiety, negative SI, negative HI    OBSTETRICAL HISTORY:   OB History    Para Term  AB Living   2 1 1 0 0 1   SAB IAB Ectopic Molar Multiple Live Births   0 0 0 0 0 1      # Outcome Date GA Lbr Antony/2nd Weight Sex Delivery Anes PTL Lv   2 Current            1 Term 22 37w6d / 00:08 5 lb 12.8 oz (2.63 kg) M Vag-Spont None N RADHA      Birth Comments: GDMA-1      Name: Tiffanie Reyes: 8  Apgar5: 9       PAST MEDICAL HISTORY:   has a past medical history of Anemia, Dizziness, Gestational diabetes mellitus, Heart defect, Heart disease, Hypertension, and Martha-Parkinson-White pattern. PAST SURGICAL HISTORY:   has a past surgical history that includes ablation of dysrhythmic focus and [place] tooth extraction. ALLERGIES:  has No Known Allergies. MEDICATIONS:  Prior to Admission medications    Medication Sig Start Date End Date Taking? Authorizing Provider   Misc.  Devices (BREAST PUMP) MISC 1 each by Does not apply route every 3 hours as needed (empty breasts at least every 3 hours in postpartum period) 23   CHANCE Moeller - CNP   iron sucrose (VENOFER) 20 MG/ML injection Infuse 5 mLs intravenously once a week Pt to receive infusions at [address] [facility]  Patient not taking: No sig reported 23   Tal Dominguez DO   ferrous sulfate (IRON 325) 325 (65 Fe) MG tablet Take 1 tablet by mouth every other day  Patient not taking: No sig reported 23   Isidoro Oconnor MD   aspirin (ASPIRIN CHILDRENS) 81 MG chewable tablet Take 2 tablets by

## 2023-06-01 NOTE — FLOWSHEET NOTE
Dr. Esteban Stacks in room. SVE. IUPC placed et amnio infusion initiated. Pt c/o increased pain with ctx. Updated on plan of care.

## 2023-06-01 NOTE — FLOWSHEET NOTE
Dr. Santosh Gomez in to see pt. .  Morales balloon placed et inflated to 60 ml. Cytotec placed. Pt tolerated well.

## 2023-06-01 NOTE — PROGRESS NOTES
OBGYN Labor Progress Note    Ariana Canales is a 25 y.o. female  at 44w7d  The patient was seen and examined. Her pain is well controlled. She reports fetal movement is present, complains of contractions, complains of loss of fluid, denies vaginal bleeding. She is agreeable to IUPC placement and amnioinfusion.      Vital Signs:  Vitals:    23 1404 23 1512 23 1555 23 1739   BP: (!) 111/54 119/72 (!) 112/53 120/67   Pulse: 73 74 71 83   Resp: 16 20 20 16   Temp: 97.7 °F (36.5 °C)  98.2 °F (36.8 °C) 98.2 °F (36.8 °C)   TempSrc: Oral  Oral Oral   Weight:       Height:             FHT:  125 , moderate variability, accelerations present, recurrent variable decelerations present  Contractions: regular, every 2 minutes    Examination chaperoned by Randi Ordonez RN    Cervical Exam: 6 cm dilated, 70% effaced, -1 station  Pitocin: @ 3 mu/min    Membranes: Ruptured clear fluid  Scalp Electrode in place: absent  Intrauterine Pressure Catheter in Place: present    Interventions: IUPC placement and amnioinfusion started    Assessment/Plan:  Ariana Canales is a 25 y.o. female  at 38w5d IUP   - GBS unknown / Rh positive / R immune   - Pen G for GBS prophylaxis    IOL 2/2 cHTN (no meds)   - VSS   - cEFM and TOCO   - IVF:  mL/hr   - S/p Cytotec 25mcg PV x1   - S/p noel balloon   - S/p Nubain x1   - AROM clear fluid at 1400   - Pitocin per protocol   - IUPC placed and amnioinfusion started due to recurrent variable decelerations    - Continue expectant management      Attending and senior updated and in agreement with plan    DO YVON Barcenas Resident  2023, 7:14 PM

## 2023-06-01 NOTE — DISCHARGE SUMMARY
to her baby with exposure. She was counseled on various alternate recommendations to decrease the exposure to secondary smoke to her children.

## 2023-06-01 NOTE — PROGRESS NOTES
Labor Progress Note    Ruth Ann Yancey is a 25 y.o. female  at 44w7d  The patient was seen and examined. Her pain is well controlled. She reports fetal movement is present, denies contractions, denies loss of fluid, denies vaginal bleeding. Noel balloon placed and inflated to 60 cc sterile saline, vaginal cytotec also placed.     Vital Signs:  Vitals:    23 0655 23 0725   BP: 107/64    Pulse: 88    Resp: 16    Temp: 98.8 °F (37.1 °C)    TempSrc: Oral    Weight:  135 lb (61.2 kg)   Height:  5' 4\" (1.626 m)             FHT:  145 , moderate variability, accelerations present, decelerations absent  Contractions: none    Chaperone for Intimate Exam: Chaperone was present for entire exam, Chaperone Name: Luis Dai RN  Cervical Exam: 2 cm dilated, 70 effaced, -1 station  Pitocin: @ 0 mu/min    Membranes: Intact  Scalp Electrode in place: absent  Intrauterine Pressure Catheter in Place: absent    Interventions: Transcervical noel balloon and vaginal Cytotec placed     Assessment/Plan:  Ruth Ann Yancey is a 25 y.o. female  at 38w5d admitted for IOL 2/2 cHTN (no meds)   - GBS unknown, Pen G for GBS prophylaxis   - cEFM/TOCO: Cat 1 FHT   - Transcervical noel balloon placed    - Cytotec 25 PV placed at this time   - PreE labs wnl, P/C pending   - VSS   - Denies s/s preE   - Continue current care         Attending updated and in agreement with plan    Sara Jordan DO  Ob/Gyn Resident  2023, 9:11 AM

## 2023-06-01 NOTE — PROGRESS NOTES
Labor Progress Note    Cj Bradford is a 25 y.o. female  at 44w7d  The patient was seen and examined. SVE at bedside 2/2 pateint feeling more pressure and patient tolerated exam well. Her pain is well controlled. She reports fetal movement is present, denies contractions, complains of  loss of fluid, denies vaginal bleeding.      .  Vital Signs:  Vitals:    23 1102 23 1255 23 1404 23 1512   BP: (!) 103/52 119/66 (!) 111/54 119/72   Pulse: 81 77 73 74   Resp: 20 16 16 20   Temp: 98.4 °F (36.9 °C)  97.7 °F (36.5 °C)    TempSrc: Oral  Oral    Weight:       Height:           FHT:  145 , moderate variability, accelerations present, decelerations absent  Contractions: every 2-4 min     Chaperone for Intimate Exam: Chaperone was present for entire exam, Chaperone Name: Shannon Harmon, RN  Cervical Exam: 6 cm dilated, 70 effaced, -1 station  Pitocin: @ 2 mu/min    Membranes: ruptured, clear fluid   Scalp Electrode in place: absent  Intrauterine Pressure Catheter in Place: absent    Interventions: SVE    Assessment/Plan:  Cj Bradford is a 25 y.o. female  at 44w7d admitted for IOL 2/2 cHTN (no meds)   - GBS unknown, Pen G for GBS prophylaxis   - VSS   - cEFM/TOCO: Cat 1 FHT   - S/p noel balloon    - S/p Cytotec 25 PV x1   - AROM (clear) @ 1400   - Pitocin per protocol    - Continue current care     Attending updated and in agreement with plan    Kavita Chatman MD  Ob/Gyn Resident  2023, 3:58 PM

## 2023-06-02 PROCEDURE — 6370000000 HC RX 637 (ALT 250 FOR IP)

## 2023-06-02 PROCEDURE — 2580000003 HC RX 258

## 2023-06-02 PROCEDURE — 1220000000 HC SEMI PRIVATE OB R&B

## 2023-06-02 RX ADMIN — IBUPROFEN 600 MG: 600 TABLET, FILM COATED ORAL at 16:06

## 2023-06-02 RX ADMIN — DOCUSATE SODIUM 100 MG: 100 CAPSULE ORAL at 07:47

## 2023-06-02 RX ADMIN — SODIUM CHLORIDE, PRESERVATIVE FREE 10 ML: 5 INJECTION INTRAVENOUS at 21:36

## 2023-06-02 RX ADMIN — DOCUSATE SODIUM 100 MG: 100 CAPSULE ORAL at 21:36

## 2023-06-02 ASSESSMENT — PAIN DESCRIPTION - ORIENTATION: ORIENTATION: MID;LOWER

## 2023-06-02 ASSESSMENT — PAIN DESCRIPTION - DESCRIPTORS: DESCRIPTORS: CRAMPING

## 2023-06-02 ASSESSMENT — PAIN SCALES - GENERAL
PAINLEVEL_OUTOF10: 5
PAINLEVEL_OUTOF10: 7

## 2023-06-02 ASSESSMENT — PAIN DESCRIPTION - LOCATION: LOCATION: ABDOMEN

## 2023-06-02 NOTE — FLOWSHEET NOTE
Patient admitted to room 737 from 708 via wheelchair. Oriented to room and surroundings. Plan of care reviewed. Verbalized understanding. Instructed on infant security and safe sleep practices. Preventing falls education provided . The following handouts given: A New Beginning: Your Guide to Postpartum Care, Rounding, gs Security System,Babies Cry A lot, Safe Sleep, Security and Visitation Guidelines. Call light placed within reach.

## 2023-06-02 NOTE — CARE COORDINATION
Social Work     Sw reviewed medical record (current active problem list) and tox screens and found no current concerns. Sw spoke with mom and dad briefly to explain Sw role, inquire if any needs or concerns, and provide safe sleep education and discuss. Mom denied any needs or questions and informs baby has a safe sleep environment (crib). Mom denied any current s/s of anxiety or depression and is aware to reach out to OB if any s/s occur after dc. Mom reports a good support system, fob present and supportive, and denied any current questions or needs. Mom reports this is her 2nd child, she also has an 9 month old son. Mom states ped will be near [facility]. Sw encouraged parents to reach out if any issues or concerns arise.

## 2023-06-02 NOTE — PLAN OF CARE
Problem: Vaginal Birth or  Section  Goal: Fetal and maternal status remain reassuring during the birth process  Description:  Birth OB-Pregnancy care plan goal which identifies if the fetal and maternal status remain reassuring during the birth process  Outcome: Progressing     Problem: Postpartum  Goal: Experiences normal postpartum course  Description:  Postpartum OB-Pregnancy care plan goal which identifies if the mother is experiencing a normal postpartum course  Outcome: Progressing     Problem: Pain  Goal: Verbalizes/displays adequate comfort level or baseline comfort level  Outcome: Progressing

## 2023-06-02 NOTE — PLAN OF CARE
Problem: Vaginal Birth or  Section  Goal: Fetal and maternal status remain reassuring during the birth process  Description:  Birth OB-Pregnancy care plan goal which identifies if the fetal and maternal status remain reassuring during the birth process  Outcome: Progressing     Problem: Postpartum  Goal: Experiences normal postpartum course  Description:  Postpartum OB-Pregnancy care plan goal which identifies if the mother is experiencing a normal postpartum course  Outcome: Progressing  Goal: Appropriate maternal -  bonding  Description:  Postpartum OB-Pregnancy care plan goal which identifies if the mother and  are bonding appropriately  Outcome: Progressing  Goal: Establishment of infant feeding pattern  Description:  Postpartum OB-Pregnancy care plan goal which identifies if the mother is establishing a feeding pattern with their   Outcome: Progressing  Goal: Incisions, wounds, or drain sites healing without S/S of infection  Outcome: Progressing     Problem: Pain  Goal: Verbalizes/displays adequate comfort level or baseline comfort level  Outcome: Progressing

## 2023-06-02 NOTE — PROGRESS NOTES
POST PARTUM DAY # 1    Morgan Portillo is a 25 y.o. female  This patient was seen & examined today. Her pregnancy was complicated by:   Patient Active Problem List   Diagnosis    GERD    Martha-Parkinson-White    Hx cHTN (no meds) w/ CRISTOFER w/o SF    Hx GDMA1    cHTN (no meds)    Anemia affecting pregnancy    Circumvallate placenta    Low-lying placenta (RSLVD)    Maternal iron deficiency anemia affecting pregnancy in third trimester, antepartum    Antepartum anemia complicating pregnancy    Chronic hypertension during pregnancy     23 F Apg 8/9 Wt 7#9       Today she is doing well without any chief complaint. Her lochia is light. She denies chest pain, shortness of breath, headache, lightheadedness, and blurred vision. She is  breast feeding and she denies any breast tenderness. She is ambulating well. Her voiding pattern is normal. I reviewed signs and symptoms of post partum depression with the patient, she currently denies any of these symptoms. She is tolerating solids.      Vital Signs:  Vitals:    23 2200 23 2215 23 2230 23 2310   BP: 120/77 123/72 (!) 119/91 126/68   Pulse: 77 80 84 71   Resp: 16 18 17 18   Temp:    98.3 °F (36.8 °C)   TempSrc:    Oral   SpO2:    100%   Weight:       Height:             Physical Exam:  General:  no apparent distress, alert, and cooperative  Neurologic:  alert, oriented, normal speech, no focal findings or movement disorder noted  Lungs:  No increased work of breathing, good air exchange, clear to auscultation bilaterally, no crackles or wheezing  Heart:  regular rate and rhythm    Abdomen: abdomen soft, non-distended, non-tender  Fundus: non-tender, normal size, firm, below umbilicus  Extremities:  no calf tenderness, non edematous    Lab:  Lab Results   Component Value Date    HGB 9.9 (L) 2023     Lab Results   Component Value Date    HCT 32.3 (L) 2023       Assessment/Plan:  Morgan Portillo is a [id number] PPD # 1 s/p    - Doing well,

## 2023-06-02 NOTE — FLOWSHEET NOTE
Transferred pt to [facility] in [address] via wheelchair with baby wrapped per arms. Stable vital signs, minimal vaginal bleeding, endorsed to RN in charge for continuity of care.

## 2023-06-02 NOTE — L&D DELIVERY NOTE
Delivery:    She was GBS unknown and received antibiotic prophylaxis. The patient progressed well, became complete and felt the urge to push. After pushing with contractions the fetal head delivered Cephalic, left occiput anterior over an intact perineum, nuchal cord was not present. The anterior, then posterior shoulder delivered easily and atraumatically followed by the rest of the infant. Nose and mouth suctioned with bulb suction, infant was stimulated and dried. Cord was clamped and cut after one minute delayed cord clamping and infant was placed on maternal abdomen, and attended by RN for evaluation. The delivery of the placenta was spontaneous and appeared intact. Pitocin was started. The vagina was swept of all clots and debris. The perineum and vagina were evaluated and a midline 2nd degree and perineal laceration was found and repaired in standard fashion with 3-0 vicryl. Dermabond was placed overtop. Mother and baby tolerated procedure well. Dr. Evan Flanagan was present for entire delivery. Delivery Summary:  Labor & Delivery Summary  Dilation Complete Date: 06/01/23  Dilation Complete Time: 1951    Specimen: placenta sent to pathology, cord blood, and cord gases  Quantitative blood loss:  100ml immediately following delivery  Condition:  infant stable to general nursery and mother stable  Counts: instrument and sponge counts correct  Blood Type and Rh: A POSITIVE    Rubella Immunity Status: immune  Infant Feeding: both breast and bottle feeding    Jalen Rodriguez MD  Ob/Gyn Resident  6/1/2023, 8:53 PM    Attending Attestation:   I was present and scrubbed for the entire uncomplicated vaginal delivery and repair.      [address] OB[facility]  Date: 6/2/2023  Time: 8:59 AM

## 2023-06-02 NOTE — CARE COORDINATION
CASE MANAGEMENT POST-PARTUM TRANSITIONAL CARE PLAN    38 weeks gestation of pregnancy [Z3A.38]    OB Provider: Dr. Chun Ackerman met w/ Hi Mays and FOB/BF Concepcion Gann at her bedside to discuss DCP. She is S/P  on 23 @ 38w5d at [employer] updated address as she has moved, phone number correct on facesheet. Hi Mays also asked to remove her dad from emergency contacts and add Jorge's mom as her parents don't speak English well. She states she lives with her BF/FOB Concepcion Gann and their 9 month old son. Latisha verbalized no difficulties with transportation to and from doctors appointments as she utilizes [employer] or with paying for medications upon discharge home. [facility] [place] Medicaid insurance correct. Writer notified Hi Mays she has 30 days from date of birth to add  to insurance policy by contacting [address] [place] [address]. She verbalized understanding. Hi Mays and Qian Coffee confirmed a safe place for infant to sleep at home. Infant name on BC: [facility]. Infant PCP Dr. Gregorio Niño on [facility].      DME: None  HOME CARE: none    Anticipate DC home in private vehicle of couplet 6/3/23    Readmission Risk              Risk of Unplanned Readmission:  5

## 2023-06-03 VITALS
SYSTOLIC BLOOD PRESSURE: 101 MMHG | DIASTOLIC BLOOD PRESSURE: 60 MMHG | WEIGHT: 135 LBS | HEIGHT: 64 IN | OXYGEN SATURATION: 100 % | HEART RATE: 87 BPM | TEMPERATURE: 98.6 F | BODY MASS INDEX: 23.05 KG/M2 | RESPIRATION RATE: 18 BRPM

## 2023-06-03 PROCEDURE — 6370000000 HC RX 637 (ALT 250 FOR IP)

## 2023-06-03 RX ADMIN — IBUPROFEN 600 MG: 600 TABLET, FILM COATED ORAL at 07:09

## 2023-06-03 ASSESSMENT — PAIN - FUNCTIONAL ASSESSMENT: PAIN_FUNCTIONAL_ASSESSMENT: ACTIVITIES ARE NOT PREVENTED

## 2023-06-03 ASSESSMENT — PAIN DESCRIPTION - LOCATION: LOCATION: BACK

## 2023-06-03 ASSESSMENT — PAIN DESCRIPTION - ORIENTATION: ORIENTATION: MID

## 2023-06-03 ASSESSMENT — PAIN DESCRIPTION - DESCRIPTORS: DESCRIPTORS: SORE

## 2023-06-03 ASSESSMENT — PAIN SCALES - GENERAL: PAINLEVEL_OUTOF10: 5

## 2023-06-03 NOTE — PROGRESS NOTES
POST PARTUM DAY # 2    Bobbi Hendrix is a 25 y.o. female  This patient was seen & examined today. Her pregnancy was complicated by:   Patient Active Problem List   Diagnosis    GERD    Martha-Parkinson-White    Hx cHTN (no meds) w/ CRISTOFER w/o SF    Hx GDMA1    cHTN (no meds)    Anemia affecting pregnancy    Circumvallate placenta    Low-lying placenta (RSLVD)    Maternal iron deficiency anemia affecting pregnancy in third trimester, antepartum    Antepartum anemia complicating pregnancy    Chronic hypertension during pregnancy     23 F Apg 8/9 Wt 7#9       Today she is doing well without any chief complaint. Denies any lochia. She denies chest pain, shortness of breath, headache, lightheadedness, and blurred vision. She is  breast feeding and she denies any breast tenderness. She is ambulating well. Her voiding pattern is normal. I reviewed signs and symptoms of post partum depression with the patient, she currently denies any of these symptoms. She is tolerating solids. Vital Signs:  Vitals:    23 0830   BP: 101/60   Pulse: 87   Resp: 18   Temp: 98.6 °F (37 °C)   SpO2: 100%         Physical Exam:  General:  no apparent distress, alert, and cooperative  Neurologic:  alert, oriented, normal speech, no focal findings or movement disorder noted  Lungs:  No increased work of breathing, good air exchange, clear to auscultation bilaterally, no crackles or wheezing  Heart:  regular rate and rhythm and regular rate and rhythm    Abdomen: Abdomen soft, non-tender.  non-distended  Fundus: non-tender, normal size, firm, below umbilicus  Extremities:  no calf tenderness, non edematous    Lab:  Lab Results   Component Value Date    HGB 9.9 (L) 2023     Lab Results   Component Value Date    HCT 32.3 (L) 2023       Assessment/Plan:  Bobbi Hendrix is a [id number] PPD # 1 s/p               - Doing well, VSS              - female infant in [address]              - Encourage ambulation              -

## 2023-06-03 NOTE — FLOWSHEET NOTE
Patient discharged to home. Meds to beds delivered medications. Patient verbalized understanding of meds and instructions. Supplies given to patient. Patient to main lobby with FOB. Infant carried in car seat to main lobby by FOB.

## 2023-06-05 LAB — SURGICAL PATHOLOGY REPORT: NORMAL

## 2023-10-09 ENCOUNTER — HOSPITAL ENCOUNTER (EMERGENCY)
Age: 23
Discharge: HOME | End: 2023-10-09
Attending: EMERGENCY MEDICINE
Payer: COMMERCIAL

## 2023-10-09 VITALS
TEMPERATURE: 98.6 F | RESPIRATION RATE: 18 BRPM | OXYGEN SATURATION: 99 % | SYSTOLIC BLOOD PRESSURE: 130 MMHG | HEART RATE: 107 BPM | DIASTOLIC BLOOD PRESSURE: 85 MMHG

## 2023-10-09 DIAGNOSIS — N93.9 VAGINAL BLEEDING: Primary | ICD-10-CM

## 2023-10-09 DIAGNOSIS — B96.89 BV (BACTERIAL VAGINOSIS): ICD-10-CM

## 2023-10-09 DIAGNOSIS — N76.0 BV (BACTERIAL VAGINOSIS): ICD-10-CM

## 2023-10-09 LAB
ALBUMIN SERPL-MCNC: 4.5 G/DL (ref 3.5–5.2)
ALBUMIN/GLOB SERPL: 1.3 {RATIO} (ref 1–2.5)
ALP SERPL-CCNC: 67 U/L (ref 35–104)
ALT SERPL-CCNC: 15 U/L (ref 5–33)
ANION GAP SERPL CALCULATED.3IONS-SCNC: 14 MMOL/L (ref 9–17)
AST SERPL-CCNC: 23 U/L
BASOPHILS # BLD: 0.05 K/UL (ref 0–0.2)
BASOPHILS NFR BLD: 1 % (ref 0–2)
BILIRUB SERPL-MCNC: 0.5 MG/DL (ref 0.3–1.2)
BILIRUB UR QL STRIP: NEGATIVE
BUN SERPL-MCNC: 11 MG/DL (ref 6–20)
CALCIUM SERPL-MCNC: 8.7 MG/DL (ref 8.6–10.4)
CANDIDA SPECIES: NEGATIVE
CASTS #/AREA URNS LPF: ABNORMAL /LPF (ref 0–8)
CHLORIDE SERPL-SCNC: 100 MMOL/L (ref 98–107)
CLARITY UR: ABNORMAL
CO2 SERPL-SCNC: 22 MMOL/L (ref 20–31)
COLOR UR: ABNORMAL
CREAT SERPL-MCNC: 0.7 MG/DL (ref 0.5–0.9)
EOSINOPHIL # BLD: <0.03 K/UL (ref 0–0.44)
EOSINOPHILS RELATIVE PERCENT: 0 % (ref 1–4)
EPI CELLS #/AREA URNS HPF: ABNORMAL /HPF (ref 0–5)
ERYTHROCYTE [DISTWIDTH] IN BLOOD BY AUTOMATED COUNT: 16.5 % (ref 11.8–14.4)
GARDNERELLA VAGINALIS: POSITIVE
GFR SERPL CREATININE-BSD FRML MDRD: >60 ML/MIN/1.73M2
GLUCOSE SERPL-MCNC: 93 MG/DL (ref 70–99)
GLUCOSE UR STRIP-MCNC: NEGATIVE MG/DL
HCG SERPL QL: NEGATIVE
HCT VFR BLD AUTO: 36.8 % (ref 36.3–47.1)
HGB BLD-MCNC: 11.1 G/DL (ref 11.9–15.1)
HGB UR QL STRIP.AUTO: ABNORMAL
IMM GRANULOCYTES # BLD AUTO: <0.03 K/UL (ref 0–0.3)
IMM GRANULOCYTES NFR BLD: 0 %
KETONES UR STRIP-MCNC: ABNORMAL MG/DL
LEUKOCYTE ESTERASE UR QL STRIP: ABNORMAL
LIPASE SERPL-CCNC: 35 U/L (ref 13–60)
LYMPHOCYTES NFR BLD: 1.43 K/UL (ref 1.1–3.7)
LYMPHOCYTES RELATIVE PERCENT: 14 % (ref 24–43)
MCH RBC QN AUTO: 22.3 PG (ref 25.2–33.5)
MCHC RBC AUTO-ENTMCNC: 30.2 G/DL (ref 28.4–34.8)
MCV RBC AUTO: 73.9 FL (ref 82.6–102.9)
MONOCYTES NFR BLD: 0.92 K/UL (ref 0.1–1.2)
MONOCYTES NFR BLD: 9 % (ref 3–12)
NEUTROPHILS NFR BLD: 76 % (ref 36–65)
NEUTS SEG NFR BLD: 7.62 K/UL (ref 1.5–8.1)
NITRITE UR QL STRIP: NEGATIVE
NRBC BLD-RTO: 0 PER 100 WBC
PH UR STRIP: 7 [PH] (ref 5–8)
PLATELET # BLD AUTO: 367 K/UL (ref 138–453)
PMV BLD AUTO: 10.2 FL (ref 8.1–13.5)
POTASSIUM SERPL-SCNC: 3.4 MMOL/L (ref 3.7–5.3)
PROT SERPL-MCNC: 8 G/DL (ref 6.4–8.3)
PROT UR STRIP-MCNC: ABNORMAL MG/DL
RBC # BLD AUTO: 4.98 M/UL (ref 3.95–5.11)
RBC # BLD: ABNORMAL 10*6/UL
RBC #/AREA URNS HPF: ABNORMAL /HPF (ref 0–4)
SODIUM SERPL-SCNC: 136 MMOL/L (ref 135–144)
SOURCE: ABNORMAL
SP GR UR STRIP: 1.03 (ref 1–1.03)
TRICHOMONAS: NEGATIVE
UROBILINOGEN UR STRIP-ACNC: NORMAL EU/DL (ref 0–1)
WBC #/AREA URNS HPF: ABNORMAL /HPF (ref 0–5)
WBC OTHER # BLD: 10.1 K/UL (ref 3.5–11.3)

## 2023-10-09 PROCEDURE — 85025 COMPLETE CBC W/AUTO DIFF WBC: CPT

## 2023-10-09 PROCEDURE — 87591 N.GONORRHOEAE DNA AMP PROB: CPT

## 2023-10-09 PROCEDURE — 6360000002 HC RX W HCPCS

## 2023-10-09 PROCEDURE — 87491 CHLMYD TRACH DNA AMP PROBE: CPT

## 2023-10-09 PROCEDURE — 96374 THER/PROPH/DIAG INJ IV PUSH: CPT

## 2023-10-09 PROCEDURE — 6370000000 HC RX 637 (ALT 250 FOR IP)

## 2023-10-09 PROCEDURE — 87480 CANDIDA DNA DIR PROBE: CPT

## 2023-10-09 PROCEDURE — 87510 GARDNER VAG DNA DIR PROBE: CPT

## 2023-10-09 PROCEDURE — 87086 URINE CULTURE/COLONY COUNT: CPT

## 2023-10-09 PROCEDURE — 83690 ASSAY OF LIPASE: CPT

## 2023-10-09 PROCEDURE — 99284 EMERGENCY DEPT VISIT MOD MDM: CPT

## 2023-10-09 PROCEDURE — 81001 URINALYSIS AUTO W/SCOPE: CPT

## 2023-10-09 PROCEDURE — 80053 COMPREHEN METABOLIC PANEL: CPT

## 2023-10-09 PROCEDURE — 87660 TRICHOMONAS VAGIN DIR PROBE: CPT

## 2023-10-09 PROCEDURE — 84703 CHORIONIC GONADOTROPIN ASSAY: CPT

## 2023-10-09 RX ORDER — ONDANSETRON 2 MG/ML
4 INJECTION INTRAMUSCULAR; INTRAVENOUS ONCE
Status: COMPLETED | OUTPATIENT
Start: 2023-10-09 | End: 2023-10-09

## 2023-10-09 RX ORDER — METRONIDAZOLE 500 MG/1
500 TABLET ORAL 2 TIMES DAILY
Qty: 14 TABLET | Refills: 0 | Status: SHIPPED | OUTPATIENT
Start: 2023-10-09 | End: 2023-10-16

## 2023-10-09 RX ORDER — METRONIDAZOLE 500 MG/1
500 TABLET ORAL ONCE
Status: COMPLETED | OUTPATIENT
Start: 2023-10-09 | End: 2023-10-09

## 2023-10-09 RX ORDER — ACETAMINOPHEN 500 MG
1000 TABLET ORAL ONCE
Status: COMPLETED | OUTPATIENT
Start: 2023-10-09 | End: 2023-10-09

## 2023-10-09 RX ADMIN — ONDANSETRON 4 MG: 2 INJECTION INTRAMUSCULAR; INTRAVENOUS at 16:59

## 2023-10-09 RX ADMIN — ACETAMINOPHEN 1000 MG: 500 TABLET ORAL at 17:00

## 2023-10-09 RX ADMIN — METRONIDAZOLE 500 MG: 500 TABLET ORAL at 19:30

## 2023-10-09 ASSESSMENT — ENCOUNTER SYMPTOMS
EYE PAIN: 0
ABDOMINAL PAIN: 0
WHEEZING: 0
COUGH: 0
VOMITING: 0
PHOTOPHOBIA: 0
RHINORRHEA: 0
COLOR CHANGE: 0
SHORTNESS OF BREATH: 0
EYE REDNESS: 0
NAUSEA: 0
SORE THROAT: 0
BACK PAIN: 0

## 2023-10-09 NOTE — ED NOTES
The following labs were labeled with appropriate pt sticker and tubed to lab:     [] Blue     [] Lavender   [] on ice  [] Green/yellow  [] Green/black [] on ice  [] [facility]  [] on ice  [] Yellow  [] Red  [] Pink  [] Type/ Screen  [] ABG  [] VBG    [] COVID-19 swab    [] Rapid  [] PCR  [] Flu swab  [] Peds Viral Panel     [] Urine Sample  [] Fecal Sample  [x] Pelvic Cultures  [] Blood Cultures  [] X 2  [] STREP Cultures       Kita Bernal RN  10/09/23 5422

## 2023-10-09 NOTE — ED NOTES
Temporary report received from [facility], [facility], RN  10/09/23 1597

## 2023-10-09 NOTE — DISCHARGE INSTRUCTIONS
Call today or tomorrow to follow up with your OB / Gresham Hoop in 3 days    Potential miscarriage has not been ruled out in your case of pregnancy. Use ibuprofen or Tylenol (unless prescribed medications that have Tylenol in it) for pain. You can take over the counter Ibuprofen (advil) tablets (4 tablets every 8 hours or 3 tablets every 6 hours or 2 tablets every 4 hours)    Return to the Emergency Department for worsening of vaginal bleeding, using more than 4 pads per hour, feeling of weakness, dizzy, nausea / vomiting, any other care or concern. On cephalexin, per mom it looks like blood in the diaper from the stool. Please adise.

## 2023-10-09 NOTE — ED NOTES
The following labs were labeled with appropriate pt sticker and tubed to lab:     [] Blue     [] Lavender   [] on ice  [] Green/yellow  [] Green/black [] on ice  [] Yocasta Apgar  [] on ice  [] Yellow  [] Red  [] Pink  [] Type/ Screen  [] ABG  [] VBG    [] COVID-19 swab    [] Rapid  [] PCR  [] Flu swab  [] Peds Viral Panel     [x] Urine Sample  [] Fecal Sample  [] Pelvic Cultures  [] Blood Cultures  [] X 2  [] STREP Cultures       Bradly Skiff, RN  10/09/23 4239

## 2023-10-09 NOTE — ED PROVIDER NOTES
[address] [place] [address] ED  Emergency Department Encounter  Emergency Medicine Resident     Pt Name:Latisha Haddad  MRN: [id number]  [address]  Date of evaluation: 10/9/23  PCP:  No, Pcp  Note Started: 4:54 PM EDT      CHIEF COMPLAINT       Chief Complaint   Patient presents with    Vaginal Bleeding       HISTORY OF PRESENT ILLNESS  (Location/Symptom, Timing/Onset, Context/Setting, Quality, Duration, Modifying Factors, Severity.)      Delonte Rodriges is a 25 y.o. female who presents with vaginal bleeding. Patient states that she is G2,  comes in for 1 day of vaginal bleeding. She states that she has soaked 2 pads. She states her second pad she had a lot of abdominal cramping sweating and she passed tissues. She states that she brought him in for us to look at. They look like clots. States that she is continues to have vaginal bleeding. States that she gave birth in  has had vaginal spotting since. Per says she was placed on a Depo shot on . States that she is having some abdominal pain nausea. Denies vomiting diarrhea constipation or urinary symptoms including frequency burning or blood in her urine. PAST MEDICAL / SURGICAL / SOCIAL / FAMILY HISTORY      has a past medical history of Anemia, Dizziness, Gestational diabetes mellitus, Heart defect, Heart disease, Hypertension, and Martha-Parkinson-White pattern. has a past surgical history that includes ablation of dysrhythmic focus and [place] tooth extraction.       Social History     Socioeconomic History    Marital status: Single     Spouse name: Not on file    Number of children: Not on file    Years of education: Not on file    Highest education level: Not on file   Occupational History    Not on file   Tobacco Use    Smoking status: Never    Smokeless tobacco: Never   Vaping Use    Vaping Use: Never used   Substance and Sexual Activity    Alcohol use: No    Drug use: No    Sexual activity: Yes     Partners: Male

## 2023-10-09 NOTE — ED NOTES
Labeled blood specimens sent to lab via tube system.     [x] Lavender   [] on ice   [x] Blue   [x] Green/yellow  [x] Green/black [] on ice  [] Pink  [] Red  [x] Yellow  [] on ice  [] Blood Cultures      Carmen Burch RN  10/09/23 2568

## 2023-10-09 NOTE — ED NOTES
Pt reports to the ED with complaints of vaginal bleeding. Pt states she gave birth on 6/5 and has had intermittent vaginal spotting since. Pt states on 9/29 she got the birth control shot and today she had bleeding heavier than she has since the birth and expelled some tissue also. Pt denies this tissue looking like a clot. Pt also states she was pregnancy negative on the 29th because OB tested her. Pt denies any vaginal discharge. Pt is A&O and speaking in complete sentences. Pt is resting in bed comfortably, NAD noted. Pt denies chest pain or SOB.  Care ongoing     Antoine El RN  10/09/23 1921

## 2023-10-10 LAB
C TRACH DNA SPEC QL PROBE+SIG AMP: NEGATIVE
MICROORGANISM SPEC CULT: NORMAL
N GONORRHOEA DNA SPEC QL PROBE+SIG AMP: NEGATIVE
SPECIMEN DESCRIPTION: NORMAL
SPECIMEN DESCRIPTION: NORMAL

## 2024-04-01 ENCOUNTER — APPOINTMENT (OUTPATIENT)
Dept: ULTRASOUND IMAGING | Age: 24
End: 2024-04-01
Payer: COMMERCIAL

## 2024-04-01 ENCOUNTER — HOSPITAL ENCOUNTER (EMERGENCY)
Age: 24
Discharge: HOME | End: 2024-04-01
Attending: EMERGENCY MEDICINE
Payer: COMMERCIAL

## 2024-04-01 VITALS
HEART RATE: 75 BPM | RESPIRATION RATE: 18 BRPM | DIASTOLIC BLOOD PRESSURE: 77 MMHG | TEMPERATURE: 97.2 F | SYSTOLIC BLOOD PRESSURE: 121 MMHG | OXYGEN SATURATION: 100 %

## 2024-04-01 DIAGNOSIS — O20.0 THREATENED MISCARRIAGE IN EARLY PREGNANCY: Primary | ICD-10-CM

## 2024-04-01 LAB
AMORPH SED URNS QL MICRO: NORMAL
B-HCG SERPL EIA 3RD IS-ACNC: 5559 MIU/ML (ref 0–7)
BILIRUB UR QL STRIP: NEGATIVE
CANDIDA SPECIES: NEGATIVE
CLARITY UR: CLEAR
COLOR UR: YELLOW
EPI CELLS #/AREA URNS HPF: NORMAL /HPF (ref 0–5)
GARDNERELLA VAGINALIS: NEGATIVE
GLUCOSE UR STRIP-MCNC: NEGATIVE MG/DL
HGB UR QL STRIP.AUTO: ABNORMAL
KETONES UR STRIP-MCNC: NEGATIVE MG/DL
LEUKOCYTE ESTERASE UR QL STRIP: ABNORMAL
NITRITE UR QL STRIP: NEGATIVE
PH UR STRIP: 7 [PH] (ref 5–8)
PROT UR STRIP-MCNC: NEGATIVE MG/DL
RBC #/AREA URNS HPF: NORMAL /HPF (ref 0–2)
SOURCE: NORMAL
SP GR UR STRIP: 1 (ref 1–1.03)
TRICHOMONAS: NEGATIVE
UROBILINOGEN UR STRIP-ACNC: NORMAL EU/DL (ref 0–1)
WBC #/AREA URNS HPF: NORMAL /HPF (ref 0–5)

## 2024-04-01 PROCEDURE — 87480 CANDIDA DNA DIR PROBE: CPT

## 2024-04-01 PROCEDURE — 87510 GARDNER VAG DNA DIR PROBE: CPT

## 2024-04-01 PROCEDURE — 99284 EMERGENCY DEPT VISIT MOD MDM: CPT

## 2024-04-01 PROCEDURE — 99234 HOSP IP/OBS SM DT SF/LOW 45: CPT | Performed by: OBSTETRICS & GYNECOLOGY

## 2024-04-01 PROCEDURE — 87491 CHLMYD TRACH DNA AMP PROBE: CPT

## 2024-04-01 PROCEDURE — 76817 TRANSVAGINAL US OBSTETRIC: CPT

## 2024-04-01 PROCEDURE — 84702 CHORIONIC GONADOTROPIN TEST: CPT

## 2024-04-01 PROCEDURE — 87660 TRICHOMONAS VAGIN DIR PROBE: CPT

## 2024-04-01 PROCEDURE — 87591 N.GONORRHOEAE DNA AMP PROB: CPT

## 2024-04-01 PROCEDURE — 81001 URINALYSIS AUTO W/SCOPE: CPT

## 2024-04-01 ASSESSMENT — PAIN - FUNCTIONAL ASSESSMENT: PAIN_FUNCTIONAL_ASSESSMENT: NONE - DENIES PAIN

## 2024-04-01 ASSESSMENT — ENCOUNTER SYMPTOMS
NAUSEA: 0
VOMITING: 0
ABDOMINAL PAIN: 0

## 2024-04-01 NOTE — ED PROVIDER NOTES
[facility]     Emergency Department     Faculty Attestation    I performed a history and physical examination of the patient and discussed management with the resident. I reviewed the resident’s note and agree with the documented findings and plan of care. Any areas of disagreement are noted on the chart. I was personally present for the key portions of any procedures. I have documented in the chart those procedures where I was not present during the key portions. I have reviewed the emergency nurses triage note. I agree with the chief complaint, past medical history, past surgical history, allergies, medications, social and family history as documented unless otherwise noted below.        For Physician Assistant/ Nurse Practitioner cases/documentation I have personally evaluated this patient and have completed at least one if not all key elements of the E/M (history, physical exam, and MDM). Additional findings are as noted.  I have personally seen and evaluated the patient.  I find the patient's history and physical exam are consistent with the NP/PA documentation.  I agree with the care provided, treatment rendered, disposition and follow-up plan.          Critical Care     Mahamed Beard M.D.  Attending Emergency  Physician           Mahamed Beard MD  04/01/24 4229    
Steven [facility], [facility]  [address]  [address]  [place] [id number]  [phone]    Call  Call this number to establish care with the midwife practice, thanks!      DISCHARGE MEDICATIONS:  Discharge Medication List as of 4/1/2024  6:45 PM          Saeed Zhao MD  Emergency Medicine Resident    (Please note that portions of thisnote were completed with a voice recognition program.  Efforts were made to edit the dictations but occasionally words are mis-transcribed.)

## 2024-04-01 NOTE — PROGRESS NOTES
OB/GYN Consult  [facility]    Patient Name: Latisha Haddad     Patient : 2000  Room/Bed: /36  Admission Date/Time: 2024  1:29 PM  Primary Care Physician: [facility], Pcp    Consulting Provider: Dr. Beard  Reason for Consult: Positive pregnancy, Vaginal bleeding    CC:   Chief Complaint   Patient presents with    Vaginal Bleeding     Positive pregnancy test last week, unsure if pregnant                 HPI: Latisha Haddad is a 23 y.o. female  presents to the ED with complaint of vaginal bleeding and mild intermittent abdominal cramping that started this morning and feels like a menstrual cycle pain wise. She states that she has not soaked through any pads or passed large clots. Patient had a positive home pregnancy test about a week ago. She reports that she was previously on Depo Provera for birth control, however missed her dose in February. She states that this is not a planned pregnancy, but is now a desired pregnancy if it continues. Patient denies dizziness, lightheadedness, abdominal pain, chest pain or shortness of breath.    REVIEW OF SYSTEMS:   A minimum of an eleven point review of systems was completed.    Constitutional: negative fever, negative chills  HEENT: negative visual disturbances, negative headaches  Respiratory: negative dyspnea, negative cough  Cardiovascular: negative chest pain,  negative palpitations  Gastrointestinal: positive abdominal cramping, negative RUQ pain, negative N/V, negative diarrhea, negative constipation  Genitourinary: negative dysuria, negative vaginal discharge, positive vaginal bleeding  Dermatological: negative rash, negative wounds  Hematologic: negative bleeding/clotting disorder  Immunologic: negative recent illness, negative recent sick contact, negative allergic reactions  Lymphatic: negative lymph nodes  Musculoskeletal: negative back pain, negative myalgias, negative arthralgias  Neurological:  negative dizziness, negative

## 2024-04-01 NOTE — CONSULTS
identified. Estimated gestational age is 6 weeks 0 days +/-4 days.  Fetal heart rate is not identified. Right ovary measures 2.8 x 2.9 x 1.7 cm.  Solid right ovarian finding measures 2.2 cm, likely corpus luteum.  Left ovary measures 2.7 x 2.1 x 1.5 cm.  Normal blood flow seen in both ovaries. Trace amount of pelvic free fluid is seen in the cul-de-sac. Measurements: Estimated gestational age by current ultrasound: 2024 Estimated gestational age by LMP/prior ultrasound: LMP is 2024.  [place]FF[place] by LMP is 2024.     Early intrauterine gestation sac identified.  Fetal pole measures 3.5 mm and no fetal cardiac activity is identified.  Recommend short-term follow-up and correlation with serial beta HCG measurements for further evaluation.       ASSESSMENT & PLAN:    Latisha Haddad is a 23 y.o. female  who presented to the ED after having a positive home pregnancy test a week ago and new onset vaginal bleeding this morning. OBGYN consulted and evaluated patient.   - VSS, afebrile   - hCG quant 5559   - Vaginitis negative   - GCC pending   - UA not suspicious for UTI   - SSE: performed by ED resident, see documentation for details   - TVUS: Early intrauterine gestation sac identified.  Fetal pole measures 3.5 mm and  no fetal cardiac activity is identified. Approximately 6w0d gestation.   - Discussed with patient that it is too early to tell if this just an early intrauterine pregnancy or threatened miscarriage. Plan to repeat hCG in 48 hours to trend quant.  - Patient desires to have a midwife this pregnancy and discussed that I will send a message to their office for close follow up and repeat ultrasound as needed.   - Return precautions provided and patient states understanding.     Patient Active Problem List    Diagnosis Date Noted    Hx GDMA1 2022     Priority: High     Early 1 hr GTT ordered      Circumvallate placenta 02/15/2023     Priority: Medium    Low-lying placenta (RSLVD) 02/15/2023

## 2024-04-01 NOTE — ED NOTES
Pt presents to the ED via triage with c/o vaginal bleeding and cramping since this morning  Pt states she viola a positive pregnancy test a week ago. Pt states she woke up with vaginal bleeding that was similar to her menstrual cycle. Pt also reports receiving the Depo shot for her birth control and states her last injection was in December.   VSS, NAD, AOx4. Patient resting in bed, respirations even and unlabored. Call light in reach.

## 2024-04-01 NOTE — DISCHARGE INSTRUCTIONS
Obtain the repeat Beta HCG blood work as described by OB/GYN.  Make sure to follow up with the midwife for  care.     Return to the ER if you develop worsening bleeding, abdominal cramping/contraction, rush of fluids, severe headaches, vomiting and unable to eat and drink, or any other symptom of concern.

## 2024-04-02 LAB
C TRACH DNA SPEC QL PROBE+SIG AMP: NEGATIVE
N GONORRHOEA DNA SPEC QL PROBE+SIG AMP: NEGATIVE
SPECIMEN DESCRIPTION: NORMAL

## 2024-04-03 ENCOUNTER — HOSPITAL ENCOUNTER (EMERGENCY)
Age: 24
Discharge: HOME | End: 2024-04-03
Attending: EMERGENCY MEDICINE
Payer: COMMERCIAL

## 2024-04-03 VITALS
DIASTOLIC BLOOD PRESSURE: 65 MMHG | OXYGEN SATURATION: 100 % | HEART RATE: 65 BPM | TEMPERATURE: 98.2 F | RESPIRATION RATE: 17 BRPM | SYSTOLIC BLOOD PRESSURE: 112 MMHG

## 2024-04-03 DIAGNOSIS — O03.9 SPONTANEOUS ABORTION: Primary | ICD-10-CM

## 2024-04-03 LAB
B-HCG SERPL EIA 3RD IS-ACNC: 3701 MIU/ML (ref 0–7)
BACTERIA URNS QL MICRO: NORMAL
BASOPHILS # BLD: 0.05 K/UL (ref 0–0.2)
BASOPHILS NFR BLD: 1 % (ref 0–2)
BILIRUB UR QL STRIP: NEGATIVE
CASTS #/AREA URNS LPF: NORMAL /LPF (ref 0–8)
CLARITY UR: CLEAR
COLOR UR: YELLOW
EOSINOPHIL # BLD: 0.06 K/UL (ref 0–0.44)
EOSINOPHILS RELATIVE PERCENT: 1 % (ref 1–4)
EPI CELLS #/AREA URNS HPF: NORMAL /HPF (ref 0–5)
ERYTHROCYTE [DISTWIDTH] IN BLOOD BY AUTOMATED COUNT: 16.1 % (ref 11.8–14.4)
GLUCOSE UR STRIP-MCNC: NEGATIVE MG/DL
HCT VFR BLD AUTO: 31.1 % (ref 36.3–47.1)
HGB BLD-MCNC: 9.2 G/DL (ref 11.9–15.1)
HGB UR QL STRIP.AUTO: ABNORMAL
IMM GRANULOCYTES # BLD AUTO: <0.03 K/UL (ref 0–0.3)
IMM GRANULOCYTES NFR BLD: 0 %
KETONES UR STRIP-MCNC: NEGATIVE MG/DL
LEUKOCYTE ESTERASE UR QL STRIP: ABNORMAL
LYMPHOCYTES NFR BLD: 2.16 K/UL (ref 1.1–3.7)
LYMPHOCYTES RELATIVE PERCENT: 31 % (ref 24–43)
MCH RBC QN AUTO: 21.8 PG (ref 25.2–33.5)
MCHC RBC AUTO-ENTMCNC: 29.6 G/DL (ref 28.4–34.8)
MCV RBC AUTO: 73.7 FL (ref 82.6–102.9)
MONOCYTES NFR BLD: 0.57 K/UL (ref 0.1–1.2)
MONOCYTES NFR BLD: 8 % (ref 3–12)
NEUTROPHILS NFR BLD: 59 % (ref 36–65)
NEUTS SEG NFR BLD: 4.14 K/UL (ref 1.5–8.1)
NITRITE UR QL STRIP: NEGATIVE
NRBC BLD-RTO: 0 PER 100 WBC
PH UR STRIP: 6 [PH] (ref 5–8)
PLATELET # BLD AUTO: 331 K/UL (ref 138–453)
PMV BLD AUTO: 10.5 FL (ref 8.1–13.5)
PROT UR STRIP-MCNC: NEGATIVE MG/DL
RBC # BLD AUTO: 4.22 M/UL (ref 3.95–5.11)
RBC # BLD: ABNORMAL 10*6/UL
RBC #/AREA URNS HPF: NORMAL /HPF (ref 0–4)
SP GR UR STRIP: 1.02 (ref 1–1.03)
UROBILINOGEN UR STRIP-ACNC: NORMAL EU/DL (ref 0–1)
WBC #/AREA URNS HPF: NORMAL /HPF (ref 0–5)
WBC OTHER # BLD: 7 K/UL (ref 3.5–11.3)

## 2024-04-03 PROCEDURE — 84702 CHORIONIC GONADOTROPIN TEST: CPT

## 2024-04-03 PROCEDURE — 81001 URINALYSIS AUTO W/SCOPE: CPT

## 2024-04-03 PROCEDURE — 85025 COMPLETE CBC W/AUTO DIFF WBC: CPT

## 2024-04-03 PROCEDURE — 99283 EMERGENCY DEPT VISIT LOW MDM: CPT | Performed by: EMERGENCY MEDICINE

## 2024-04-03 RX ORDER — ONDANSETRON 4 MG/1
4 TABLET, FILM COATED ORAL 3 TIMES DAILY PRN
Qty: 15 TABLET | Refills: 0 | Status: SHIPPED | OUTPATIENT
Start: 2024-04-03

## 2024-04-03 RX ORDER — IBUPROFEN 600 MG/1
600 TABLET ORAL EVERY 6 HOURS PRN
Qty: 40 TABLET | Refills: 1 | Status: SHIPPED | OUTPATIENT
Start: 2024-04-03

## 2024-04-03 RX ORDER — OXYCODONE HYDROCHLORIDE 5 MG/1
5 TABLET ORAL EVERY 6 HOURS PRN
Qty: 2 TABLET | Refills: 0 | Status: SHIPPED | OUTPATIENT
Start: 2024-04-03 | End: 2024-04-05

## 2024-04-03 RX ORDER — MISOPROSTOL 200 UG/1
800 TABLET ORAL EVERY 24 HOURS
Qty: 8 TABLET | Refills: 0 | Status: SHIPPED | OUTPATIENT
Start: 2024-04-03

## 2024-04-03 RX ORDER — ACETAMINOPHEN 500 MG
1000 TABLET ORAL 3 TIMES DAILY
Qty: 40 TABLET | Refills: 1 | Status: SHIPPED | OUTPATIENT
Start: 2024-04-03

## 2024-04-03 ASSESSMENT — ENCOUNTER SYMPTOMS
ABDOMINAL PAIN: 0
VOMITING: 0
SHORTNESS OF BREATH: 0
NAUSEA: 0

## 2024-04-03 NOTE — DISCHARGE INSTRUCTIONS
Seen in the emergency department for vaginal bleeding.  You most likely had a miscarriage.  At this time we will discharge.  OB did see you while you are in the emergency department.  They wrote her prescription for Cytotec.    You will need to follow-up in 1 week for repeat hCG.    Please return to the emergency department with any new or worsening symptoms.

## 2024-04-03 NOTE — CONSULTS
OB/GYN Consult  [facility]    Patient Name: Latisha Haddad     Patient : 2000  Room/Bed:   Admission Date/Time: 4/3/2024 12:46 PM  Primary Care Physician: Paradise, Pcp    Consulting Provider: Dr. Albert  Reason for Consult: Vaginal bleeding/spontaneous     CC:   Chief Complaint   Patient presents with    Vaginal Bleeding     Miscarriage rule out unable to get into OB; still bleeding and passing clots                 HPI: Latisha Haddad is a 23 y.o. female  presents c/o vaginal bleeding x2 days. Patient was also seen on 24 for vaginal bleeding. She had a TVUS that showed early intrauterine gestation sac with fetal pole measuring 3.5 mm and no identifiable fetal cardiac activity.  bHCG on 24 was 5,559. Patient has continued to have mild bleeding but came in because she passed a cherry sized clot. She has not had heavy or worsening continued bleeding since the clot. bHCG today is 3,701, highly suspicious for pregnancy failure. Patient did not have a CBC on 24, but today her Hgb is 9.2 Of note, patient does have a history of chronic Anemia.    Patient denies syncopal episode, fever, N/V/D.    No LMP recorded.     REVIEW OF SYSTEMS:   A minimum of an eleven point review of systems was completed.    Constitutional: negative fever, negative chills   HEENT: negative visual disturbances, negative headaches  Respiratory: negative dyspnea, negative cough  Cardiovascular: negative chest pain,  negative palpitations  Gastrointestinal: negative abdominal pain, negative RUQ pain, negative N/V, negative diarrhea, negative constipation  Genitourinary: negative dysuria, + vaginal bleeding, +cramping  Dermatological: negative rash  Hematologic: negative bruising  Immunologic/Lymphatic: negative recent illness, negative recent sick contact  Musculoskeletal: negative back pain, negative myalgias, negative arthralgias  Neurological:  negative dizziness, negative weakness  Behavior/Psych:

## 2024-04-03 NOTE — ED PROVIDER NOTES
[facility] ED     Emergency Department     Faculty Attestation    I performed a history and physical examination of the patient and discussed management with the resident. I reviewed the resident’s note and agree with the documented findings and plan of care. Any areas of disagreement are noted on the chart. I was personally present for the key portions of any procedures. I have documented in the chart those procedures where I was not present during the key portions. I have reviewed the emergency nurses triage note. I agree with the chief complaint, past medical history, past surgical history, allergies, medications, social and family history as documented unless otherwise noted below. For Physician Assistant/ Nurse Practitioner cases/documentation I have personally evaluated this patient and have completed at least one if not all key elements of the E/M (history, physical exam, and MDM). Additional findings are as noted.    Note Started: 2:12 PM EDT    Patient here with persistent vaginal bleeding now with clots.  Seen 2 days ago for threatened miscarriage.  Ultrasound done showed fetal pole 6 weeks 0 days with no cardiac activity despite a quant of 5000.  Given return precautions and returns today.  Cramping has improved still bleeding.  No nausea or vomiting.  Abdomen soft nontender rebound or guarding.  Will repeat quant H&H consult will be      Critical Care     none    Lobito Albert MD, FACEP, FAAEM  Attending Emergency  Physician           Lobito Albert MD  04/03/24 1455

## 2024-04-03 NOTE — ED PROVIDER NOTES
Note Started: 7:20 PM EDT     Faculty Sign-Out Attestation  Handoff taken on the following patient from prior Attending Physician: Bety    I was available and discussed any additional care issues that arose and coordinated the management plans with the resident(s) caring for the patient during my duty period. Any areas of disagreement with resident’s documentation of care or procedures are noted on the chart. I was personally present for the key portions of any/all procedures during my duty period. I have documented in the chart those procedures where I was not present during the key portions.    23-year-old female presenting with suspected incomplete miscarriage.  6-week ultrasound showed fetal pole without cardiac activity.  Quant has been decreasing.  OB currently evaluating, disposition per their team    Vania Navarro MD  Attending Physician        Vania Navarro MD  04/03/24 1921

## 2024-04-03 NOTE — ED NOTES
Pt presents to the ED via triage with c/o vaginal bleeding x 2 days.  Pt states she was seen here on 4/1 with vaginal bleeding, pt had positive pregnancy test to home week prior to visit. Pt also reports missing her last depo shot in March. Pt states vaginal bleeding slightly increased and state she's passes two blood clots since Monday. Pt denies abdominal cramping/pain, chest pain, nausea, vomiting, or shortness of breath.  VSS, NAD, AOx4. Patient resting in bed, respirations even and unlabored. Call light in reach.

## 2024-04-03 NOTE — ED NOTES
The following labs were labeled with appropriate pt sticker and tubed to lab:     [] Blue     [x] Lavender   [] on ice  [x] Green/yellow  [x] Green/black [] on ice  [] Castellon  [] on ice  [x] Yellow  [] Red  [] Pink  [] Type/ Screen  [] ABG  [] VBG    [] COVID-19 swab    [] Rapid  [] PCR  [] Flu swab  [] Peds Viral Panel     [] Urine Sample  [] Fecal Sample  [] Pelvic Cultures  [] Blood Cultures  [] X 2  [] STREP Cultures  [] Wound Cultures

## 2024-04-03 NOTE — ED PROVIDER NOTES
[facility] ED  Emergency Department Encounter  Emergency Medicine Resident     Pt Name:Latisha Haddad  MRN: [id number]  Birthdate 2000  Date of evaluation: 4/3/24  PCP:  Paradise, Pcp  Note Started: 12:47 PM EDT      CHIEF COMPLAINT       Chief Complaint   Patient presents with    Vaginal Bleeding     Miscarriage rule out unable to get into OB; still bleeding and passing clots        HISTORY OF PRESENT ILLNESS  (Location/Symptom, Timing/Onset, Context/Setting, Quality, Duration, Modifying Factors, Severity.)      Latisha Haddad is a 23 y.o. female who presents with vaginal bleeding and concern for miscarriage.  Patient states the bleeding has been consistent since she was seen in the emergency department, now is passing clots.  Denies any dysuria, fever, chills, abdominal pain.    Patient was seen in the emergency department on 4/1/2024.  At that time patient had TVUS which showed early intrauterine gestational sac, pole with no fetal cardiac activity.  Had hCG quant which was 5559.  LMP February 15.  Patient is Rh+.    Patient has been pregnant in the past twice, 2 living children.  Patient states her and no problems in her prior pregnancies.    PAST MEDICAL / SURGICAL / SOCIAL / FAMILY HISTORY      has a past medical history of Anemia, Dizziness, Gestational diabetes mellitus, Heart defect, Heart disease, Hypertension, and Martha-Parkinson-White pattern.       has a past surgical history that includes ablation of dysrhythmic focus and [place] tooth extraction.      Social History     Socioeconomic History    Marital status: Single     Spouse name: Not on file    Number of children: Not on file    Years of education: Not on file    Highest education level: Not on file   Occupational History    Not on file   Tobacco Use    Smoking status: Never    Smokeless tobacco: Never   Vaping Use    Vaping Use: Never used   Substance and Sexual Activity    Alcohol use: No    Drug use: No    Sexual activity: Yes      will prescribe the patient Cytotec, 2 doses.  States that the patient can be discharged with follow-up in 1 week for repeat hCG testing.  At this time the patient will be discharged with strict return precautions.  Patient's questions were answered, patient verbalized understanding. [MW]      ED Course User Index  [MW] Ike Ortez MD       PROCEDURES:      CONSULTS:  IP CONSULT TO OB GYN    CRITICAL CARE:  There was significant risk of life threatening deterioration of patient's condition requiring my direct management. Critical care time  minutes, excluding any documented procedures.    FINAL IMPRESSION      1. Spontaneous           DISPOSITION / PLAN     DISPOSITION Decision To Discharge 2024 04:50:50 PM      PATIENT REFERRED TO:  [facility] [address]  [address]  [facility] [id number] [phone]  Follow up      [facility] ED  [address] [address]  [phone]  Go to   If symptoms worsen      DISCHARGE MEDICATIONS:  Discharge Medication List as of 4/3/2024  4:50 PM        START taking these medications    Details   oxyCODONE (ROXICODONE) 5 MG immediate release tablet Take 1 tablet by mouth every 6 hours as needed for Pain for up to 2 doses. Intended supply: 3 days. Take lowest dose possible to manage pain Max Daily Amount: 20 mg, Disp-2 tablet, R-0Normal      ondansetron (ZOFRAN) 4 MG tablet Take 1 tablet by mouth 3 times daily as needed for Nausea or Vomiting, Disp-15 tablet, R-0Normal      miSOPROStol (CYTOTEC) 200 MCG tablet Place 4 tablets vaginally every 24 hours Place 4 vaginal tablets. You  may experience heavy bleeding and cramping associated with your miscarriage. This should decrease. If it does not decrease in 24-48 hours. Place 4 more vaginal tablets., Disp-8 tablet , R-0Normal      acetaminophen (TYLENOL) 500 MG tablet Take 2 tablets by mouth 3 times daily, Disp-40 tablet, R-1Normal             Ike Ortez,

## 2024-05-29 ENCOUNTER — PATIENT MESSAGE (OUTPATIENT)
Dept: OBGYN CLINIC | Age: 24
End: 2024-05-29

## 2025-01-21 ENCOUNTER — OFFICE VISIT (OUTPATIENT)
Dept: OBGYN CLINIC | Age: 25
End: 2025-01-21
Payer: COMMERCIAL

## 2025-01-21 ENCOUNTER — HOSPITAL ENCOUNTER (OUTPATIENT)
Age: 25
Setting detail: SPECIMEN
Discharge: HOME | End: 2025-01-21

## 2025-01-21 VITALS
SYSTOLIC BLOOD PRESSURE: 104 MMHG | HEIGHT: 64 IN | DIASTOLIC BLOOD PRESSURE: 62 MMHG | BODY MASS INDEX: 19.81 KG/M2 | WEIGHT: 116 LBS

## 2025-01-21 DIAGNOSIS — N92.6 MISSED MENSES: Primary | ICD-10-CM

## 2025-01-21 DIAGNOSIS — N92.6 MISSED MENSES: ICD-10-CM

## 2025-01-21 LAB
B-HCG SERPL EIA 3RD IS-ACNC: NORMAL MIU/ML
CONTROL: ABNORMAL
PREGNANCY TEST URINE, POC: POSITIVE

## 2025-01-21 PROCEDURE — 3074F SYST BP LT 130 MM HG: CPT | Performed by: NURSE PRACTITIONER

## 2025-01-21 PROCEDURE — G8420 CALC BMI NORM PARAMETERS: HCPCS | Performed by: NURSE PRACTITIONER

## 2025-01-21 PROCEDURE — G8427 DOCREV CUR MEDS BY ELIG CLIN: HCPCS | Performed by: NURSE PRACTITIONER

## 2025-01-21 PROCEDURE — 99213 OFFICE O/P EST LOW 20 MIN: CPT | Performed by: NURSE PRACTITIONER

## 2025-01-21 PROCEDURE — 81025 URINE PREGNANCY TEST: CPT | Performed by: NURSE PRACTITIONER

## 2025-01-21 PROCEDURE — 1036F TOBACCO NON-USER: CPT | Performed by: NURSE PRACTITIONER

## 2025-01-21 PROCEDURE — 3078F DIAST BP <80 MM HG: CPT | Performed by: NURSE PRACTITIONER

## 2025-01-21 PROCEDURE — 36415 COLL VENOUS BLD VENIPUNCTURE: CPT | Performed by: NURSE PRACTITIONER

## 2025-01-21 RX ORDER — PNV NO.95/FERROUS FUM/FOLIC AC 28MG-0.8MG
1 TABLET ORAL DAILY
Qty: 30 TABLET | Refills: 11 | Status: SHIPPED | OUTPATIENT
Start: 2025-01-21

## 2025-01-21 RX ORDER — PNV NO.95/FERROUS FUM/FOLIC AC 28MG-0.8MG
TABLET ORAL
COMMUNITY
Start: 2025-01-14 | End: 2025-01-21

## 2025-01-21 RX ORDER — LANOLIN ALCOHOL/MO/W.PET/CERES
50 CREAM (GRAM) TOPICAL 2 TIMES DAILY
Qty: 60 TABLET | Refills: 3 | Status: SHIPPED | OUTPATIENT
Start: 2025-01-21 | End: 2025-02-20

## 2025-01-21 NOTE — PROGRESS NOTES
Patient was in the office today for a HCG.    Draw per physician order using sterile technique.  Drawn from the left AC.   
12/09/2016     s/p ablation   Fetal ECHO done and WNL      GERD 11/25/2014           PLAN:  Return in about 1 week (around 1/28/2025) for new OB intake/ ultrasound.  Lab for quant HCG drawn in office  Repeat Annual every 1 year  Cervical Cytology Evaluation begins at 21 years old.  If Negative Cytology, Follow-up screening per current guidelines.   Return to the office in 1 weeks.  Counseled on preventative health maintenance follow-up.  Orders Placed This Encounter   Procedures    COLLECTION VENOUS BLOOD,VENIPUNCTURE    hCG, Quantitative, Pregnancy     Standing Status:   Future     Standing Expiration Date:   1/16/2026    POCT urine pregnancy           The patient, Latisha Haddad is a 24 y.o. female, was seen with a total time spent of 20 minutes for the visit on this date of service by the E/M provider. The time component had both face to face and non face to face time spent in determining the total time component.  Counseling and education regarding her diagnosis listed below and her options regarding those diagnoses were also included in determining her time component.      Diagnosis Orders   1. Missed menses  POCT urine pregnancy    hCG, Quantitative, Pregnancy    COLLECTION VENOUS BLOOD,VENIPUNCTURE           The patient had her preventative health maintenance recommendations and follow-up reviewed with her at the completion of her visit.

## 2025-01-22 ENCOUNTER — TELEPHONE (OUTPATIENT)
Dept: OBGYN CLINIC | Age: 25
End: 2025-01-22

## 2025-01-22 DIAGNOSIS — O09.30 LATE PRENATAL CARE: Primary | ICD-10-CM

## 2025-01-22 NOTE — TELEPHONE ENCOUNTER
----- Message from CHANCE Chapa CNP sent at 1/22/2025  7:58 AM EST -----  Please schedule new OB intake/ ultrasound this week or next  Prenatal vitamin 1 PO QD with 12 refills.

## 2025-01-22 NOTE — TELEPHONE ENCOUNTER
[facility] Result Note Patient Contact Communication   [address] [place] [address]      01/22/25   9:13 AM     Patients Name: Latisha Haddad   Medical Record Number: [id number]   Contact Serial Number: [id number]  YOB: 2000       Patients Phone Number: [phone]     Description of Recommendations:  The patient was contacted and her identity was confirmed with two of the specific identifiers listed above.  The information regarding her recent testing results and provider recommendations were reviewed with her in detail and all questions were answered.   Recent labs/Cultures/ Imaging Studies/Pathology reports and Urinalysis results are included:      Recommendations given by provider:  Please schedule new OB intake/ ultrasound this week or next  Prenatal vitamin 1 PO QD with 12 refills.    The patient verbalized understanding of the information above and the recommendation by the provider. She will contact her PCP if any other questions arise or contact our office for any other clarifications.        Electronically signed by Marleny Gan MA on 1/22/2025 at 9:13 AM

## 2025-01-30 ENCOUNTER — INITIAL PRENATAL (OUTPATIENT)
Dept: OBGYN CLINIC | Age: 25
End: 2025-01-30
Payer: COMMERCIAL

## 2025-01-30 VITALS
HEART RATE: 80 BPM | WEIGHT: 119 LBS | BODY MASS INDEX: 20.43 KG/M2 | SYSTOLIC BLOOD PRESSURE: 94 MMHG | DIASTOLIC BLOOD PRESSURE: 60 MMHG

## 2025-01-30 DIAGNOSIS — Z3A.13 13 WEEKS GESTATION OF PREGNANCY: ICD-10-CM

## 2025-01-30 DIAGNOSIS — Z34.90 EARLY STAGE OF PREGNANCY: Primary | ICD-10-CM

## 2025-01-30 PROBLEM — D50.9 MATERNAL IRON DEFICIENCY ANEMIA AFFECTING PREGNANCY IN THIRD TRIMESTER, ANTEPARTUM: Status: RESOLVED | Noted: 2023-04-07 | Resolved: 2025-01-30

## 2025-01-30 PROBLEM — O43.119 CIRCUMVALLATE PLACENTA: Status: RESOLVED | Noted: 2023-02-15 | Resolved: 2025-01-30

## 2025-01-30 PROBLEM — O10.919 CHRONIC HYPERTENSION DURING PREGNANCY: Status: RESOLVED | Noted: 2023-06-01 | Resolved: 2025-01-30

## 2025-01-30 PROBLEM — O99.013 MATERNAL IRON DEFICIENCY ANEMIA AFFECTING PREGNANCY IN THIRD TRIMESTER, ANTEPARTUM: Status: RESOLVED | Noted: 2023-04-07 | Resolved: 2025-01-30

## 2025-01-30 PROBLEM — O99.019 ANTEPARTUM ANEMIA COMPLICATING PREGNANCY: Status: RESOLVED | Noted: 2023-04-14 | Resolved: 2025-01-30

## 2025-01-30 PROBLEM — O44.40 LOW-LYING PLACENTA: Status: RESOLVED | Noted: 2023-02-15 | Resolved: 2025-01-30

## 2025-01-30 PROCEDURE — H1000 PRENATAL CARE ATRISK ASSESSM: HCPCS | Performed by: OBSTETRICS & GYNECOLOGY

## 2025-01-30 NOTE — PROGRESS NOTES
Patient presents to office for OB intake, nurse visit only. Intake completed following ultrasound in office to confirm pregnancy dating/viability. Based on ultrasound, patient is currently 13w2d  gestation, Estimated Date of Delivery: 8/5/25. Patient presents to intake Alone. This was a planned pregnancy. Patient currently taking has a current medication list which includes the following prescription(s): prenatal vitamins and vitamin b-6.. Patient's medical, surgical, obstetrical and family history reviewed. See EHR for details. Patient prenatal lab work given to patient at this visit as well as OB education material. New OB consent forms signed. Patient declined first trimester screening due to dating.  Cystic Fibrosis screening ordered. Referral placed to [facility] for hx HTN, cardiac ablation, heart defect, lisa-parkinson-white syndrome.  Patient scheduled for follow up OB appointment with Lurdes TYLER on 2/24/25.  Patient instructed to complete lab work prior to follow up OB appointment.

## 2025-01-31 DIAGNOSIS — O09.91 SUPERVISION OF HIGH RISK PREGNANCY IN FIRST TRIMESTER: ICD-10-CM

## 2025-01-31 DIAGNOSIS — I10 CHRONIC HYPERTENSION: ICD-10-CM

## 2025-01-31 DIAGNOSIS — I45.6 WPW (WOLFF-PARKINSON-WHITE SYNDROME): ICD-10-CM

## 2025-01-31 DIAGNOSIS — O24.410 DIET CONTROLLED GESTATIONAL DIABETES MELLITUS (GDM) IN FIRST TRIMESTER: Primary | ICD-10-CM

## 2025-03-03 ENCOUNTER — HOSPITAL ENCOUNTER (OUTPATIENT)
Age: 25
Setting detail: SPECIMEN
Discharge: HOME | End: 2025-03-03

## 2025-03-03 ENCOUNTER — ROUTINE PRENATAL (OUTPATIENT)
Dept: OBGYN CLINIC | Age: 25
End: 2025-03-03
Payer: COMMERCIAL

## 2025-03-03 VITALS
BODY MASS INDEX: 21.46 KG/M2 | WEIGHT: 125 LBS | HEART RATE: 82 BPM | DIASTOLIC BLOOD PRESSURE: 70 MMHG | SYSTOLIC BLOOD PRESSURE: 116 MMHG

## 2025-03-03 DIAGNOSIS — Z81.4 FAMILY HISTORY OF SUBSTANCE ABUSE: ICD-10-CM

## 2025-03-03 DIAGNOSIS — Z82.79 FAMILY HISTORY OF CONGENITAL HEART DEFECT: ICD-10-CM

## 2025-03-03 DIAGNOSIS — Z3A.17 17 WEEKS GESTATION OF PREGNANCY: Primary | ICD-10-CM

## 2025-03-03 DIAGNOSIS — Z3A.17 17 WEEKS GESTATION OF PREGNANCY: ICD-10-CM

## 2025-03-03 LAB
AMPHET UR QL SCN: NEGATIVE
BARBITURATES UR QL SCN: NEGATIVE
BENZODIAZ UR QL: NEGATIVE
CANNABINOIDS UR QL SCN: NEGATIVE
COCAINE UR QL SCN: NEGATIVE
FENTANYL UR QL: NEGATIVE
METHADONE UR QL: NEGATIVE
OPIATES UR QL SCN: NEGATIVE
OXYCODONE UR QL SCN: NEGATIVE
PCP UR QL SCN: NEGATIVE
TEST INFORMATION: NORMAL

## 2025-03-03 PROCEDURE — G8420 CALC BMI NORM PARAMETERS: HCPCS | Performed by: NURSE PRACTITIONER

## 2025-03-03 PROCEDURE — 3074F SYST BP LT 130 MM HG: CPT | Performed by: NURSE PRACTITIONER

## 2025-03-03 PROCEDURE — G8427 DOCREV CUR MEDS BY ELIG CLIN: HCPCS | Performed by: NURSE PRACTITIONER

## 2025-03-03 PROCEDURE — 3078F DIAST BP <80 MM HG: CPT | Performed by: NURSE PRACTITIONER

## 2025-03-03 PROCEDURE — 99214 OFFICE O/P EST MOD 30 MIN: CPT | Performed by: NURSE PRACTITIONER

## 2025-03-03 PROCEDURE — 1036F TOBACCO NON-USER: CPT | Performed by: NURSE PRACTITIONER

## 2025-03-03 NOTE — PROGRESS NOTES
regarding her diagnosis listed below and her options regarding those diagnoses were also included in determining her time component.      Diagnosis Orders   1. 17 weeks gestation of pregnancy  Culture, Genital (with Gram Stain)    C.trachomatis N.gonorrhoeae DNA    PAP SMEAR    Urine Drug Screen    Cystic fibrosis carrier study      2. Family history of congenital heart defect        3. Family history of substance abuse  Urine Drug Screen           The patient had her preventative health maintenance recommendations and follow-up reviewed with her at the completion of her visit.

## 2025-03-05 ENCOUNTER — TELEPHONE (OUTPATIENT)
Dept: OBGYN CLINIC | Age: 25
End: 2025-03-05

## 2025-03-05 NOTE — TELEPHONE ENCOUNTER
----- Message from CHANCE Chapa - CNP sent at 3/5/2025  2:01 PM EST -----  + candida- OTC monistat if patient is symptomatic.

## 2025-03-05 NOTE — TELEPHONE ENCOUNTER
LVM for pt to contact office regarding results.     + candida- OTC monistat if patient is symptomatic.

## 2025-03-06 LAB
MICROORGANISM SPEC CULT: ABNORMAL
SERVICE CMNT-IMP: ABNORMAL
SPECIMEN DESCRIPTION: ABNORMAL

## 2025-03-08 LAB — CYTOLOGY REPORT: NORMAL

## 2025-03-10 ENCOUNTER — RESULTS FOLLOW-UP (OUTPATIENT)
Dept: OBGYN CLINIC | Age: 25
End: 2025-03-10

## 2025-03-20 ENCOUNTER — ROUTINE PRENATAL (OUTPATIENT)
Dept: PERINATAL CARE | Age: 25
End: 2025-03-20
Payer: COMMERCIAL

## 2025-03-20 VITALS
SYSTOLIC BLOOD PRESSURE: 116 MMHG | BODY MASS INDEX: 22.2 KG/M2 | TEMPERATURE: 98 F | DIASTOLIC BLOOD PRESSURE: 58 MMHG | HEART RATE: 76 BPM | HEIGHT: 64 IN | WEIGHT: 130 LBS | RESPIRATION RATE: 16 BRPM

## 2025-03-20 DIAGNOSIS — I10 CHRONIC HYPERTENSION: ICD-10-CM

## 2025-03-20 DIAGNOSIS — Z3A.20 20 WEEKS GESTATION OF PREGNANCY: ICD-10-CM

## 2025-03-20 DIAGNOSIS — O24.410 DIET CONTROLLED GESTATIONAL DIABETES MELLITUS (GDM), ANTEPARTUM: ICD-10-CM

## 2025-03-20 DIAGNOSIS — I45.6 WPW (WOLFF-PARKINSON-WHITE SYNDROME): ICD-10-CM

## 2025-03-20 DIAGNOSIS — O44.40 LOW-LYING PLACENTA: ICD-10-CM

## 2025-03-20 DIAGNOSIS — Z34.90 FOURTH PREGNANCY: Primary | ICD-10-CM

## 2025-03-20 PROCEDURE — 76817 TRANSVAGINAL US OBSTETRIC: CPT | Performed by: OBSTETRICS & GYNECOLOGY

## 2025-03-20 PROCEDURE — 76811 OB US DETAILED SNGL FETUS: CPT | Performed by: OBSTETRICS & GYNECOLOGY

## 2025-03-20 NOTE — PROGRESS NOTES
[facility] MATERNAL FETAL MED  [address]  [place] [address]  Dept: [phone]     3/20/2025    RE:  Latisha Haddad     : 2000   AGE: 24 y.o.     Dear Dr. Alcantar,    Thank you for allowing me to see Latisha Haddad.  As I'm sure you will recall, Latisha Haddad is a 24 y.o. [id number]'s last menstrual period was 10/04/2024 (approximate). Estimated Date of Delivery: 25 at 20w2d seen in our office today for the following:    REASON FOR VISIT: Level II    Patient Active Problem List    Diagnosis Date Noted    Family history of congenital heart defect 2025    Hx GDMA1 2022    Anemia affecting pregnancy 2023    Fourth pregnancy 2025    20 weeks gestation of pregnancy 2025    Low-lying placenta 2025     23 F Apg 8/9 Wt 7#9 2023    cHTN (no meds) 2023    Hx cHTN (no meds) 2022    Martha-Parkinson-White 2016        PAST HISTORY:  OB History    Para Term  AB Living   4 2 2  1 2   SAB IAB Ectopic Molar Multiple Live Births   1     2      # Outcome Date GA Lbr Antony/2nd Weight Sex Type Anes PTL Lv   4 Current            3 SAB 2024           2 Term 23 38w5d / 00:15 3.44 kg (7 lb 9.3 oz) F Vag-Spont OTHER N RADHA   1 Term 22 37w6d / 00:08 2.63 kg (5 lb 12.8 oz) M Vag-Spont None N RADHA      Birth Comments: GDMA-1          MEDICAL:  Past Medical History:   Diagnosis Date    Anemia     Antepartum anemia complicating pregnancy 2023    Chronic hypertension during pregnancy 2023    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

## 2025-03-21 ENCOUNTER — TELEPHONE (OUTPATIENT)
Dept: OBGYN CLINIC | Age: 25
End: 2025-03-21

## 2025-03-21 NOTE — TELEPHONE ENCOUNTER
LVM for pt to contact office regarding results.     Pt MFM report from 3/20/25 reviewed by Leslie.Pt with low lying placenta. Pt to begin restrictions: no heavy lifting, and pelvic rest.

## 2025-03-27 ENCOUNTER — TELEPHONE (OUTPATIENT)
Dept: OBGYN CLINIC | Age: 25
End: 2025-03-27

## 2025-03-27 NOTE — TELEPHONE ENCOUNTER
Pt MFM report from 3/20/25 reviewed by Leslie.Pt with low lying placenta. Pt to begin restrictions: no heavy lifting, and pelvic rest.

## 2025-04-03 PROBLEM — O10.919 CHRONIC HYPERTENSION AFFECTING PREGNANCY: Status: RESOLVED | Noted: 2023-01-11 | Resolved: 2025-04-03

## 2025-04-08 ENCOUNTER — TELEPHONE (OUTPATIENT)
Dept: OBGYN CLINIC | Age: 25
End: 2025-04-08

## 2025-04-10 ENCOUNTER — TELEPHONE (OUTPATIENT)
Dept: OBGYN CLINIC | Age: 25
End: 2025-04-10

## 2025-04-15 ENCOUNTER — TELEPHONE (OUTPATIENT)
Dept: OBGYN CLINIC | Age: 25
End: 2025-04-15

## 2025-04-24 ENCOUNTER — HOSPITAL ENCOUNTER (OUTPATIENT)
Age: 25
Setting detail: SPECIMEN
Discharge: HOME | End: 2025-04-24

## 2025-04-24 ENCOUNTER — ROUTINE PRENATAL (OUTPATIENT)
Dept: OBGYN CLINIC | Age: 25
End: 2025-04-24
Payer: COMMERCIAL

## 2025-04-24 VITALS
BODY MASS INDEX: 23.52 KG/M2 | DIASTOLIC BLOOD PRESSURE: 50 MMHG | SYSTOLIC BLOOD PRESSURE: 100 MMHG | HEART RATE: 80 BPM | WEIGHT: 137 LBS

## 2025-04-24 DIAGNOSIS — Z3A.13 13 WEEKS GESTATION OF PREGNANCY: ICD-10-CM

## 2025-04-24 DIAGNOSIS — O09.30 INSUFFICIENT ANTEPARTUM CARE: Primary | ICD-10-CM

## 2025-04-24 DIAGNOSIS — O09.30 LATE PRENATAL CARE: ICD-10-CM

## 2025-04-24 DIAGNOSIS — Z82.79 FAMILY HISTORY OF CONGENITAL HEART DEFECT: ICD-10-CM

## 2025-04-24 DIAGNOSIS — Z34.90 EARLY STAGE OF PREGNANCY: ICD-10-CM

## 2025-04-24 DIAGNOSIS — Z3A.25 25 WEEKS GESTATION OF PREGNANCY: ICD-10-CM

## 2025-04-24 DIAGNOSIS — I45.6 WPW (WOLFF-PARKINSON-WHITE SYNDROME): ICD-10-CM

## 2025-04-24 DIAGNOSIS — O24.410 DIET CONTROLLED GESTATIONAL DIABETES MELLITUS (GDM), ANTEPARTUM: ICD-10-CM

## 2025-04-24 DIAGNOSIS — O10.919 CHRONIC HYPERTENSION AFFECTING PREGNANCY: ICD-10-CM

## 2025-04-24 DIAGNOSIS — O09.92 HIGH-RISK PREGNANCY IN SECOND TRIMESTER: ICD-10-CM

## 2025-04-24 DIAGNOSIS — Z3A.17 17 WEEKS GESTATION OF PREGNANCY: ICD-10-CM

## 2025-04-24 LAB
ABO + RH BLD: NORMAL
BASOPHILS # BLD: 0.08 K/UL (ref 0–0.2)
BASOPHILS NFR BLD: 1 % (ref 0–2)
BLOOD GROUP ANTIBODIES SERPL: NEGATIVE
EOSINOPHIL # BLD: 0.08 K/UL (ref 0–0.44)
EOSINOPHILS RELATIVE PERCENT: 1 % (ref 1–4)
ERYTHROCYTE [DISTWIDTH] IN BLOOD BY AUTOMATED COUNT: 18.2 % (ref 11.8–14.4)
HBV SURFACE AG SERPL QL IA: NONREACTIVE
HCT VFR BLD AUTO: 26 % (ref 36.3–47.1)
HCV AB SERPL QL IA: NONREACTIVE
HGB BLD-MCNC: 7.1 G/DL (ref 11.9–15.1)
HIV 1+2 AB+HIV1 P24 AG SERPL QL IA: NONREACTIVE
IMM GRANULOCYTES # BLD AUTO: 0.08 K/UL (ref 0–0.3)
IMM GRANULOCYTES NFR BLD: 1 %
LYMPHOCYTES NFR BLD: 1.93 K/UL (ref 1.1–3.7)
LYMPHOCYTES RELATIVE PERCENT: 23 % (ref 24–43)
MCH RBC QN AUTO: 19.6 PG (ref 25.2–33.5)
MCHC RBC AUTO-ENTMCNC: 27.3 G/DL (ref 28.4–34.8)
MCV RBC AUTO: 71.8 FL (ref 82.6–102.9)
MONOCYTES NFR BLD: 0.5 K/UL (ref 0.1–1.2)
MONOCYTES NFR BLD: 6 % (ref 3–12)
MORPHOLOGY: ABNORMAL
NEUTROPHILS NFR BLD: 68 % (ref 36–65)
NEUTS SEG NFR BLD: 5.73 K/UL (ref 1.5–8.1)
NRBC BLD-RTO: 0 PER 100 WBC
PLATELET # BLD AUTO: 425 K/UL (ref 138–453)
PMV BLD AUTO: 10.2 FL (ref 8.1–13.5)
RBC # BLD AUTO: 3.62 M/UL (ref 3.95–5.11)
RUBV IGG SERPL QL IA: 151 IU/ML
TSH SERPL DL<=0.05 MIU/L-ACNC: 1.46 UIU/ML (ref 0.27–4.2)
WBC OTHER # BLD: 8.4 K/UL (ref 3.5–11.3)

## 2025-04-24 PROCEDURE — 36415 COLL VENOUS BLD VENIPUNCTURE: CPT | Performed by: NURSE PRACTITIONER

## 2025-04-24 PROCEDURE — 99213 OFFICE O/P EST LOW 20 MIN: CPT | Performed by: NURSE PRACTITIONER

## 2025-04-24 PROCEDURE — G8427 DOCREV CUR MEDS BY ELIG CLIN: HCPCS | Performed by: NURSE PRACTITIONER

## 2025-04-24 PROCEDURE — 1036F TOBACCO NON-USER: CPT | Performed by: NURSE PRACTITIONER

## 2025-04-24 PROCEDURE — 3074F SYST BP LT 130 MM HG: CPT | Performed by: NURSE PRACTITIONER

## 2025-04-24 PROCEDURE — 3078F DIAST BP <80 MM HG: CPT | Performed by: NURSE PRACTITIONER

## 2025-04-24 PROCEDURE — G8420 CALC BMI NORM PARAMETERS: HCPCS | Performed by: NURSE PRACTITIONER

## 2025-04-24 NOTE — PROGRESS NOTES
Latisha Haddad is a 24 y.o. female 25w2d        OB History    Para Term  AB Living   4 2 2  1 2   SAB IAB Ectopic Molar Multiple Live Births   1     2      # Outcome Date GA Lbr Antony/2nd Weight Sex Type Anes PTL Lv   4 Current            3 SAB 2024           2 Term 23 38w5d / 00:15 3.44 kg (7 lb 9.3 oz) F Vag-Spont OTHER N RADHA   1 Term 22 37w6d / 00:08 2.63 kg (5 lb 12.8 oz) M Vag-Spont None N RADHA      Birth Comments: GDMA-1       Vitals  BP: (!) 100/50  Weight - Scale: 62.1 kg (137 lb)  Pulse: 80  Patient Position: Sitting  Albumin: Negative  Glucose: Negative    The patient was seen and evaluated. There was positive fetal movements. No contractions or leakage of fluid. Signs and symptoms of  labor as well as labor were reviewed.The Nuchal Translucency testing was reviewed and found to be  A single marker MSAFP was reviewed and found to be . The patients anatomy ultrasound has been completed and reviewed with patient. TOP ST [place] Reviewed. A 28 week lab panel was ordered. This includes a (HH, 1 hr GTT, U/A C&S). The patient is to complete this in the next two to four weeks.    The following was discussed:  A. Counseling on the incidents of birth defects in the general population being 2-4% and that ultrasound does not diagnose every form of congenital abnormality and has limitations .   B. The only way to evaluate the chromosomal makeup to near 100% certainty is with invasive testing such as chorionic villous sampling or amniocentesis.   C. The options for testing listed in (B) with detail and all risks/benefits and alternatives will be discussed in the high risk setting.   D. The patient was counseled on the benefits of NIPT testing and UNITY-Complete panel after 9 weeks of gestation. NIPT testing (UNITY-Complete) options were discussed with the patient for recessive conditions and aneuploidies. This involves taking a maternal blood sample and

## 2025-04-24 NOTE — PROGRESS NOTES
Patient was in the office today for a prenatal labs .    Draw per physician order using sterile technique.  Drawn from the right AC.

## 2025-04-25 ENCOUNTER — RESULTS FOLLOW-UP (OUTPATIENT)
Dept: OBGYN CLINIC | Age: 25
End: 2025-04-25

## 2025-04-25 LAB — T PALLIDUM AB SER QL IA: NONREACTIVE

## 2025-04-25 NOTE — TELEPHONE ENCOUNTER
----- Message from CHANCE Chapa CNP sent at 4/25/2025  8:21 AM EDT -----  Anemia in pregnancy  Referral to hematology.  Ferrous sulfate 325mg PO BID with 6 refills

## 2025-04-25 NOTE — TELEPHONE ENCOUNTER
LM for patient to call office for results.    ----- Message from CHANCE Chapa CNP sent at 4/25/2025  8:21 AM EDT -----  Anemia in pregnancy  Referral to hematology.  Ferrous sulfate 325mg PO BID with 6 refills

## 2025-04-29 NOTE — TELEPHONE ENCOUNTER
LM for pt to contact office regarding results.     ----- Message from CHANCE Chapa CNP sent at 4/25/2025  8:21 AM EDT -----  Anemia in pregnancy  Referral to hematology.  Ferrous sulfate 325mg PO BID with 6 refills

## 2025-04-30 DIAGNOSIS — O99.012 ANEMIA DURING PREGNANCY IN SECOND TRIMESTER: Primary | ICD-10-CM

## 2025-04-30 RX ORDER — FERROUS SULFATE 325(65) MG
325 TABLET ORAL 2 TIMES DAILY
Qty: 60 TABLET | Refills: 6 | Status: SHIPPED | OUTPATIENT
Start: 2025-04-30

## 2025-04-30 NOTE — TELEPHONE ENCOUNTER
Patient viewed result and recommendation in mychart, detailed message sent to patient as well notifying her of results and recommendations.

## 2025-05-01 LAB
CFTR ALLELE 1 BLD/T QL: NEGATIVE
CFTR ALLELE 2 BLD/T QL: NEGATIVE
CFTR MUT ANL BLD/T: NORMAL
CFTR MUT ANL BLD/T: NORMAL

## 2025-05-02 ENCOUNTER — RESULTS FOLLOW-UP (OUTPATIENT)
Dept: OBGYN CLINIC | Age: 25
End: 2025-05-02

## 2025-05-08 ENCOUNTER — TELEPHONE (OUTPATIENT)
Dept: OBGYN CLINIC | Age: 25
End: 2025-05-08

## 2025-05-08 ENCOUNTER — HOSPITAL ENCOUNTER (OUTPATIENT)
Age: 25
Setting detail: SPECIMEN
Discharge: HOME | End: 2025-05-08

## 2025-05-08 ENCOUNTER — ROUTINE PRENATAL (OUTPATIENT)
Dept: OBGYN CLINIC | Age: 25
End: 2025-05-08
Payer: COMMERCIAL

## 2025-05-08 VITALS
SYSTOLIC BLOOD PRESSURE: 110 MMHG | WEIGHT: 138 LBS | BODY MASS INDEX: 23.69 KG/M2 | HEART RATE: 79 BPM | DIASTOLIC BLOOD PRESSURE: 68 MMHG

## 2025-05-08 DIAGNOSIS — I10 CHRONIC HYPERTENSION: ICD-10-CM

## 2025-05-08 DIAGNOSIS — Z34.90 EARLY STAGE OF PREGNANCY: ICD-10-CM

## 2025-05-08 DIAGNOSIS — Z3A.27 27 WEEKS GESTATION OF PREGNANCY: ICD-10-CM

## 2025-05-08 DIAGNOSIS — Z82.79 FAMILY HISTORY OF CONGENITAL HEART DEFECT: ICD-10-CM

## 2025-05-08 DIAGNOSIS — Z3A.27 27 WEEKS GESTATION OF PREGNANCY: Primary | ICD-10-CM

## 2025-05-08 DIAGNOSIS — O24.410 DIET CONTROLLED GESTATIONAL DIABETES MELLITUS (GDM), ANTEPARTUM: Primary | ICD-10-CM

## 2025-05-08 DIAGNOSIS — Z3A.13 13 WEEKS GESTATION OF PREGNANCY: ICD-10-CM

## 2025-05-08 DIAGNOSIS — D64.9 LOW HEMOGLOBIN: ICD-10-CM

## 2025-05-08 DIAGNOSIS — O99.019 ANEMIA AFFECTING PREGNANCY, ANTEPARTUM: ICD-10-CM

## 2025-05-08 LAB
AMOUNT GLUCOSE GIVEN: NORMAL G
BACTERIA URNS QL MICRO: NORMAL
BASOPHILS # BLD: 0 K/UL (ref 0–0.2)
BASOPHILS NFR BLD: 0 % (ref 0–2)
BILIRUB UR QL STRIP: NEGATIVE
CASTS #/AREA URNS LPF: NORMAL /LPF (ref 0–8)
CLARITY UR: CLEAR
COLOR UR: YELLOW
EOSINOPHIL # BLD: 0 K/UL (ref 0–0.4)
EOSINOPHILS RELATIVE PERCENT: 0 % (ref 1–4)
EPI CELLS #/AREA URNS HPF: NORMAL /HPF (ref 0–5)
ERYTHROCYTE [DISTWIDTH] IN BLOOD BY AUTOMATED COUNT: 17.9 % (ref 11.8–14.4)
GLUCOSE 3H P 100 G GLC PO SERPL-MCNC: 177 MG/DL
GLUCOSE UR STRIP-MCNC: NEGATIVE MG/DL
HCT VFR BLD AUTO: 24.5 % (ref 36.3–47.1)
HGB BLD-MCNC: 6.8 G/DL (ref 11.9–15.1)
HGB UR QL STRIP.AUTO: NEGATIVE
IMM GRANULOCYTES # BLD AUTO: 0 K/UL (ref 0–0.3)
IMM GRANULOCYTES NFR BLD: 0 %
KETONES UR STRIP-MCNC: NEGATIVE MG/DL
LEUKOCYTE ESTERASE UR QL STRIP: NEGATIVE
LYMPHOCYTES NFR BLD: 1.66 K/UL (ref 1–4.8)
LYMPHOCYTES RELATIVE PERCENT: 21 % (ref 24–44)
MCH RBC QN AUTO: 20.1 PG (ref 25.2–33.5)
MCHC RBC AUTO-ENTMCNC: 27.8 G/DL (ref 28.4–34.8)
MCV RBC AUTO: 72.5 FL (ref 82.6–102.9)
MONOCYTES NFR BLD: 0.4 K/UL (ref 0.1–0.8)
MONOCYTES NFR BLD: 5 % (ref 1–7)
MORPHOLOGY: ABNORMAL
NEUTROPHILS NFR BLD: 74 % (ref 36–66)
NEUTS SEG NFR BLD: 5.84 K/UL (ref 1.8–7.7)
NITRITE UR QL STRIP: NEGATIVE
NRBC BLD-RTO: 0 PER 100 WBC
PH UR STRIP: 6.5 [PH] (ref 5–8)
PLATELET # BLD AUTO: 417 K/UL (ref 138–453)
PMV BLD AUTO: 11 FL (ref 8.1–13.5)
PROT UR STRIP-MCNC: ABNORMAL MG/DL
RBC # BLD AUTO: 3.38 M/UL (ref 3.95–5.11)
RBC #/AREA URNS HPF: NORMAL /HPF (ref 0–4)
SP GR UR STRIP: 1.02 (ref 1–1.03)
UROBILINOGEN UR STRIP-ACNC: NORMAL EU/DL (ref 0–1)
WBC #/AREA URNS HPF: NORMAL /HPF (ref 0–5)
WBC OTHER # BLD: 7.9 K/UL (ref 3.5–11.3)

## 2025-05-08 PROCEDURE — 36415 COLL VENOUS BLD VENIPUNCTURE: CPT | Performed by: OBSTETRICS & GYNECOLOGY

## 2025-05-08 PROCEDURE — 3078F DIAST BP <80 MM HG: CPT | Performed by: OBSTETRICS & GYNECOLOGY

## 2025-05-08 PROCEDURE — G8427 DOCREV CUR MEDS BY ELIG CLIN: HCPCS | Performed by: OBSTETRICS & GYNECOLOGY

## 2025-05-08 PROCEDURE — G8420 CALC BMI NORM PARAMETERS: HCPCS | Performed by: OBSTETRICS & GYNECOLOGY

## 2025-05-08 PROCEDURE — 99214 OFFICE O/P EST MOD 30 MIN: CPT | Performed by: OBSTETRICS & GYNECOLOGY

## 2025-05-08 PROCEDURE — 1036F TOBACCO NON-USER: CPT | Performed by: OBSTETRICS & GYNECOLOGY

## 2025-05-08 PROCEDURE — 3074F SYST BP LT 130 MM HG: CPT | Performed by: OBSTETRICS & GYNECOLOGY

## 2025-05-08 RX ORDER — FERROUS SULFATE 325(65) MG
325 TABLET ORAL 2 TIMES DAILY
Qty: 60 TABLET | Refills: 6 | Status: SHIPPED | OUTPATIENT
Start: 2025-05-08

## 2025-05-08 NOTE — PROGRESS NOTES
Patient was in the office today for a 1HOUR GTT AND CBC.    Draw per physician order using sterile technique.  Drawn from the RIGHT AC.

## 2025-05-08 NOTE — TELEPHONE ENCOUNTER
[facility] contacted office regarding pt hemoglobin being critically low (6.8). Labs reviewed with Leslie. Per Leslie pt to go to ER if any symptoms and urgent referral to hematology to be placed. Pt contacted, pt denies any symptoms. Referral placed in EPIC. Pt to go to ED if any symptoms develop. Pt voiced understanding.

## 2025-05-08 NOTE — PROGRESS NOTES
.luke        Latisha Haddad is a 24 y.o. female 27w2d        OB History    Para Term  AB Living   4 2 2  1 2   SAB IAB Ectopic Molar Multiple Live Births   1     2      # Outcome Date GA Lbr Antony/2nd Weight Sex Type Anes PTL Lv   4 Current            3 SAB 2024           2 Term 23 38w5d / 00:15 3.44 kg (7 lb 9.3 oz) F Vag-Spont OTHER N RADHA   1 Term 22 37w6d / 00:08 2.63 kg (5 lb 12.8 oz) M Vag-Spont None N RADHA      Birth Comments: GDMA-1       Vitals  BP: 110/68  Weight - Scale: 62.6 kg (138 lb)  Pulse: 79  Patient Position: Sitting  Albumin: Negative  Glucose: Negative  Fundal Height (cm): 27 cm  Fetal HR: 152  Movement: Present      The patient was seen and evaluated. There was positive fetal movements. No contractions or leakage of fluid. Signs and symptoms of  labor as well as labor were reviewed. The S/S of Pre-Eclampsia were reviewed with the patient in detail. She is to report any of these if they occur. She currently denies any of these.    The patient had her 28 week labs in process.  Hospital Outpatient Visit on 2025   Component Date Value Ref Range Status    Cystic Fibrosis Allele 1 2025 Negative   Final    Cystic Fibrosis Allele 2 2025 Negative   Final    Cysic Fibrosis 5T Variant 2025 Not Applicable   Final    Cystic Fibrosis Panel Interpretati* 2025 0 variants   Final    Comment: (NOTE)  None of the CFTR variants tested using the cystic fibrosis (CF)   variant panel were detected. Please refer to the table below to   determine this asymptomatic individual's post-test risk to be a CF   carrier. If there is a family history of CF, the chart below does   not apply. Please contact an [facility] genetic counselor ([phone]   ) and provide the specific familial variants to determine if   they were tested by this assay. If the familial variants are   unknown, the genetic counselor can perform Bayesian analysis to   determine this

## 2025-05-09 ENCOUNTER — RESULTS FOLLOW-UP (OUTPATIENT)
Dept: OBGYN CLINIC | Age: 25
End: 2025-05-09

## 2025-05-09 NOTE — TELEPHONE ENCOUNTER
LVM for pt. Hematology LVM for pt thi morning to schedule. Pt instructed to call them back to schedule and return our call as well.     Patient was contact yesterday when stat result came in- instructed to go to ER- denies sx's and declined  Desires to follow up with hematology stat   Instructed to go to ER if sx present   Please call patient make sure still not having sx and given number to hematology so she can be scheduled

## 2025-05-09 NOTE — TELEPHONE ENCOUNTER
----- Message from CHANCE Lucero - NP sent at 5/9/2025 11:10 AM EDT -----  Patient was contact yesterday when stat result came in- instructed to go to ER- denies sx's and declined  Desires to follow up with hematology stat   Instructed to go to ER if sx present   Please call patient make sure still not having sx and given number to hematology so she can be scheduled

## 2025-05-29 ENCOUNTER — TELEPHONE (OUTPATIENT)
Dept: OBGYN CLINIC | Age: 25
End: 2025-05-29

## 2025-06-05 ENCOUNTER — HOSPITAL ENCOUNTER (OUTPATIENT)
Age: 25
Setting detail: SPECIMEN
Discharge: HOME | End: 2025-06-05

## 2025-06-05 ENCOUNTER — RESULTS FOLLOW-UP (OUTPATIENT)
Dept: OBGYN CLINIC | Age: 25
End: 2025-06-05

## 2025-06-05 ENCOUNTER — ROUTINE PRENATAL (OUTPATIENT)
Dept: OBGYN CLINIC | Age: 25
End: 2025-06-05

## 2025-06-05 VITALS
DIASTOLIC BLOOD PRESSURE: 50 MMHG | SYSTOLIC BLOOD PRESSURE: 96 MMHG | HEART RATE: 100 BPM | BODY MASS INDEX: 24.37 KG/M2 | WEIGHT: 142 LBS

## 2025-06-05 DIAGNOSIS — O09.30 LATE PRENATAL CARE: ICD-10-CM

## 2025-06-05 DIAGNOSIS — I10 CHRONIC HYPERTENSION: ICD-10-CM

## 2025-06-05 DIAGNOSIS — Z3A.31 31 WEEKS GESTATION OF PREGNANCY: ICD-10-CM

## 2025-06-05 DIAGNOSIS — O99.019 ANEMIA AFFECTING PREGNANCY, ANTEPARTUM: ICD-10-CM

## 2025-06-05 DIAGNOSIS — Z82.79 FAMILY HISTORY OF CONGENITAL HEART DEFECT: ICD-10-CM

## 2025-06-05 DIAGNOSIS — N89.8 VAGINA ITCHING: ICD-10-CM

## 2025-06-05 DIAGNOSIS — R73.09 ABNORMAL GTT (GLUCOSE TOLERANCE TEST): ICD-10-CM

## 2025-06-05 DIAGNOSIS — O09.93 HIGH-RISK PREGNANCY IN THIRD TRIMESTER: ICD-10-CM

## 2025-06-05 DIAGNOSIS — R30.0 DYSURIA: ICD-10-CM

## 2025-06-05 DIAGNOSIS — O24.410 DIET CONTROLLED GESTATIONAL DIABETES MELLITUS (GDM), ANTEPARTUM: ICD-10-CM

## 2025-06-05 DIAGNOSIS — O09.93 HIGH-RISK PREGNANCY IN THIRD TRIMESTER: Primary | ICD-10-CM

## 2025-06-05 LAB
BACTERIA URNS QL MICRO: ABNORMAL
BILIRUB UR QL STRIP: NEGATIVE
CANDIDA SPECIES: POSITIVE
CASTS #/AREA URNS LPF: ABNORMAL /LPF (ref 0–8)
CLARITY UR: CLEAR
COLOR UR: YELLOW
EPI CELLS #/AREA URNS HPF: ABNORMAL /HPF (ref 0–5)
EST. AVERAGE GLUCOSE BLD GHB EST-MCNC: 105 MG/DL
GARDNERELLA VAGINALIS: NEGATIVE
GLUCOSE UR STRIP-MCNC: NEGATIVE MG/DL
HBA1C MFR BLD: 5.3 % (ref 4–6)
HGB UR QL STRIP.AUTO: NEGATIVE
KETONES UR STRIP-MCNC: NEGATIVE MG/DL
LEUKOCYTE ESTERASE UR QL STRIP: ABNORMAL
NITRITE UR QL STRIP: NEGATIVE
PH UR STRIP: 7.5 [PH] (ref 5–8)
PROT UR STRIP-MCNC: NEGATIVE MG/DL
RBC #/AREA URNS HPF: ABNORMAL /HPF (ref 0–4)
SOURCE: ABNORMAL
SP GR UR STRIP: 1.01 (ref 1–1.03)
TRICHOMONAS: NEGATIVE
UROBILINOGEN UR STRIP-ACNC: NORMAL EU/DL (ref 0–1)
WBC #/AREA URNS HPF: ABNORMAL /HPF (ref 0–5)

## 2025-06-05 RX ORDER — FERROUS SULFATE 325(65) MG
325 TABLET ORAL 2 TIMES DAILY
Qty: 60 TABLET | Refills: 6 | Status: SHIPPED | OUTPATIENT
Start: 2025-06-05

## 2025-06-05 NOTE — PROGRESS NOTES
.mbobn    States she did not start the iron- states her pharmacy told her that it would take 6 weeks for the iron to come back in. Has appt on 2025 with hematology- repeat CBC ordered- iron sent to another pharmacy. CBC collected in office. Denies chest pain, diff breathing, sob- instructed to go to ER with sx's. Reviewed if labs come back and have decreased will need to go to ER     States she missed an appt with MFM- states called them and left a voicemail but never got a call back to reschedule       Abnormal 1 hour GTT- 3 hour ordered, A1c ordered- instructed to call lab and schedule     C/o burning with urination/ vaginal itching     Latisha Haddad is a 24 y.o. female 31w2d        OB History    Para Term  AB Living   4 2 2  1 2   SAB IAB Ectopic Molar Multiple Live Births   1     2      # Outcome Date GA Lbr Antony/2nd Weight Sex Type Anes PTL Lv   4 Current            3 SAB 2024           2 Term 23 38w5d / 00:15 3.44 kg (7 lb 9.3 oz) F Vag-Spont OTHER N RADHA   1 Term 22 37w6d / 00:08 2.63 kg (5 lb 12.8 oz) M Vag-Spont None N RADHA      Birth Comments: GDMA-1       Vitals  BP: (!) 96/50  Weight - Scale: 64.4 kg (142 lb)  Pulse: 100  Patient Position: Sitting  Albumin: Negative  Glucose: Negative      The patient was seen and evaluated. There was positive fetal movements. No contractions or leakage of fluid. Signs and symptoms of  labor as well as labor were reviewed. The S/S of Pre-Eclampsia were reviewed with the patient in detail. She is to report any of these if they occur. She currently denies any of these.    The patient had her 28 week labs completed.  Hospital Outpatient Visit on 2025   Component Date Value Ref Range Status    Amount Glucose Given 2025 Unknown  g Final    Glucose, GTT - 1 Hour 2025 177  mg/dL Final    Comment: Refer to the ACOG standards for reference ranges. Spaulding and Coustan Conversion is the   preferred diagnostic

## 2025-06-05 NOTE — PROGRESS NOTES
Patient was in the office today for a A1C AND CBC.    Draw per physician order using sterile technique.  Drawn from the RIGHT AC.

## 2025-06-06 LAB
BASOPHILS # BLD: 0.08 K/UL (ref 0–0.2)
BASOPHILS NFR BLD: 1 % (ref 0–2)
C TRACH DNA SPEC QL PROBE+SIG AMP: NEGATIVE
EOSINOPHIL # BLD: 0.08 K/UL (ref 0–0.44)
EOSINOPHILS RELATIVE PERCENT: 1 % (ref 1–4)
ERYTHROCYTE [DISTWIDTH] IN BLOOD BY AUTOMATED COUNT: 19.2 % (ref 11.8–14.4)
HCT VFR BLD AUTO: 25.3 % (ref 36.3–47.1)
HGB BLD-MCNC: 6.7 G/DL (ref 11.9–15.1)
IMM GRANULOCYTES # BLD AUTO: 0.08 K/UL (ref 0–0.3)
IMM GRANULOCYTES NFR BLD: 1 %
LYMPHOCYTES NFR BLD: 1.88 K/UL (ref 1.1–3.7)
LYMPHOCYTES RELATIVE PERCENT: 25 % (ref 24–43)
MCH RBC QN AUTO: 19.2 PG (ref 25.2–33.5)
MCHC RBC AUTO-ENTMCNC: 26.5 G/DL (ref 28.4–34.8)
MCV RBC AUTO: 72.5 FL (ref 82.6–102.9)
MONOCYTES NFR BLD: 0.68 K/UL (ref 0.1–1.2)
MONOCYTES NFR BLD: 9 % (ref 3–12)
MORPHOLOGY: ABNORMAL
N GONORRHOEA DNA SPEC QL PROBE+SIG AMP: NEGATIVE
NEUTROPHILS NFR BLD: 63 % (ref 36–65)
NEUTS SEG NFR BLD: 4.7 K/UL (ref 1.5–8.1)
NRBC BLD-RTO: 0.5 PER 100 WBC
PLATELET # BLD AUTO: 456 K/UL (ref 138–453)
PMV BLD AUTO: 11.4 FL (ref 8.1–13.5)
RBC # BLD AUTO: 3.49 M/UL (ref 3.95–5.11)
SPECIMEN DESCRIPTION: NORMAL
WBC OTHER # BLD: 7.5 K/UL (ref 3.5–11.3)

## 2025-06-06 RX ORDER — FLUCONAZOLE 150 MG/1
150 TABLET ORAL
Qty: 2 TABLET | Refills: 0 | Status: SHIPPED | OUTPATIENT
Start: 2025-06-06

## 2025-06-06 NOTE — TELEPHONE ENCOUNTER
----- Message from CHANCE Lucero NP sent at 6/6/2025  8:28 AM EDT -----  + yeast  Diflucan 150 mg PO x 1 repeat in 7 days

## 2025-06-11 ENCOUNTER — HOSPITAL ENCOUNTER (EMERGENCY)
Age: 25
Discharge: HOME | End: 2025-06-11
Attending: EMERGENCY MEDICINE
Payer: COMMERCIAL

## 2025-06-11 ENCOUNTER — HOSPITAL ENCOUNTER (OUTPATIENT)
Age: 25
Discharge: HOME | End: 2025-06-11
Attending: OBSTETRICS & GYNECOLOGY | Admitting: STUDENT IN AN ORGANIZED HEALTH CARE EDUCATION/TRAINING PROGRAM
Payer: COMMERCIAL

## 2025-06-11 VITALS
RESPIRATION RATE: 16 BRPM | HEART RATE: 78 BPM | TEMPERATURE: 98.4 F | DIASTOLIC BLOOD PRESSURE: 50 MMHG | SYSTOLIC BLOOD PRESSURE: 89 MMHG | OXYGEN SATURATION: 100 %

## 2025-06-11 VITALS
HEART RATE: 77 BPM | DIASTOLIC BLOOD PRESSURE: 67 MMHG | TEMPERATURE: 98.2 F | RESPIRATION RATE: 14 BRPM | SYSTOLIC BLOOD PRESSURE: 111 MMHG | OXYGEN SATURATION: 100 %

## 2025-06-11 DIAGNOSIS — D64.9 LOW HEMOGLOBIN: Primary | ICD-10-CM

## 2025-06-11 PROBLEM — Z3A.32 32 WEEKS GESTATION OF PREGNANCY: Status: ACTIVE | Noted: 2025-06-11

## 2025-06-11 LAB
ABO + RH BLD: NORMAL
ANION GAP SERPL CALCULATED.3IONS-SCNC: 12 MMOL/L (ref 9–16)
ARM BAND NUMBER: NORMAL
BASOPHILS # BLD: 0.08 K/UL (ref 0–0.2)
BASOPHILS NFR BLD: 1 % (ref 0–2)
BLOOD BANK SAMPLE EXPIRATION: NORMAL
BLOOD GROUP ANTIBODIES SERPL: NEGATIVE
BUN SERPL-MCNC: 5 MG/DL (ref 6–20)
CALCIUM SERPL-MCNC: 8.7 MG/DL (ref 8.6–10.4)
CHLORIDE SERPL-SCNC: 102 MMOL/L (ref 98–107)
CO2 SERPL-SCNC: 21 MMOL/L (ref 20–31)
CREAT SERPL-MCNC: 0.4 MG/DL (ref 0.6–0.9)
EOSINOPHIL # BLD: 0.08 K/UL (ref 0–0.44)
EOSINOPHILS RELATIVE PERCENT: 1 % (ref 1–4)
ERYTHROCYTE [DISTWIDTH] IN BLOOD BY AUTOMATED COUNT: 20.3 % (ref 11.8–14.4)
FERRITIN SERPL-MCNC: 12 NG/ML (ref 15–150)
GFR, ESTIMATED: >90 ML/MIN/1.73M2
GLUCOSE SERPL-MCNC: 80 MG/DL (ref 74–99)
HCT VFR BLD AUTO: 26.2 % (ref 36.3–47.1)
HGB BLD-MCNC: 7.1 G/DL (ref 11.9–15.1)
IMM GRANULOCYTES # BLD AUTO: 0.08 K/UL (ref 0–0.3)
IMM GRANULOCYTES NFR BLD: 1 %
IRON SATN MFR SERPL: 5 % (ref 20–55)
IRON SERPL-MCNC: 30 UG/DL (ref 37–145)
LYMPHOCYTES NFR BLD: 2.16 K/UL (ref 1.1–3.7)
LYMPHOCYTES RELATIVE PERCENT: 27 % (ref 24–43)
MCH RBC QN AUTO: 19.5 PG (ref 25.2–33.5)
MCHC RBC AUTO-ENTMCNC: 27.1 G/DL (ref 28.4–34.8)
MCV RBC AUTO: 72 FL (ref 82.6–102.9)
MONOCYTES NFR BLD: 0.56 K/UL (ref 0.1–1.2)
MONOCYTES NFR BLD: 7 % (ref 3–12)
MORPHOLOGY: ABNORMAL
NEUTROPHILS NFR BLD: 63 % (ref 36–65)
NEUTS SEG NFR BLD: 5.04 K/UL (ref 1.5–8.1)
NRBC BLD-RTO: 0.4 PER 100 WBC
PLATELET # BLD AUTO: 421 K/UL (ref 138–453)
PMV BLD AUTO: 10.3 FL (ref 8.1–13.5)
POTASSIUM SERPL-SCNC: 3.6 MMOL/L (ref 3.7–5.3)
RBC # BLD AUTO: 3.64 M/UL (ref 3.95–5.11)
SODIUM SERPL-SCNC: 135 MMOL/L (ref 136–145)
TIBC SERPL-MCNC: 601 UG/DL (ref 250–450)
UNSATURATED IRON BINDING CAPACITY: 571 UG/DL (ref 112–347)
WBC OTHER # BLD: 8 K/UL (ref 3.5–11.3)

## 2025-06-11 PROCEDURE — 99213 OFFICE O/P EST LOW 20 MIN: CPT

## 2025-06-11 PROCEDURE — 99283 EMERGENCY DEPT VISIT LOW MDM: CPT

## 2025-06-11 PROCEDURE — 86900 BLOOD TYPING SEROLOGIC ABO: CPT

## 2025-06-11 PROCEDURE — 83540 ASSAY OF IRON: CPT

## 2025-06-11 PROCEDURE — 80048 BASIC METABOLIC PNL TOTAL CA: CPT

## 2025-06-11 PROCEDURE — 86901 BLOOD TYPING SEROLOGIC RH(D): CPT

## 2025-06-11 PROCEDURE — 85025 COMPLETE CBC W/AUTO DIFF WBC: CPT

## 2025-06-11 PROCEDURE — 83550 IRON BINDING TEST: CPT

## 2025-06-11 PROCEDURE — 86850 RBC ANTIBODY SCREEN: CPT

## 2025-06-11 PROCEDURE — 82728 ASSAY OF FERRITIN: CPT

## 2025-06-11 RX ORDER — ONDANSETRON 4 MG/1
4 TABLET, ORALLY DISINTEGRATING ORAL EVERY 8 HOURS PRN
Status: DISCONTINUED | OUTPATIENT
Start: 2025-06-11 | End: 2025-06-11 | Stop reason: HOSPADM

## 2025-06-11 RX ORDER — ACETAMINOPHEN 500 MG
1000 TABLET ORAL EVERY 6 HOURS PRN
Status: DISCONTINUED | OUTPATIENT
Start: 2025-06-11 | End: 2025-06-11 | Stop reason: HOSPADM

## 2025-06-11 RX ORDER — ONDANSETRON 2 MG/ML
4 INJECTION INTRAMUSCULAR; INTRAVENOUS EVERY 6 HOURS PRN
Status: DISCONTINUED | OUTPATIENT
Start: 2025-06-11 | End: 2025-06-11 | Stop reason: HOSPADM

## 2025-06-11 ASSESSMENT — PAIN SCALES - GENERAL: PAINLEVEL_OUTOF10: 4

## 2025-06-11 ASSESSMENT — PAIN DESCRIPTION - DESCRIPTORS: DESCRIPTORS: CRAMPING

## 2025-06-11 ASSESSMENT — PAIN DESCRIPTION - LOCATION: LOCATION: ABDOMEN

## 2025-06-11 ASSESSMENT — PAIN - FUNCTIONAL ASSESSMENT: PAIN_FUNCTIONAL_ASSESSMENT: 0-10

## 2025-06-11 NOTE — ED PROVIDER NOTES
[facility] EMERGENCY DEPARTMENT  Emergency Department Encounter  Emergency Medicine Resident     Pt Name:Latisha Haddad  MRN: [id number]  Birthdate 2000  Date of evaluation: 6/11/25  PCP:  [place], Pcp  Note Started: 7:15 AM EDT      CHIEF COMPLAINT       Chief Complaint   Patient presents with    Abnormal Lab     Low hgb       HISTORY OF PRESENT ILLNESS  (Location/Symptom, Timing/Onset, Context/Setting, Quality, Duration, Modifying Factors, Severity.)      Latisha Haddad is a 24 y.o. female with past medical history of anemia who is 32w1d presents for evaluation after being sent by OB/GYN for abnormal outpatient lab results. Patient was found to have a hemoglobin of 6.7 on 6/5.  Per chart review patient's hemoglobin prior to that on 5/8 was 6.8, prior to that 7.1 on 4/24.  Is taking iron pills as recommended by her OB/GYN. States she is having mild abdominal cramping which has been ongoing since the start of her pregnancy, no new, worsening, different pain.  She was scheduled for an MFM appointment however she was not able to make it to that appointment, rescheduled for 6/18.  She does have an appointment scheduled with heme-onc on 6/13.  Patient currently denies any symptoms.  Denies chest pain, shortness of breath, lightheadedness, dizziness, vaginal bleeding or discharge, leakage of fluid, changes in vision, headache, leg swelling.    PAST MEDICAL / SURGICAL / SOCIAL / FAMILY HISTORY      has a past medical history of Anemia, Antepartum anemia complicating pregnancy, Chronic hypertension during pregnancy, Circumvallate placenta, Dizziness, GERD, Gestational diabetes mellitus, Heart defect, Heart disease, Hypertension, Low-lying placenta (RSLVD), Maternal iron deficiency anemia affecting pregnancy in third trimester, antepartum, and Martha-Parkinson-White pattern.       has a past surgical history that includes ablation of dysrhythmic focus and [place] tooth extraction.      Social History     Socioeconomic            PATIENT REFERRED TO:  [facility] Emergency Department  [address] [id number] [phone]    If symptoms worsen    [facility]  [address]  [facility] [address] [phone]  Go to   Your scheduled OB/GYN appointments and appointment with maternal-fetal medicine      DISCHARGE MEDICATIONS:  Discharge Medication List as of 6/11/2025  8:44 AM          Shannen Duenas MD  Emergency Medicine Resident    (Please note that portions of this note were completed with a voice recognition program.  Efforts were made to edit the dictations but occasionally words are mis-transcribed.)

## 2025-06-11 NOTE — PROGRESS NOTES
TESTING NOTE    Latisha Haddad is a 24 y.o. female  at 32w1d    The patient was seen and examined. She is here today for  testing after being seen in the ED for an abnormal outpatient lab result.  The baby is moving well and she denies any complaints.    Patient Active Problem List   Diagnosis    Martha-Parkinson-White    CHTN no meds    Hx GDMA1    Anemia affecting pregnancy    HTN in pregnancy, chronic     23 F Apg 8/9 Wt 7#9    Family history of congenital heart defect    Fourth pregnancy    20 weeks gestation of pregnancy    Low-lying placenta    32 weeks gestation of pregnancy       Vitals:  Vitals:    25 0928   BP: (!) 89/50   Pulse: 78   Resp: 16   Temp: 98.4 °F (36.9 °C)   TempSrc: Oral   SpO2: 100%       NST:   Fetal heart rate baseline: 120, moderate variability, accelerations present, absent decelerations     The tracing has been reviewed and is considered reactive.      Assessment/Plan:  Latisha Haddad is a 24 y.o. female  at 32w1d  NST REACTIVE    - The patient will continue with her scheduled office appointments. Next appointment on 25.    - She will continue with her  testing as scheduled.       - Signs and Symptoms of  labor precautions were reviewed.   - She was counseled on adequate hydration prior to discharge   - The patient was instructed on fetal kick counts.  Discussed results with Dr. Garcia who is agreeable to the above plan.     Randy Coley MD  Ob/Gyn Resident   2025, 9:27 AM

## 2025-06-11 NOTE — FLOWSHEET NOTE
Patient arrived on L&D unit per wheelchair from the ED.  Patient had been seen for low Hgb and on L&D unit for EFM tracing.    Patient reports +FM, denies vaginal bleeding, denies leaking of fluid.    BP 89/50, patient asymptomatic.    EFM applied.

## 2025-06-11 NOTE — CONSULTS
Obstetric/Gynecology Resident Interval Note    Telephone consult placed. Chart reviewed and plan of care discussed.    Patient is a  @ 32w1d here for abnormal outpatient labs. Hgb was noted to be 6.7 on outpatient labs. Repeat Hgb in the ED noted to be 7.1 and patient asymptomatic.     Vitals:    25 0620   BP: 111/67   Pulse: 77   Resp: 14   Temp: 98.2 °F (36.8 °C)   TempSrc: Oral   SpO2: 100%     She has a hematology appointment on . Given asymptomatic anemia and Hgb above 7, will have patient follow up with hematology to determine further care plan. Blood transfusion deferred today. Iron studies added on to labs to facilitate outpatient care with Heme.     Recent Results (from the past 3 hours)   BMP    Collection Time: 25  7:48 AM   Result Value Ref Range    Sodium 135 (L) 136 - 145 mmol/L    Potassium 3.6 (L) 3.7 - 5.3 mmol/L    Chloride 102 98 - 107 mmol/L    CO2 21 20 - 31 mmol/L    Anion Gap 12 9 - 16 mmol/L    Glucose 80 74 - 99 mg/dL    BUN 5 (L) 6 - 20 mg/dL    Creatinine 0.4 (L) 0.6 - 0.9 mg/dL    Est, Glom Filt Rate >90 >60 mL/min/1.73m2    Calcium 8.7 8.6 - 10.4 mg/dL   CBC with Auto Differential    Collection Time: 25  7:48 AM   Result Value Ref Range    WBC 8.0 3.5 - 11.3 k/uL    RBC 3.64 (L) 3.95 - 5.11 m/uL    Hemoglobin 7.1 (L) 11.9 - 15.1 g/dL    Hematocrit 26.2 (L) 36.3 - 47.1 %    MCV 72.0 (L) 82.6 - 102.9 fL    MCH 19.5 (L) 25.2 - 33.5 pg    MCHC 27.1 (L) 28.4 - 34.8 g/dL    RDW 20.3 (H) 11.8 - 14.4 %    Platelets 421 138 - 453 k/uL    MPV 10.3 8.1 - 13.5 fL    NRBC Automated 0.4 (H) 0.0 per 100 WBC    Neutrophils % PENDING %    Lymphocytes % PENDING %    Monocytes % PENDING %    Eosinophils % PENDING %    Basophils % PENDING %    Immature Granulocytes % PENDING 0 %    Neutrophils Absolute PENDING k/uL    Lymphocytes Absolute PENDING k/uL    Monocytes Absolute PENDING k/uL    Eosinophils Absolute PENDING k/uL    Basophils Absolute PENDING 0.0 - 0.2 k/uL    Immature  Granulocytes Absolute PENDING 0.00 - 0.30 k/uL     Patient to come up to L&D for NST prior to leaving the hospital.     Plan discussed with attending    Randy Coley MD  OB/GYN Resident, PGY4  [place], [place]  6/11/2025, 8:21 AM

## 2025-06-11 NOTE — ED NOTES
Pt arrives to ED 18 ambulatory via triage for low hgb level.  Pt states her OB sent her to ED for hgb of 6.8. Pt states she had labs done on 6/5/2025.  Pt reports on and off dizziness and abd cramping.  Pt denies any vaginal bleeding, vaginal discharge.  Pt A&O x4, RR even and unlabored, call light within reach. Whiteboard updated. Will continue plan of care.

## 2025-06-11 NOTE — DISCHARGE INSTRUCTIONS
You were evaluated in the emergency department for low hemoglobin levels. Your repeat lab results show your hemoglobin is now 7.1, which slightly improved from previous. We talked with the OB/GYN team, they do not recommend any blood transfusion at this time.  They do want you to go upstairs for a fetal tracing after your ER visit.     Go to your scheduled appointments with your OB/GYN and maternal-fetal medicine.    Return to the emergency room if you experience any symptoms, chest pain, shortness of breath, lightheadedness, dizziness, headache, changes in vision, feel like you are going to pass out, severe abdominal pain or cramping, vomiting, vaginal bleeding or leakage of fluid, high fever, or if you have any new concerns.

## 2025-06-11 NOTE — ED PROVIDER NOTES
[facility]     Emergency Department     Faculty Attestation  7:14 AM EDT      I performed a history and physical examination of the patient and discussed management with the resident. I have reviewed and agree with the resident’s findings including all diagnostic interpretations, and treatment plans as written. Any areas of disagreement are noted on the chart. I was personally present for the key portions of any procedures. I have documented in the chart those procedures where I was not present during the key portions. I have reviewed the emergency nurses triage note. I agree with the chief complaint, past medical history, past surgical history, allergies, medications, social and family history as documented unless otherwise noted below. Documentation of the HPI, Physical Exam and Medical Decision Making performed by scribes is based on my personal performance of the HPI, PE and MDM. For Physician Assistant/ Nurse Practitioner cases/documentation I have personally evaluated this patient and have completed at least one if not all key elements of the E/M (history, physical exam, and MDM). Additional findings are as noted.    Primary Care Physician: No, Pcp    Patient 31 weeks pregnant. Low hg outpatient lab testing shows hg 6.7, was 6.8 a month ago, and 7.1 the month prior, patient on iron supplentation,   She is , follows with mfm, no chest pain, no sob, no dizziness, no vaginal bleeding, has hematology appointment in 2 days.  Sent by ob office.    On exam she is well appearing, non tachycardic.  Hg noted to 8.1 in pregnancy in .  Patient asymptomatic, gravid uterus is non tender to palpation     Will speak with ob.   Possible transfusion    Connie Rahman D.O, M.P.H  Attending Emergency Medicine Physician         Connie Rahman,   25 0716

## 2025-06-13 ENCOUNTER — TELEPHONE (OUTPATIENT)
Age: 25
End: 2025-06-13

## 2025-06-13 ENCOUNTER — INITIAL CONSULT (OUTPATIENT)
Age: 25
End: 2025-06-13
Payer: COMMERCIAL

## 2025-06-13 VITALS
BODY MASS INDEX: 24.31 KG/M2 | DIASTOLIC BLOOD PRESSURE: 67 MMHG | OXYGEN SATURATION: 100 % | HEART RATE: 84 BPM | SYSTOLIC BLOOD PRESSURE: 108 MMHG | WEIGHT: 141.6 LBS

## 2025-06-13 DIAGNOSIS — O99.019 ANEMIA AFFECTING PREGNANCY, ANTEPARTUM: Primary | ICD-10-CM

## 2025-06-13 DIAGNOSIS — D50.8 IRON DEFICIENCY ANEMIA SECONDARY TO INADEQUATE DIETARY IRON INTAKE: ICD-10-CM

## 2025-06-13 DIAGNOSIS — D58.1 ELLIPTOCYTOSIS: ICD-10-CM

## 2025-06-13 PROBLEM — D50.9 IRON DEFICIENCY ANEMIA: Status: ACTIVE | Noted: 2025-06-13

## 2025-06-13 PROCEDURE — 99215 OFFICE O/P EST HI 40 MIN: CPT | Performed by: INTERNAL MEDICINE

## 2025-06-13 PROCEDURE — 99202 OFFICE O/P NEW SF 15 MIN: CPT | Performed by: INTERNAL MEDICINE

## 2025-06-13 RX ORDER — SODIUM CHLORIDE 9 MG/ML
5-250 INJECTION, SOLUTION INTRAVENOUS PRN
OUTPATIENT
Start: 2025-06-20

## 2025-06-13 RX ORDER — SODIUM CHLORIDE 9 MG/ML
INJECTION, SOLUTION INTRAVENOUS CONTINUOUS
OUTPATIENT
Start: 2025-06-20

## 2025-06-13 RX ORDER — ONDANSETRON 2 MG/ML
8 INJECTION INTRAMUSCULAR; INTRAVENOUS
OUTPATIENT
Start: 2025-06-20

## 2025-06-13 RX ORDER — HYDROCORTISONE SODIUM SUCCINATE 100 MG/2ML
100 INJECTION INTRAMUSCULAR; INTRAVENOUS
OUTPATIENT
Start: 2025-06-20

## 2025-06-13 RX ORDER — EPINEPHRINE 1 MG/ML
0.3 INJECTION, SOLUTION, CONCENTRATE INTRAVENOUS PRN
OUTPATIENT
Start: 2025-06-20

## 2025-06-13 RX ORDER — SODIUM CHLORIDE 0.9 % (FLUSH) 0.9 %
5-40 SYRINGE (ML) INJECTION PRN
OUTPATIENT
Start: 2025-06-20

## 2025-06-13 RX ORDER — DIPHENHYDRAMINE HYDROCHLORIDE 50 MG/ML
50 INJECTION, SOLUTION INTRAMUSCULAR; INTRAVENOUS
OUTPATIENT
Start: 2025-06-20

## 2025-06-13 RX ORDER — ALBUTEROL SULFATE 90 UG/1
4 INHALANT RESPIRATORY (INHALATION) PRN
OUTPATIENT
Start: 2025-06-20

## 2025-06-13 RX ORDER — ACETAMINOPHEN 325 MG/1
650 TABLET ORAL
OUTPATIENT
Start: 2025-06-20

## 2025-06-13 RX ORDER — FAMOTIDINE 10 MG/ML
20 INJECTION, SOLUTION INTRAVENOUS
OUTPATIENT
Start: 2025-06-20

## 2025-06-13 RX ORDER — HEPARIN SODIUM (PORCINE) LOCK FLUSH IV SOLN 100 UNIT/ML 100 UNIT/ML
500 SOLUTION INTRAVENOUS PRN
OUTPATIENT
Start: 2025-06-20

## 2025-06-13 NOTE — PROGRESS NOTES
Patient ID: Latisha Haddad, 2000, [id number], 24 y.o.  Referred by :  Daina Chery*   Reason for consultation: Anemia of pregnancy      HISTORY OF PRESENT ILLNESS:    Oncologic History:    Latisha Haddad is a very pleasant 24 y.o. female.  Who is 32 weeks pregnant and was referred for microcytic anemia, CBC done on 6/11/2025 and hemoglobin 7.1 MCV 72.0 with platelet count at 421, blood smear did show hypochromic and microcytic with elliptocytosis  Iron panel did show iron deficient  Patient did have antepartum anemia complicating pregnancy in her prior pregnancy  Did receive IV iron infusion prior  Currently on oral iron supplement  Feeling weak and fatigue    Past Medical History:   Diagnosis Date    Anemia     Antepartum anemia complicating pregnancy 04/14/2023    Chronic hypertension during pregnancy 06/01/2023    Circumvallate placenta 02/15/2023    Dizziness 11/23/2015    GERD 11/25/2014    Gestational diabetes mellitus     Heart defect     Heart disease     Hypertension     Low-lying placenta (RSLVD) 02/15/2023    2.5 cm from os on MFM Scan 3/15/23      Maternal iron deficiency anemia affecting pregnancy in third trimester, antepartum 04/07/2023    Martha-Parkinson-White pattern 01/25/2013       Past Surgical History:   Procedure Laterality Date    ABLATION OF DYSRHYTHMIC FOCUS      WISDOM TOOTH EXTRACTION         No Known Allergies    Current Outpatient Medications   Medication Sig Dispense Refill    fluconazole (DIFLUCAN) 150 MG tablet Take 1 tablet by mouth every 7 days Repeat dose in one week 2 tablet 0    ferrous sulfate (IRON 325) 325 (65 Fe) MG tablet Take 1 tablet by mouth 2 times daily 60 tablet 6    Prenatal Vit-Fe Fumarate-FA (PRENATAL VITAMINS) 28-0.8 MG TABS Take 1 tablet by mouth daily 30 tablet 11     No current facility-administered medications for this visit.       Social History     Socioeconomic History    Marital status: Single     Spouse name: Not on file    Number of

## 2025-06-13 NOTE — TELEPHONE ENCOUNTER
AVS 06/13/25    Instructions   from Dr. Brian Garcia MD    Iv iron infusion  Rtc in 6 weeks(after done with infusion)    Labs ordered today  Ferritin  Complete this on or around 6/13/2025.  Haptoglobin  Complete this on or around 6/13/2025.  Iron and TIBC  Complete this on or around 6/13/2025.  Lactate Dehydrogenase  Complete this on or around 6/13/2025.  Reticulocytes  Complete this on or around 6/13/2025.  CBC with Auto Differential  Complete this on or around 6/20/2025.    RV 07/25/25    Labs to be done week prior    Pt aware that iv iron needs to go through precert and once approved office will call pt to schedule    PT was given AVS and appt schedule    Electronically signed by Kathrin Cline on 6/13/2025 at 10:52 AM

## 2025-06-18 ENCOUNTER — ROUTINE PRENATAL (OUTPATIENT)
Age: 25
End: 2025-06-18
Payer: COMMERCIAL

## 2025-06-18 VITALS
RESPIRATION RATE: 16 BRPM | DIASTOLIC BLOOD PRESSURE: 49 MMHG | WEIGHT: 143.52 LBS | HEIGHT: 64 IN | BODY MASS INDEX: 24.5 KG/M2 | TEMPERATURE: 98.3 F | SYSTOLIC BLOOD PRESSURE: 102 MMHG | HEART RATE: 100 BPM

## 2025-06-18 DIAGNOSIS — Z34.90 FOURTH PREGNANCY: ICD-10-CM

## 2025-06-18 DIAGNOSIS — Z3A.33 33 WEEKS GESTATION OF PREGNANCY: Primary | ICD-10-CM

## 2025-06-18 DIAGNOSIS — I45.6 WPW (WOLFF-PARKINSON-WHITE SYNDROME): ICD-10-CM

## 2025-06-18 PROBLEM — Z3A.20 20 WEEKS GESTATION OF PREGNANCY: Status: RESOLVED | Noted: 2025-03-20 | Resolved: 2025-06-18

## 2025-06-18 PROCEDURE — 76819 FETAL BIOPHYS PROFIL W/O NST: CPT | Performed by: OBSTETRICS & GYNECOLOGY

## 2025-06-18 PROCEDURE — 76805 OB US >/= 14 WKS SNGL FETUS: CPT | Performed by: OBSTETRICS & GYNECOLOGY

## 2025-06-18 PROCEDURE — 76817 TRANSVAGINAL US OBSTETRIC: CPT | Performed by: OBSTETRICS & GYNECOLOGY

## 2025-06-18 NOTE — PROGRESS NOTES
[facility] MATERNAL FETAL MED  [address]  [place] [address]  Dept: [phone]     2025    RE:  Latisha Haddad     : 2000   AGE: 24 y.o.     Dear Dr. Fernando,    Thank you for allowing me to see Latisha Haddad.  As I'm sure you will recall, Latisha Haddad is a 24 y.o. [id number]'s last menstrual period was 10/04/2024 (approximate). Estimated Date of Delivery: 25 at 33w1d seen in our office today for the following:    REASON FOR VISIT: Growth     Patient Active Problem List    Diagnosis Date Noted    Family history of congenital heart defect 2025    Hx GDMA1 2022    Anemia affecting pregnancy 2023    Iron deficiency anemia 2025    33 weeks gestation of pregnancy 2025    Fourth pregnancy 2025    Low-lying placenta 2025    HTN in pregnancy, chronic 2023     23 F Apg 8/9 Wt 7#9 2023    CHTN no meds 2022    Martha-Parkinson-White 2016        PAST HISTORY:  OB History    Para Term  AB Living   4 2 2  1 2   SAB IAB Ectopic Molar Multiple Live Births   1     2      # Outcome Date GA Lbr Antony/2nd Weight Sex Type Anes PTL Lv   4 Current            3 SAB 2024           2 Term 23 38w5d / 00:15 3.44 kg (7 lb 9.3 oz) F Vag-Spont OTHER N RADHA   1 Term 22 37w6d / 00:08 2.63 kg (5 lb 12.8 oz) M Vag-Spont None N RADHA      Birth Comments: GDMA-1          MEDICAL:  Past Medical History:   Diagnosis Date    Anemia     Antepartum anemia complicating pregnancy 2023    Chronic hypertension during pregnancy 2023    Circumvallate placenta 02/15/2023    Dizziness 2015    GERD 2014    Gestational diabetes mellitus     Heart defect     Heart disease     Hypertension     Low-lying placenta (RSLVD) 02/15/2023    2.5 cm from os on MFM Scan 3/15/23      Maternal iron deficiency anemia affecting pregnancy in third trimester, antepartum

## 2025-06-19 ENCOUNTER — ROUTINE PRENATAL (OUTPATIENT)
Dept: OBGYN CLINIC | Age: 25
End: 2025-06-19
Payer: COMMERCIAL

## 2025-06-19 VITALS
DIASTOLIC BLOOD PRESSURE: 54 MMHG | HEART RATE: 99 BPM | BODY MASS INDEX: 24.2 KG/M2 | WEIGHT: 141 LBS | SYSTOLIC BLOOD PRESSURE: 96 MMHG

## 2025-06-19 DIAGNOSIS — O09.93 HIGH-RISK PREGNANCY IN THIRD TRIMESTER: Primary | ICD-10-CM

## 2025-06-19 DIAGNOSIS — O99.013 ANEMIA AFFECTING PREGNANCY IN THIRD TRIMESTER: ICD-10-CM

## 2025-06-19 DIAGNOSIS — O24.410 DIET CONTROLLED GESTATIONAL DIABETES MELLITUS (GDM), ANTEPARTUM: ICD-10-CM

## 2025-06-19 DIAGNOSIS — Z82.79 FAMILY HISTORY OF CONGENITAL HEART DEFECT: ICD-10-CM

## 2025-06-19 DIAGNOSIS — O44.40 LOW-LYING PLACENTA: ICD-10-CM

## 2025-06-19 DIAGNOSIS — Z3A.33 33 WEEKS GESTATION OF PREGNANCY: ICD-10-CM

## 2025-06-19 DIAGNOSIS — I10 CHRONIC HYPERTENSION: ICD-10-CM

## 2025-06-19 PROCEDURE — 3074F SYST BP LT 130 MM HG: CPT | Performed by: OBSTETRICS & GYNECOLOGY

## 2025-06-19 PROCEDURE — 99214 OFFICE O/P EST MOD 30 MIN: CPT | Performed by: OBSTETRICS & GYNECOLOGY

## 2025-06-19 PROCEDURE — 1036F TOBACCO NON-USER: CPT | Performed by: OBSTETRICS & GYNECOLOGY

## 2025-06-19 PROCEDURE — G8420 CALC BMI NORM PARAMETERS: HCPCS | Performed by: OBSTETRICS & GYNECOLOGY

## 2025-06-19 PROCEDURE — 3078F DIAST BP <80 MM HG: CPT | Performed by: OBSTETRICS & GYNECOLOGY

## 2025-06-19 PROCEDURE — G8427 DOCREV CUR MEDS BY ELIG CLIN: HCPCS | Performed by: OBSTETRICS & GYNECOLOGY

## 2025-06-19 NOTE — PROGRESS NOTES
.luke        Latisha Haddad is a 24 y.o. female 33w2d        OB History    Para Term  AB Living   4 2 2  1 2   SAB IAB Ectopic Molar Multiple Live Births   1     2      # Outcome Date GA Lbr Antony/2nd Weight Sex Type Anes PTL Lv   4 Current            3 SAB 2024           2 Term 23 38w5d / 00:15 3.44 kg (7 lb 9.3 oz) F Vag-Spont OTHER N RADHA   1 Term 22 37w6d / 00:08 2.63 kg (5 lb 12.8 oz) M Vag-Spont None N RADHA      Birth Comments: GDMA-1       Vitals  BP: (!) 96/54  Weight - Scale: 64 kg (141 lb)  Pulse: 99  Patient Position: Sitting  Albumin: Negative  Glucose: Negative  Fundal Height (cm): 33 cm  Fetal HR: 152  Movement: Present      The patient was seen and evaluated. There was positive fetal movements. No contractions or leakage of fluid. Signs and symptoms of  labor as well as labor were reviewed. The S/S of Pre-Eclampsia were reviewed with the patient in detail. She is to report any of these if they occur. She currently denies any of these.    The patient had her 28 week labs completed. Elevated 1 hour  pt to schedule 3 hour GTT  Admission on 2025, Discharged on 2025   Component Date Value Ref Range Status    Sodium 2025 135 (L)  136 - 145 mmol/L Final    Potassium 2025 3.6 (L)  3.7 - 5.3 mmol/L Final    Chloride 2025 102  98 - 107 mmol/L Final    CO2 2025 21  20 - 31 mmol/L Final    Anion Gap 2025 12  9 - 16 mmol/L Final    Glucose 2025 80  74 - 99 mg/dL Final    BUN 2025 5 (L)  6 - 20 mg/dL Final    Creatinine 2025 0.4 (L)  0.6 - 0.9 mg/dL Final    Est, Glom Filt Rate 2025 >90  >60 mL/min/1.73m2 Final    Comment:       These results are not intended for use in patients <18 years of age.        eGFR results are calculated without a race factor using the  CKD-EPI equation.  Careful clinical correlation is recommended, particularly when comparing to results   calculated using previous

## 2025-06-25 ENCOUNTER — HOSPITAL ENCOUNTER (OUTPATIENT)
Dept: INFUSION THERAPY | Age: 25
Setting detail: INFUSION SERIES
Discharge: HOME | End: 2025-06-25
Payer: COMMERCIAL

## 2025-06-25 VITALS
RESPIRATION RATE: 16 BRPM | TEMPERATURE: 97.9 F | HEART RATE: 90 BPM | SYSTOLIC BLOOD PRESSURE: 104 MMHG | DIASTOLIC BLOOD PRESSURE: 63 MMHG | OXYGEN SATURATION: 99 %

## 2025-06-25 DIAGNOSIS — O99.013 ANEMIA AFFECTING PREGNANCY IN THIRD TRIMESTER: ICD-10-CM

## 2025-06-25 DIAGNOSIS — Z3A.33 33 WEEKS GESTATION OF PREGNANCY: Primary | ICD-10-CM

## 2025-06-25 DIAGNOSIS — D50.8 IRON DEFICIENCY ANEMIA SECONDARY TO INADEQUATE DIETARY IRON INTAKE: ICD-10-CM

## 2025-06-25 PROCEDURE — 6360000002 HC RX W HCPCS: Performed by: INTERNAL MEDICINE

## 2025-06-25 PROCEDURE — 96365 THER/PROPH/DIAG IV INF INIT: CPT

## 2025-06-25 PROCEDURE — 2580000003 HC RX 258: Performed by: INTERNAL MEDICINE

## 2025-06-25 PROCEDURE — 96366 THER/PROPH/DIAG IV INF ADDON: CPT

## 2025-06-25 RX ORDER — EPINEPHRINE 1 MG/ML
0.3 INJECTION, SOLUTION, CONCENTRATE INTRAVENOUS PRN
OUTPATIENT
Start: 2025-07-09

## 2025-06-25 RX ORDER — SODIUM CHLORIDE 9 MG/ML
INJECTION, SOLUTION INTRAVENOUS CONTINUOUS
OUTPATIENT
Start: 2025-07-09

## 2025-06-25 RX ORDER — DIPHENHYDRAMINE HYDROCHLORIDE 50 MG/ML
50 INJECTION, SOLUTION INTRAMUSCULAR; INTRAVENOUS
OUTPATIENT
Start: 2025-07-09

## 2025-06-25 RX ORDER — SODIUM CHLORIDE 0.9 % (FLUSH) 0.9 %
5-40 SYRINGE (ML) INJECTION PRN
OUTPATIENT
Start: 2025-07-09

## 2025-06-25 RX ORDER — HEPARIN 100 UNIT/ML
500 SYRINGE INTRAVENOUS PRN
OUTPATIENT
Start: 2025-07-09

## 2025-06-25 RX ORDER — ONDANSETRON 2 MG/ML
8 INJECTION INTRAMUSCULAR; INTRAVENOUS
OUTPATIENT
Start: 2025-07-09

## 2025-06-25 RX ORDER — ACETAMINOPHEN 325 MG/1
650 TABLET ORAL
OUTPATIENT
Start: 2025-07-09

## 2025-06-25 RX ORDER — HYDROCORTISONE SODIUM SUCCINATE 100 MG/2ML
100 INJECTION INTRAMUSCULAR; INTRAVENOUS
OUTPATIENT
Start: 2025-07-09

## 2025-06-25 RX ORDER — SODIUM CHLORIDE 9 MG/ML
5-250 INJECTION, SOLUTION INTRAVENOUS PRN
Status: DISCONTINUED | OUTPATIENT
Start: 2025-06-25 | End: 2025-06-26 | Stop reason: HOSPADM

## 2025-06-25 RX ORDER — SODIUM CHLORIDE 9 MG/ML
5-250 INJECTION, SOLUTION INTRAVENOUS PRN
OUTPATIENT
Start: 2025-07-09

## 2025-06-25 RX ORDER — ALBUTEROL SULFATE 90 UG/1
4 INHALANT RESPIRATORY (INHALATION) PRN
OUTPATIENT
Start: 2025-07-09

## 2025-06-25 RX ADMIN — SODIUM CHLORIDE 150 ML/HR: 0.9 INJECTION, SOLUTION INTRAVENOUS at 09:39

## 2025-06-25 RX ADMIN — IRON SUCROSE 300 MG: 20 INJECTION, SOLUTION INTRAVENOUS at 09:58

## 2025-06-25 NOTE — PROGRESS NOTES
Pt arrived for Venofer infusion.  Vitals obtained and PIV started in L AC.  Infusion started at 09:58 and ended at 12:00.  Pt tolerated well.  No s/s adverse reaction noted.  Vitals remained stable as charted.  PIV removed.  Pt discharged home, ambulatory per self.

## 2025-07-02 ENCOUNTER — TELEPHONE (OUTPATIENT)
Age: 25
End: 2025-07-02

## 2025-07-02 NOTE — TELEPHONE ENCOUNTER
**CALLED PT TO RESCHEDULE APPT, PROVIDER OUT OF OFFICE**     Electronically signed by Kathrin Cline on 7/2/2025 at 10:47 AM

## 2025-07-03 ENCOUNTER — HOSPITAL ENCOUNTER (OUTPATIENT)
Age: 25
Discharge: HOME | End: 2025-07-03
Payer: COMMERCIAL

## 2025-07-03 ENCOUNTER — ROUTINE PRENATAL (OUTPATIENT)
Dept: OBGYN CLINIC | Age: 25
End: 2025-07-03
Payer: COMMERCIAL

## 2025-07-03 VITALS
WEIGHT: 144 LBS | SYSTOLIC BLOOD PRESSURE: 100 MMHG | HEART RATE: 80 BPM | BODY MASS INDEX: 24.72 KG/M2 | DIASTOLIC BLOOD PRESSURE: 60 MMHG

## 2025-07-03 DIAGNOSIS — O44.40 LOW-LYING PLACENTA: ICD-10-CM

## 2025-07-03 DIAGNOSIS — Z3A.35 35 WEEKS GESTATION OF PREGNANCY: ICD-10-CM

## 2025-07-03 DIAGNOSIS — O99.019 ANEMIA AFFECTING PREGNANCY, ANTEPARTUM: ICD-10-CM

## 2025-07-03 DIAGNOSIS — D58.1 ELLIPTOCYTOSIS: ICD-10-CM

## 2025-07-03 DIAGNOSIS — O99.013 ANEMIA AFFECTING PREGNANCY IN THIRD TRIMESTER: ICD-10-CM

## 2025-07-03 DIAGNOSIS — I10 CHRONIC HYPERTENSION: ICD-10-CM

## 2025-07-03 DIAGNOSIS — O24.410 DIET CONTROLLED GESTATIONAL DIABETES MELLITUS (GDM), ANTEPARTUM: ICD-10-CM

## 2025-07-03 DIAGNOSIS — O09.30 LATE PRENATAL CARE: ICD-10-CM

## 2025-07-03 DIAGNOSIS — Z82.79 FAMILY HISTORY OF CONGENITAL HEART DEFECT: ICD-10-CM

## 2025-07-03 DIAGNOSIS — O09.93 HIGH-RISK PREGNANCY IN THIRD TRIMESTER: Primary | ICD-10-CM

## 2025-07-03 LAB
BASOPHILS # BLD: 0 K/UL (ref 0–0.2)
BASOPHILS # BLD: 0 K/UL (ref 0–0.2)
BASOPHILS NFR BLD: 0 % (ref 0–2)
BASOPHILS NFR BLD: 0 % (ref 0–2)
EOSINOPHIL # BLD: 0.06 K/UL (ref 0–0.44)
EOSINOPHIL # BLD: 0.06 K/UL (ref 0–0.44)
EOSINOPHILS RELATIVE PERCENT: 1 % (ref 0–4)
EOSINOPHILS RELATIVE PERCENT: 1 % (ref 0–4)
ERYTHROCYTE [DISTWIDTH] IN BLOOD BY AUTOMATED COUNT: 24.8 % (ref 11.5–14.9)
ERYTHROCYTE [DISTWIDTH] IN BLOOD BY AUTOMATED COUNT: 24.8 % (ref 11.5–14.9)
FERRITIN SERPL-MCNC: 96 NG/ML (ref 15–150)
HAPTOGLOB SERPL-MCNC: 70 MG/DL (ref 30–200)
HCT VFR BLD AUTO: 27.9 % (ref 36–46)
HCT VFR BLD AUTO: 27.9 % (ref 36–46)
HGB BLD-MCNC: 7.7 G/DL (ref 12–16)
HGB BLD-MCNC: 7.7 G/DL (ref 12–16)
IMM GRANULOCYTES # BLD AUTO: 0 K/UL (ref 0–0.3)
IMM GRANULOCYTES # BLD AUTO: 0 K/UL (ref 0–0.3)
IMM GRANULOCYTES NFR BLD: 0 %
IMM GRANULOCYTES NFR BLD: 0 %
IMM RETICS NFR: 29.2 % (ref 2.7–18.3)
IRON SATN MFR SERPL: 5 % (ref 20–55)
IRON SERPL-MCNC: 27 UG/DL (ref 37–145)
LDH SERPL-CCNC: 180 U/L (ref 135–214)
LYMPHOCYTES NFR BLD: 1.48 K/UL (ref 1.1–3.7)
LYMPHOCYTES NFR BLD: 1.48 K/UL (ref 1.1–3.7)
LYMPHOCYTES RELATIVE PERCENT: 26 % (ref 24–44)
LYMPHOCYTES RELATIVE PERCENT: 26 % (ref 24–44)
MCH RBC QN AUTO: 20.5 PG (ref 26–34)
MCH RBC QN AUTO: 20.5 PG (ref 26–34)
MCHC RBC AUTO-ENTMCNC: 27.6 G/DL (ref 31–37)
MCHC RBC AUTO-ENTMCNC: 27.6 G/DL (ref 31–37)
MCV RBC AUTO: 74.4 FL (ref 80–100)
MCV RBC AUTO: 74.4 FL (ref 80–100)
MONOCYTES NFR BLD: 0.34 K/UL (ref 0.1–1.2)
MONOCYTES NFR BLD: 0.34 K/UL (ref 0.1–1.2)
MONOCYTES NFR BLD: 6 % (ref 3–12)
MONOCYTES NFR BLD: 6 % (ref 3–12)
MORPHOLOGY: ABNORMAL
NEUTROPHILS NFR BLD: 67 % (ref 36–66)
NEUTROPHILS NFR BLD: 67 % (ref 36–66)
NEUTS SEG NFR BLD: 3.82 K/UL (ref 1.5–8.1)
NEUTS SEG NFR BLD: 3.82 K/UL (ref 1.5–8.1)
NRBC BLD-RTO: 0 PER 100 WBC
NRBC BLD-RTO: 0 PER 100 WBC
PLATELET # BLD AUTO: 296 K/UL (ref 150–450)
PLATELET # BLD AUTO: 296 K/UL (ref 150–450)
PMV BLD AUTO: 9.9 FL (ref 8–13.5)
PMV BLD AUTO: 9.9 FL (ref 8–13.5)
RBC # BLD AUTO: 3.75 M/UL (ref 3.95–5.11)
RBC # BLD AUTO: 3.75 M/UL (ref 3.95–5.11)
RETIC HEMOGLOBIN: 20.5 PG (ref 28.2–35.7)
RETICS # AUTO: 0.11 M/UL (ref 0.03–0.08)
RETICS/RBC NFR AUTO: 3 % (ref 0.5–2.5)
TIBC SERPL-MCNC: 565 UG/DL (ref 250–450)
UNSATURATED IRON BINDING CAPACITY: 538 UG/DL (ref 112–347)
WBC OTHER # BLD: 5.7 K/UL (ref 3.5–11)
WBC OTHER # BLD: 5.7 K/UL (ref 3.5–11)

## 2025-07-03 PROCEDURE — 83550 IRON BINDING TEST: CPT

## 2025-07-03 PROCEDURE — G8420 CALC BMI NORM PARAMETERS: HCPCS | Performed by: NURSE PRACTITIONER

## 2025-07-03 PROCEDURE — 83540 ASSAY OF IRON: CPT

## 2025-07-03 PROCEDURE — 1036F TOBACCO NON-USER: CPT | Performed by: NURSE PRACTITIONER

## 2025-07-03 PROCEDURE — G8427 DOCREV CUR MEDS BY ELIG CLIN: HCPCS | Performed by: NURSE PRACTITIONER

## 2025-07-03 PROCEDURE — 83010 ASSAY OF HAPTOGLOBIN QUANT: CPT

## 2025-07-03 PROCEDURE — 3078F DIAST BP <80 MM HG: CPT | Performed by: NURSE PRACTITIONER

## 2025-07-03 PROCEDURE — 82728 ASSAY OF FERRITIN: CPT

## 2025-07-03 PROCEDURE — 83615 LACTATE (LD) (LDH) ENZYME: CPT

## 2025-07-03 PROCEDURE — 85045 AUTOMATED RETICULOCYTE COUNT: CPT

## 2025-07-03 PROCEDURE — 3074F SYST BP LT 130 MM HG: CPT | Performed by: NURSE PRACTITIONER

## 2025-07-03 PROCEDURE — 99213 OFFICE O/P EST LOW 20 MIN: CPT | Performed by: NURSE PRACTITIONER

## 2025-07-03 NOTE — PROGRESS NOTES
.mbobn    Reviewed importance of completing labs ordered by hematology. Patient completed transfusion- feeling better.    Reviewed importance of completing 3 hour GTT       Latisha Haddad is a 24 y.o. female 35w2d        OB History    Para Term  AB Living   4 2 2  1 2   SAB IAB Ectopic Molar Multiple Live Births   1     2      # Outcome Date GA Lbr Antony/2nd Weight Sex Type Anes PTL Lv   4 Current            3 SAB 2024           2 Term 23 38w5d / 00:15 3.44 kg (7 lb 9.3 oz) F Vag-Spont OTHER N RADHA   1 Term 22 37w6d / 00:08 2.63 kg (5 lb 12.8 oz) M Vag-Spont None N RADHA      Birth Comments: GDMA-1       Vitals  BP: 100/60  Weight - Scale: 65.3 kg (144 lb)  Pulse: 80  Patient Position: Sitting  Albumin: Trace  Glucose: Negative      The patient was seen and evaluated. There was positive fetal movements. No contractions or leakage of fluid. Signs and symptoms of  labor as well as labor were reviewed. The S/S of Pre-Eclampsia were reviewed with the patient in detail. She is to report any of these if they occur. She currently denies any of these.    The patient had her 28 week labs ordered.  Admission on 2025, Discharged on 2025   Component Date Value Ref Range Status    Sodium 2025 135 (L)  136 - 145 mmol/L Final    Potassium 2025 3.6 (L)  3.7 - 5.3 mmol/L Final    Chloride 2025 102  98 - 107 mmol/L Final    CO2 2025 21  20 - 31 mmol/L Final    Anion Gap 2025 12  9 - 16 mmol/L Final    Glucose 2025 80  74 - 99 mg/dL Final    BUN 2025 5 (L)  6 - 20 mg/dL Final    Creatinine 2025 0.4 (L)  0.6 - 0.9 mg/dL Final    Est, Glom Filt Rate 2025 >90  >60 mL/min/1.73m2 Final    Comment:       These results are not intended for use in patients <18 years of age.        eGFR results are calculated without a race factor using the  CKD-EPI equation.  Careful clinical correlation is recommended, particularly when comparing to

## 2025-07-07 LAB
PATH REV BLD -IMP: NORMAL
SURGICAL PATHOLOGY REPORT: NORMAL

## 2025-07-09 ENCOUNTER — HOSPITAL ENCOUNTER (OUTPATIENT)
Dept: INFUSION THERAPY | Age: 25
Setting detail: INFUSION SERIES
Discharge: HOME | End: 2025-07-09
Payer: COMMERCIAL

## 2025-07-09 VITALS
DIASTOLIC BLOOD PRESSURE: 68 MMHG | RESPIRATION RATE: 16 BRPM | HEART RATE: 78 BPM | TEMPERATURE: 97.2 F | SYSTOLIC BLOOD PRESSURE: 107 MMHG | OXYGEN SATURATION: 99 %

## 2025-07-09 DIAGNOSIS — D50.8 IRON DEFICIENCY ANEMIA SECONDARY TO INADEQUATE DIETARY IRON INTAKE: ICD-10-CM

## 2025-07-09 DIAGNOSIS — O99.013 ANEMIA AFFECTING PREGNANCY IN THIRD TRIMESTER: ICD-10-CM

## 2025-07-09 DIAGNOSIS — Z3A.33 33 WEEKS GESTATION OF PREGNANCY: Primary | ICD-10-CM

## 2025-07-09 PROCEDURE — 6360000002 HC RX W HCPCS: Performed by: INTERNAL MEDICINE

## 2025-07-09 PROCEDURE — 96365 THER/PROPH/DIAG IV INF INIT: CPT

## 2025-07-09 PROCEDURE — 2580000003 HC RX 258: Performed by: INTERNAL MEDICINE

## 2025-07-09 PROCEDURE — 96366 THER/PROPH/DIAG IV INF ADDON: CPT

## 2025-07-09 RX ORDER — HEPARIN 100 UNIT/ML
500 SYRINGE INTRAVENOUS PRN
OUTPATIENT
Start: 2025-07-23

## 2025-07-09 RX ORDER — HYDROCORTISONE SODIUM SUCCINATE 100 MG/2ML
100 INJECTION INTRAMUSCULAR; INTRAVENOUS
OUTPATIENT
Start: 2025-07-23

## 2025-07-09 RX ORDER — SODIUM CHLORIDE 9 MG/ML
5-250 INJECTION, SOLUTION INTRAVENOUS PRN
OUTPATIENT
Start: 2025-07-23

## 2025-07-09 RX ORDER — ONDANSETRON 2 MG/ML
8 INJECTION INTRAMUSCULAR; INTRAVENOUS
OUTPATIENT
Start: 2025-07-23

## 2025-07-09 RX ORDER — ALBUTEROL SULFATE 90 UG/1
4 INHALANT RESPIRATORY (INHALATION) PRN
OUTPATIENT
Start: 2025-07-23

## 2025-07-09 RX ORDER — ACETAMINOPHEN 325 MG/1
650 TABLET ORAL
OUTPATIENT
Start: 2025-07-23

## 2025-07-09 RX ORDER — DIPHENHYDRAMINE HYDROCHLORIDE 50 MG/ML
50 INJECTION, SOLUTION INTRAMUSCULAR; INTRAVENOUS
OUTPATIENT
Start: 2025-07-23

## 2025-07-09 RX ORDER — SODIUM CHLORIDE 9 MG/ML
5-250 INJECTION, SOLUTION INTRAVENOUS PRN
Status: DISCONTINUED | OUTPATIENT
Start: 2025-07-09 | End: 2025-07-10 | Stop reason: HOSPADM

## 2025-07-09 RX ORDER — EPINEPHRINE 1 MG/ML
0.3 INJECTION, SOLUTION, CONCENTRATE INTRAVENOUS PRN
OUTPATIENT
Start: 2025-07-23

## 2025-07-09 RX ORDER — SODIUM CHLORIDE 9 MG/ML
INJECTION, SOLUTION INTRAVENOUS CONTINUOUS
OUTPATIENT
Start: 2025-07-23

## 2025-07-09 RX ORDER — SODIUM CHLORIDE 0.9 % (FLUSH) 0.9 %
5-40 SYRINGE (ML) INJECTION PRN
OUTPATIENT
Start: 2025-07-23

## 2025-07-09 RX ADMIN — SODIUM CHLORIDE 150 ML/HR: 0.9 INJECTION, SOLUTION INTRAVENOUS at 09:27

## 2025-07-09 RX ADMIN — IRON SUCROSE 300 MG: 20 INJECTION, SOLUTION INTRAVENOUS at 09:27

## 2025-07-09 NOTE — PROGRESS NOTES
Pt seen this date for venofer infusion. VS obtained and IV started in L AC. Infusion began at 927 and ended at 1115. Pt tolerated infusion well and discharged home with self.

## 2025-07-10 ENCOUNTER — HOSPITAL ENCOUNTER (OUTPATIENT)
Age: 25
Setting detail: SPECIMEN
Discharge: HOME | End: 2025-07-10

## 2025-07-10 ENCOUNTER — ROUTINE PRENATAL (OUTPATIENT)
Dept: OBGYN CLINIC | Age: 25
End: 2025-07-10
Payer: COMMERCIAL

## 2025-07-10 ENCOUNTER — RESULTS FOLLOW-UP (OUTPATIENT)
Dept: OBGYN CLINIC | Age: 25
End: 2025-07-10

## 2025-07-10 VITALS
SYSTOLIC BLOOD PRESSURE: 90 MMHG | BODY MASS INDEX: 24.72 KG/M2 | DIASTOLIC BLOOD PRESSURE: 50 MMHG | HEART RATE: 90 BPM | WEIGHT: 144 LBS

## 2025-07-10 DIAGNOSIS — O44.40 LOW-LYING PLACENTA: ICD-10-CM

## 2025-07-10 DIAGNOSIS — Z3A.36 36 WEEKS GESTATION OF PREGNANCY: ICD-10-CM

## 2025-07-10 DIAGNOSIS — I10 CHRONIC HYPERTENSION: ICD-10-CM

## 2025-07-10 DIAGNOSIS — O24.410 DIET CONTROLLED GESTATIONAL DIABETES MELLITUS (GDM), ANTEPARTUM: ICD-10-CM

## 2025-07-10 DIAGNOSIS — O09.93 HIGH-RISK PREGNANCY IN THIRD TRIMESTER: ICD-10-CM

## 2025-07-10 DIAGNOSIS — Z82.79 FAMILY HISTORY OF CONGENITAL HEART DEFECT: ICD-10-CM

## 2025-07-10 DIAGNOSIS — O99.013 ANEMIA AFFECTING PREGNANCY IN THIRD TRIMESTER: ICD-10-CM

## 2025-07-10 DIAGNOSIS — O09.93 HIGH-RISK PREGNANCY IN THIRD TRIMESTER: Primary | ICD-10-CM

## 2025-07-10 LAB
BASOPHILS # BLD: 0.06 K/UL (ref 0–0.2)
BASOPHILS NFR BLD: 1 % (ref 0–2)
EOSINOPHIL # BLD: 0.06 K/UL (ref 0–0.44)
EOSINOPHILS RELATIVE PERCENT: 1 % (ref 1–4)
ERYTHROCYTE [DISTWIDTH] IN BLOOD BY AUTOMATED COUNT: 24.3 % (ref 11.8–14.4)
HCT VFR BLD AUTO: 27.4 % (ref 36.3–47.1)
HGB BLD-MCNC: 7.6 G/DL (ref 11.9–15.1)
IMM GRANULOCYTES # BLD AUTO: 0.06 K/UL (ref 0–0.3)
IMM GRANULOCYTES NFR BLD: 1 %
LYMPHOCYTES NFR BLD: 1.71 K/UL (ref 1.1–3.7)
LYMPHOCYTES RELATIVE PERCENT: 28 % (ref 24–43)
MCH RBC QN AUTO: 20.7 PG (ref 25.2–33.5)
MCHC RBC AUTO-ENTMCNC: 27.7 G/DL (ref 28.4–34.8)
MCV RBC AUTO: 74.5 FL (ref 82.6–102.9)
MONOCYTES NFR BLD: 0.49 K/UL (ref 0.1–1.2)
MONOCYTES NFR BLD: 8 % (ref 3–12)
MORPHOLOGY: ABNORMAL
NEUTROPHILS NFR BLD: 61 % (ref 36–65)
NEUTS SEG NFR BLD: 3.72 K/UL (ref 1.5–8.1)
NRBC BLD-RTO: 0 PER 100 WBC
PLATELET # BLD AUTO: 342 K/UL (ref 138–453)
PMV BLD AUTO: 10.9 FL (ref 8.1–13.5)
RBC # BLD AUTO: 3.68 M/UL (ref 3.95–5.11)
WBC OTHER # BLD: 6.1 K/UL (ref 3.5–11.3)

## 2025-07-10 PROCEDURE — G8420 CALC BMI NORM PARAMETERS: HCPCS | Performed by: NURSE PRACTITIONER

## 2025-07-10 PROCEDURE — 3078F DIAST BP <80 MM HG: CPT | Performed by: NURSE PRACTITIONER

## 2025-07-10 PROCEDURE — 1036F TOBACCO NON-USER: CPT | Performed by: NURSE PRACTITIONER

## 2025-07-10 PROCEDURE — 3074F SYST BP LT 130 MM HG: CPT | Performed by: NURSE PRACTITIONER

## 2025-07-10 PROCEDURE — G8427 DOCREV CUR MEDS BY ELIG CLIN: HCPCS | Performed by: NURSE PRACTITIONER

## 2025-07-10 PROCEDURE — 36415 COLL VENOUS BLD VENIPUNCTURE: CPT | Performed by: NURSE PRACTITIONER

## 2025-07-10 PROCEDURE — 99213 OFFICE O/P EST LOW 20 MIN: CPT | Performed by: NURSE PRACTITIONER

## 2025-07-10 NOTE — PROGRESS NOTES
Patient was in the office today for a CBC.    Draw per physician order using sterile technique.  Drawn from the right AC.   
Getting Venofer Infusions      Iron deficiency anemia 2025    33 weeks gestation of pregnancy 2025    Fourth pregnancy 2025    Low-lying placenta: RESOLVED 2025    HTN in pregnancy, chronic 2023     23 F Apg 8/9 Wt 7#9 2023    CHTN no meds 2022    Martha-Parkinson-White 2016     Overview Note:     s/p ablation   Fetal ECHO done and WNL          Diagnosis Orders   1. High-risk pregnancy in third trimester  Culture, Genital (with Gram Stain)    COLLECTION VENOUS BLOOD,VENIPUNCTURE      2. CHTN no meds        3. Hx GDMA1        4. Anemia affecting pregnancy in third trimester  CBC with Auto Differential      5. Family history of congenital heart defect        6. Low-lying placenta: RESOLVED 2025        7. 36 weeks gestation of pregnancy  Culture, Genital (with Gram Stain)                Plan:  The patient will return to the office for her next visit in 1 weeks. See antepartum flow sheet.   Lab collected  Reviewed s/sx of  labor and preeclampsia  Continue FK        The patient, Latisha Haddad is a 24 y.o. female, was seen with a total time spent of 20 minutes for the visit on this date of service by the E/M provider Excluding any time spent for  testing. The time component had both face to face and non face to face time spent in determining the total time component.  Counseling and education regarding her diagnosis listed below and her options regarding those diagnoses were also included in determining her time component.      Diagnosis Orders   1. High-risk pregnancy in third trimester  Culture, Genital (with Gram Stain)    COLLECTION VENOUS BLOOD,VENIPUNCTURE      2. CHTN no meds        3. Hx GDMA1        4. Anemia affecting pregnancy in third trimester  CBC with Auto Differential      5. Family history of congenital heart defect        6. Low-lying placenta: RESOLVED 2025        7. 36 weeks gestation of pregnancy  Culture, Genital (with

## 2025-07-11 NOTE — TELEPHONE ENCOUNTER
----- Message from CHANCE Lucero NP sent at 7/10/2025  4:23 PM EDT -----  Please have patient call hematologist and get scheduled asap  Patient has now had two transfusions without improvement of her CBC  Needs seen stat to develop a plan as she is 36 weeks

## 2025-07-11 NOTE — TELEPHONE ENCOUNTER
MARY ELLENM for pt to contact office ASAP.     Please have patient call hematologist and get scheduled asap  Patient has now had two transfusions without improvement of her CBC  Needs seen stat to develop a plan as she is 36 weeks          2+ b/l/right normal/left normal

## 2025-07-13 LAB
MICROORGANISM SPEC CULT: ABNORMAL
SERVICE CMNT-IMP: ABNORMAL
SPECIMEN DESCRIPTION: ABNORMAL

## 2025-07-15 ENCOUNTER — ROUTINE PRENATAL (OUTPATIENT)
Dept: OBGYN CLINIC | Age: 25
End: 2025-07-15
Payer: COMMERCIAL

## 2025-07-15 VITALS
HEART RATE: 65 BPM | BODY MASS INDEX: 24.37 KG/M2 | WEIGHT: 142 LBS | DIASTOLIC BLOOD PRESSURE: 58 MMHG | SYSTOLIC BLOOD PRESSURE: 100 MMHG

## 2025-07-15 DIAGNOSIS — O09.93 HIGH-RISK PREGNANCY IN THIRD TRIMESTER: Primary | ICD-10-CM

## 2025-07-15 DIAGNOSIS — O24.410 DIET CONTROLLED GESTATIONAL DIABETES MELLITUS (GDM), ANTEPARTUM: ICD-10-CM

## 2025-07-15 DIAGNOSIS — O99.013 ANEMIA AFFECTING PREGNANCY IN THIRD TRIMESTER: ICD-10-CM

## 2025-07-15 DIAGNOSIS — Z3A.37 37 WEEKS GESTATION OF PREGNANCY: ICD-10-CM

## 2025-07-15 DIAGNOSIS — I10 CHRONIC HYPERTENSION: ICD-10-CM

## 2025-07-15 DIAGNOSIS — O44.40 LOW-LYING PLACENTA: ICD-10-CM

## 2025-07-15 DIAGNOSIS — Z82.79 FAMILY HISTORY OF CONGENITAL HEART DEFECT: ICD-10-CM

## 2025-07-15 PROCEDURE — 3078F DIAST BP <80 MM HG: CPT | Performed by: OBSTETRICS & GYNECOLOGY

## 2025-07-15 PROCEDURE — G8427 DOCREV CUR MEDS BY ELIG CLIN: HCPCS | Performed by: OBSTETRICS & GYNECOLOGY

## 2025-07-15 PROCEDURE — 3074F SYST BP LT 130 MM HG: CPT | Performed by: OBSTETRICS & GYNECOLOGY

## 2025-07-15 PROCEDURE — G8420 CALC BMI NORM PARAMETERS: HCPCS | Performed by: OBSTETRICS & GYNECOLOGY

## 2025-07-15 PROCEDURE — 1036F TOBACCO NON-USER: CPT | Performed by: OBSTETRICS & GYNECOLOGY

## 2025-07-15 PROCEDURE — 99214 OFFICE O/P EST MOD 30 MIN: CPT | Performed by: OBSTETRICS & GYNECOLOGY

## 2025-07-15 RX ORDER — FLUCONAZOLE 150 MG/1
150 TABLET ORAL
Qty: 2 TABLET | Refills: 0 | Status: SHIPPED | OUTPATIENT
Start: 2025-07-15

## 2025-07-16 ENCOUNTER — ROUTINE PRENATAL (OUTPATIENT)
Age: 25
End: 2025-07-16
Payer: COMMERCIAL

## 2025-07-16 ENCOUNTER — TELEPHONE (OUTPATIENT)
Age: 25
End: 2025-07-16

## 2025-07-16 VITALS
WEIGHT: 144 LBS | BODY MASS INDEX: 24.59 KG/M2 | HEART RATE: 69 BPM | DIASTOLIC BLOOD PRESSURE: 64 MMHG | SYSTOLIC BLOOD PRESSURE: 108 MMHG | RESPIRATION RATE: 16 BRPM | TEMPERATURE: 97.7 F | HEIGHT: 64 IN

## 2025-07-16 DIAGNOSIS — Z34.90 FOURTH PREGNANCY: ICD-10-CM

## 2025-07-16 DIAGNOSIS — D50.9 IRON DEFICIENCY ANEMIA, UNSPECIFIED IRON DEFICIENCY ANEMIA TYPE: Primary | ICD-10-CM

## 2025-07-16 DIAGNOSIS — Z3A.37 37 WEEKS GESTATION OF PREGNANCY: ICD-10-CM

## 2025-07-16 DIAGNOSIS — O99.013 ANEMIA AFFECTING PREGNANCY IN THIRD TRIMESTER: ICD-10-CM

## 2025-07-16 DIAGNOSIS — I45.6 WPW (WOLFF-PARKINSON-WHITE SYNDROME): ICD-10-CM

## 2025-07-16 PROCEDURE — 76819 FETAL BIOPHYS PROFIL W/O NST: CPT | Performed by: OBSTETRICS & GYNECOLOGY

## 2025-07-16 PROCEDURE — 76816 OB US FOLLOW-UP PER FETUS: CPT | Performed by: OBSTETRICS & GYNECOLOGY

## 2025-07-16 NOTE — TELEPHONE ENCOUNTER
**CALLED PT TO RESCHEDULE APPT, PROVIDER OUT OF OFFICE, COULD NOT LVM NOT SET UP**     Electronically signed by Kathrin Cline on 7/16/2025 at 3:14 PM

## 2025-07-23 ENCOUNTER — HOSPITAL ENCOUNTER (OUTPATIENT)
Dept: INFUSION THERAPY | Age: 25
Setting detail: INFUSION SERIES
Discharge: HOME | End: 2025-07-23
Payer: COMMERCIAL

## 2025-07-23 VITALS
SYSTOLIC BLOOD PRESSURE: 106 MMHG | HEART RATE: 86 BPM | DIASTOLIC BLOOD PRESSURE: 74 MMHG | RESPIRATION RATE: 16 BRPM | OXYGEN SATURATION: 99 % | TEMPERATURE: 96.1 F

## 2025-07-23 DIAGNOSIS — D50.8 IRON DEFICIENCY ANEMIA SECONDARY TO INADEQUATE DIETARY IRON INTAKE: ICD-10-CM

## 2025-07-23 DIAGNOSIS — O99.013 ANEMIA AFFECTING PREGNANCY IN THIRD TRIMESTER: ICD-10-CM

## 2025-07-23 DIAGNOSIS — Z3A.37 37 WEEKS GESTATION OF PREGNANCY: Primary | ICD-10-CM

## 2025-07-23 PROCEDURE — 6360000002 HC RX W HCPCS: Performed by: INTERNAL MEDICINE

## 2025-07-23 PROCEDURE — 96366 THER/PROPH/DIAG IV INF ADDON: CPT

## 2025-07-23 PROCEDURE — 96365 THER/PROPH/DIAG IV INF INIT: CPT

## 2025-07-23 PROCEDURE — 2580000003 HC RX 258: Performed by: INTERNAL MEDICINE

## 2025-07-23 RX ORDER — ONDANSETRON 2 MG/ML
8 INJECTION INTRAMUSCULAR; INTRAVENOUS
OUTPATIENT
Start: 2025-07-23

## 2025-07-23 RX ORDER — ACETAMINOPHEN 325 MG/1
650 TABLET ORAL
OUTPATIENT
Start: 2025-07-23

## 2025-07-23 RX ORDER — EPINEPHRINE 1 MG/ML
0.3 INJECTION, SOLUTION, CONCENTRATE INTRAVENOUS PRN
OUTPATIENT
Start: 2025-07-23

## 2025-07-23 RX ORDER — SODIUM CHLORIDE 0.9 % (FLUSH) 0.9 %
5-40 SYRINGE (ML) INJECTION PRN
OUTPATIENT
Start: 2025-07-23

## 2025-07-23 RX ORDER — ALBUTEROL SULFATE 90 UG/1
4 INHALANT RESPIRATORY (INHALATION) PRN
OUTPATIENT
Start: 2025-07-23

## 2025-07-23 RX ORDER — HEPARIN 100 UNIT/ML
500 SYRINGE INTRAVENOUS PRN
OUTPATIENT
Start: 2025-07-23

## 2025-07-23 RX ORDER — SODIUM CHLORIDE 9 MG/ML
INJECTION, SOLUTION INTRAVENOUS CONTINUOUS
OUTPATIENT
Start: 2025-07-23

## 2025-07-23 RX ORDER — SODIUM CHLORIDE 9 MG/ML
5-250 INJECTION, SOLUTION INTRAVENOUS PRN
OUTPATIENT
Start: 2025-07-23

## 2025-07-23 RX ORDER — DIPHENHYDRAMINE HYDROCHLORIDE 50 MG/ML
50 INJECTION, SOLUTION INTRAMUSCULAR; INTRAVENOUS
OUTPATIENT
Start: 2025-07-23

## 2025-07-23 RX ORDER — HYDROCORTISONE SODIUM SUCCINATE 100 MG/2ML
100 INJECTION INTRAMUSCULAR; INTRAVENOUS
OUTPATIENT
Start: 2025-07-23

## 2025-07-23 RX ORDER — SODIUM CHLORIDE 9 MG/ML
5-250 INJECTION, SOLUTION INTRAVENOUS PRN
Status: DISCONTINUED | OUTPATIENT
Start: 2025-07-23 | End: 2025-07-24 | Stop reason: HOSPADM

## 2025-07-23 RX ADMIN — IRON SUCROSE 400 MG: 20 INJECTION, SOLUTION INTRAVENOUS at 09:16

## 2025-07-23 RX ADMIN — SODIUM CHLORIDE 200 ML/HR: 0.9 INJECTION, SOLUTION INTRAVENOUS at 09:15

## 2025-07-23 NOTE — PROGRESS NOTES
Pt seen this date for ferrlecit infusion. VS obtained and IV started in R arm. Infusion began at 916 and ended at 1155. Pt tolerated infusion well and discharged home.

## 2025-07-24 ENCOUNTER — ROUTINE PRENATAL (OUTPATIENT)
Dept: OBGYN CLINIC | Age: 25
End: 2025-07-24
Payer: COMMERCIAL

## 2025-07-24 VITALS
BODY MASS INDEX: 25.06 KG/M2 | HEART RATE: 67 BPM | SYSTOLIC BLOOD PRESSURE: 102 MMHG | DIASTOLIC BLOOD PRESSURE: 56 MMHG | WEIGHT: 146 LBS

## 2025-07-24 DIAGNOSIS — O24.410 DIET CONTROLLED GESTATIONAL DIABETES MELLITUS (GDM), ANTEPARTUM: ICD-10-CM

## 2025-07-24 DIAGNOSIS — O09.93 HIGH-RISK PREGNANCY IN THIRD TRIMESTER: Primary | ICD-10-CM

## 2025-07-24 DIAGNOSIS — Z82.79 FAMILY HISTORY OF CONGENITAL HEART DEFECT: ICD-10-CM

## 2025-07-24 DIAGNOSIS — O99.013 ANEMIA AFFECTING PREGNANCY IN THIRD TRIMESTER: ICD-10-CM

## 2025-07-24 DIAGNOSIS — Z3A.38 38 WEEKS GESTATION OF PREGNANCY: ICD-10-CM

## 2025-07-24 DIAGNOSIS — I10 CHRONIC HYPERTENSION: ICD-10-CM

## 2025-07-24 DIAGNOSIS — O44.40 LOW-LYING PLACENTA: ICD-10-CM

## 2025-07-24 PROCEDURE — 99214 OFFICE O/P EST MOD 30 MIN: CPT | Performed by: OBSTETRICS & GYNECOLOGY

## 2025-07-24 PROCEDURE — G8419 CALC BMI OUT NRM PARAM NOF/U: HCPCS | Performed by: OBSTETRICS & GYNECOLOGY

## 2025-07-24 PROCEDURE — G8427 DOCREV CUR MEDS BY ELIG CLIN: HCPCS | Performed by: OBSTETRICS & GYNECOLOGY

## 2025-07-24 PROCEDURE — 1036F TOBACCO NON-USER: CPT | Performed by: OBSTETRICS & GYNECOLOGY

## 2025-07-24 PROCEDURE — 3078F DIAST BP <80 MM HG: CPT | Performed by: OBSTETRICS & GYNECOLOGY

## 2025-07-24 PROCEDURE — 3074F SYST BP LT 130 MM HG: CPT | Performed by: OBSTETRICS & GYNECOLOGY

## 2025-07-24 NOTE — PROGRESS NOTES
Latisha Haddad is a 24 y.o. female 38w2d        OB History    Para Term  AB Living   4 2 2  1 2   SAB IAB Ectopic Molar Multiple Live Births   1     2      # Outcome Date GA Lbr Antony/2nd Weight Sex Type Anes PTL Lv   4 Current            3 SAB 2024           2 Term 23 38w5d / 00:15 3.44 kg F Vag-Spont OTHER N RADHA   1 Term 22 37w6d / 00:08 2.63 kg M Vag-Spont None N RADHA      Birth Comments: GDMA-1       Vitals  BP: (!) 102/56  Weight - Scale: 66.2 kg (146 lb)  Pulse: 67  Patient Position: Sitting  Albumin: Negative  Glucose: Negative  Fundal Height (cm): 38 cm  Fetal HR: 152  Movement: Present  Presentation: Vertex  Dilation (cm): 1  Effacement: 50  Station: -1      The patient was seen and evaluated. There was positive fetal movements. No contractions or leakage of fluid. Signs and symptoms of labor were reviewed.  The S/S of Pre-Eclampsia were reviewed with the patient in detail. She is to report any of these if they occur. She currently denies any of these.    The patient was instructed on fetal kick counts and was given a kick sheet to complete every 8 hours. She was instructed that the baby should move at a minimum of ten times within one hour after a meal. The patient was instructed to lay down on her left side twenty minutes after eating and count movements for up to one hour with a target value of ten movements.  She was instructed to notify the office if she did not make that target after two attempts or if after any attempt there was less than four movements.    The patient reports that the targets have been made Yes.    JEFFERSON by TVUS: 2025    High Risk Dx:  lisa rodríguez, family hx of DM; anemia    PRENATAL LAB RESULTS:   Pre-pregnancy: BMI  Blood Type/Rh: A+  Antibody Screen: neg  Hemoglobin, Hematocrit: 7.1/26  Rubella: immune  T. Pallidum, IgG: neg  Hepatitis B Surface Antigen: neg  Hep c: neg  TSH: 1.46  HIV: neg  Sickle Cell Screen: n/a  Gonorrhea:

## 2025-07-28 ENCOUNTER — HOSPITAL ENCOUNTER (OUTPATIENT)
Age: 25
Discharge: HOME | End: 2025-07-28
Payer: COMMERCIAL

## 2025-07-28 DIAGNOSIS — Z3A.31 31 WEEKS GESTATION OF PREGNANCY: ICD-10-CM

## 2025-07-28 DIAGNOSIS — O09.30 LATE PRENATAL CARE: ICD-10-CM

## 2025-07-28 DIAGNOSIS — I10 CHRONIC HYPERTENSION: ICD-10-CM

## 2025-07-28 DIAGNOSIS — O24.410 DIET CONTROLLED GESTATIONAL DIABETES MELLITUS (GDM), ANTEPARTUM: ICD-10-CM

## 2025-07-28 DIAGNOSIS — R73.09 ABNORMAL GTT (GLUCOSE TOLERANCE TEST): ICD-10-CM

## 2025-07-28 DIAGNOSIS — Z82.79 FAMILY HISTORY OF CONGENITAL HEART DEFECT: ICD-10-CM

## 2025-07-28 DIAGNOSIS — O09.93 HIGH-RISK PREGNANCY IN THIRD TRIMESTER: ICD-10-CM

## 2025-07-28 LAB
AMOUNT GLUCOSE GIVEN: 100 G
GLUCOSE 2H P 75 G GLC PO SERPL-MCNC: 117 MG/DL
GLUCOSE TOLERANCE TEST 1 HOUR: 163 MG/DL
GLUCOSE TOLERANCE TEST 2 HOUR: 110 MG/DL
GLUCOSE TOLERANCE TEST 3 HOUR: 101 MG/DL

## 2025-07-28 PROCEDURE — 82951 GLUCOSE TOLERANCE TEST (GTT): CPT

## 2025-07-28 PROCEDURE — 82952 GTT-ADDED SAMPLES: CPT

## 2025-07-28 PROCEDURE — 36415 COLL VENOUS BLD VENIPUNCTURE: CPT

## 2025-07-30 ENCOUNTER — HOSPITAL ENCOUNTER (INPATIENT)
Age: 25
LOS: 2 days | Discharge: HOME | DRG: 560 | End: 2025-08-01
Attending: OBSTETRICS & GYNECOLOGY | Admitting: OBSTETRICS & GYNECOLOGY
Payer: COMMERCIAL

## 2025-07-30 PROBLEM — O10.919 HTN IN PREGNANCY, CHRONIC: Status: RESOLVED | Noted: 2023-06-01 | Resolved: 2025-07-30

## 2025-07-30 PROBLEM — Z34.90 FOURTH PREGNANCY: Status: RESOLVED | Noted: 2025-03-20 | Resolved: 2025-07-30

## 2025-07-30 PROBLEM — Z3A.39 39 WEEKS GESTATION OF PREGNANCY: Status: RESOLVED | Noted: 2025-07-30 | Resolved: 2025-07-30

## 2025-07-30 PROBLEM — Z3A.39 39 WEEKS GESTATION OF PREGNANCY: Status: ACTIVE | Noted: 2025-07-30

## 2025-07-30 PROBLEM — I10 CHRONIC HYPERTENSION: Status: RESOLVED | Noted: 2022-02-23 | Resolved: 2025-07-30

## 2025-07-30 PROBLEM — O44.40 LOW-LYING PLACENTA: Status: RESOLVED | Noted: 2025-03-20 | Resolved: 2025-07-30

## 2025-07-30 LAB
ABO + RH BLD: NORMAL
ARM BAND NUMBER: NORMAL
BASOPHILS # BLD: 0 K/UL (ref 0–0.2)
BASOPHILS NFR BLD: 0 % (ref 0–2)
BLOOD BANK SAMPLE EXPIRATION: NORMAL
BLOOD GROUP ANTIBODIES SERPL: NEGATIVE
EOSINOPHIL # BLD: 0 K/UL (ref 0–0.44)
EOSINOPHILS RELATIVE PERCENT: 0 % (ref 1–4)
ERYTHROCYTE [DISTWIDTH] IN BLOOD BY AUTOMATED COUNT: 28.7 % (ref 11.8–14.4)
HCT VFR BLD AUTO: 31.7 % (ref 36.3–47.1)
HGB BLD-MCNC: 9.2 G/DL (ref 11.9–15.1)
IMM GRANULOCYTES # BLD AUTO: 0.16 K/UL (ref 0–0.3)
IMM GRANULOCYTES NFR BLD: 1 %
LYMPHOCYTES NFR BLD: 0.82 K/UL (ref 1.1–3.7)
LYMPHOCYTES RELATIVE PERCENT: 5 % (ref 24–43)
MCH RBC QN AUTO: 22.9 PG (ref 25.2–33.5)
MCHC RBC AUTO-ENTMCNC: 29 G/DL (ref 28.4–34.8)
MCV RBC AUTO: 79.1 FL (ref 82.6–102.9)
MONOCYTES NFR BLD: 0.98 K/UL (ref 0.1–1.2)
MONOCYTES NFR BLD: 6 % (ref 3–12)
MORPHOLOGY: ABNORMAL
NEUTROPHILS NFR BLD: 88 % (ref 36–65)
NEUTS SEG NFR BLD: 14.34 K/UL (ref 1.5–8.1)
NRBC BLD-RTO: 0 PER 100 WBC
PLATELET # BLD AUTO: 321 K/UL (ref 138–453)
PMV BLD AUTO: 10.1 FL (ref 8.1–13.5)
RBC # BLD AUTO: 4.01 M/UL (ref 3.95–5.11)
T PALLIDUM AB SER QL IA: NONREACTIVE
WBC OTHER # BLD: 16.3 K/UL (ref 3.5–11.3)

## 2025-07-30 PROCEDURE — 10907ZC DRAINAGE OF AMNIOTIC FLUID, THERAPEUTIC FROM PRODUCTS OF CONCEPTION, VIA NATURAL OR ARTIFICIAL OPENING: ICD-10-PCS | Performed by: OBSTETRICS & GYNECOLOGY

## 2025-07-30 PROCEDURE — 88307 TISSUE EXAM BY PATHOLOGIST: CPT

## 2025-07-30 PROCEDURE — 86780 TREPONEMA PALLIDUM: CPT

## 2025-07-30 PROCEDURE — 85025 COMPLETE CBC W/AUTO DIFF WBC: CPT

## 2025-07-30 PROCEDURE — 6370000000 HC RX 637 (ALT 250 FOR IP)

## 2025-07-30 PROCEDURE — 2500000003 HC RX 250 WO HCPCS

## 2025-07-30 PROCEDURE — 86850 RBC ANTIBODY SCREEN: CPT

## 2025-07-30 PROCEDURE — 2580000003 HC RX 258

## 2025-07-30 PROCEDURE — 6360000002 HC RX W HCPCS

## 2025-07-30 PROCEDURE — 7200000001 HC VAGINAL DELIVERY

## 2025-07-30 PROCEDURE — 59409 OBSTETRICAL CARE: CPT | Performed by: OBSTETRICS & GYNECOLOGY

## 2025-07-30 PROCEDURE — 86901 BLOOD TYPING SEROLOGIC RH(D): CPT

## 2025-07-30 PROCEDURE — 1220000000 HC SEMI PRIVATE OB R&B

## 2025-07-30 PROCEDURE — 86900 BLOOD TYPING SEROLOGIC ABO: CPT

## 2025-07-30 RX ORDER — IBUPROFEN 600 MG/1
600 TABLET, FILM COATED ORAL EVERY 6 HOURS PRN
Status: DISCONTINUED | OUTPATIENT
Start: 2025-07-30 | End: 2025-07-30

## 2025-07-30 RX ORDER — SODIUM CHLORIDE, SODIUM LACTATE, POTASSIUM CHLORIDE, AND CALCIUM CHLORIDE .6; .31; .03; .02 G/100ML; G/100ML; G/100ML; G/100ML
500 INJECTION, SOLUTION INTRAVENOUS PRN
Status: DISCONTINUED | OUTPATIENT
Start: 2025-07-30 | End: 2025-07-30

## 2025-07-30 RX ORDER — ACETAMINOPHEN 500 MG
1000 TABLET ORAL EVERY 6 HOURS
Status: DISCONTINUED | OUTPATIENT
Start: 2025-07-30 | End: 2025-08-01 | Stop reason: HOSPADM

## 2025-07-30 RX ORDER — ONDANSETRON 4 MG/1
4 TABLET, ORALLY DISINTEGRATING ORAL EVERY 6 HOURS PRN
Status: DISCONTINUED | OUTPATIENT
Start: 2025-07-30 | End: 2025-08-01 | Stop reason: HOSPADM

## 2025-07-30 RX ORDER — ACETAMINOPHEN 500 MG
1000 TABLET ORAL EVERY 6 HOURS PRN
Status: DISCONTINUED | OUTPATIENT
Start: 2025-07-30 | End: 2025-07-30

## 2025-07-30 RX ORDER — ONDANSETRON 2 MG/ML
4 INJECTION INTRAMUSCULAR; INTRAVENOUS EVERY 6 HOURS PRN
Status: DISCONTINUED | OUTPATIENT
Start: 2025-07-30 | End: 2025-08-01 | Stop reason: HOSPADM

## 2025-07-30 RX ORDER — LANOLIN
CREAM (ML) TOPICAL PRN
Status: DISCONTINUED | OUTPATIENT
Start: 2025-07-30 | End: 2025-08-01 | Stop reason: HOSPADM

## 2025-07-30 RX ORDER — SODIUM CHLORIDE 9 MG/ML
INJECTION, SOLUTION INTRAVENOUS PRN
Status: DISCONTINUED | OUTPATIENT
Start: 2025-07-30 | End: 2025-08-01 | Stop reason: HOSPADM

## 2025-07-30 RX ORDER — SIMETHICONE 80 MG
80 TABLET,CHEWABLE ORAL EVERY 6 HOURS PRN
Status: DISCONTINUED | OUTPATIENT
Start: 2025-07-30 | End: 2025-07-30

## 2025-07-30 RX ORDER — SIMETHICONE 80 MG
80 TABLET,CHEWABLE ORAL EVERY 6 HOURS PRN
Status: DISCONTINUED | OUTPATIENT
Start: 2025-07-30 | End: 2025-08-01 | Stop reason: HOSPADM

## 2025-07-30 RX ORDER — SODIUM CHLORIDE, SODIUM LACTATE, POTASSIUM CHLORIDE, CALCIUM CHLORIDE 600; 310; 30; 20 MG/100ML; MG/100ML; MG/100ML; MG/100ML
INJECTION, SOLUTION INTRAVENOUS CONTINUOUS
Status: DISCONTINUED | OUTPATIENT
Start: 2025-07-30 | End: 2025-07-30

## 2025-07-30 RX ORDER — IBUPROFEN 600 MG/1
600 TABLET, FILM COATED ORAL EVERY 6 HOURS
Status: DISCONTINUED | OUTPATIENT
Start: 2025-07-30 | End: 2025-08-01 | Stop reason: HOSPADM

## 2025-07-30 RX ORDER — SODIUM CHLORIDE 0.9 % (FLUSH) 0.9 %
5-40 SYRINGE (ML) INJECTION PRN
Status: DISCONTINUED | OUTPATIENT
Start: 2025-07-30 | End: 2025-08-01 | Stop reason: HOSPADM

## 2025-07-30 RX ORDER — SODIUM CHLORIDE 0.9 % (FLUSH) 0.9 %
5-40 SYRINGE (ML) INJECTION EVERY 12 HOURS SCHEDULED
Status: DISCONTINUED | OUTPATIENT
Start: 2025-07-30 | End: 2025-08-01 | Stop reason: HOSPADM

## 2025-07-30 RX ORDER — ONDANSETRON 4 MG/1
4 TABLET, ORALLY DISINTEGRATING ORAL EVERY 6 HOURS PRN
Status: DISCONTINUED | OUTPATIENT
Start: 2025-07-30 | End: 2025-07-30

## 2025-07-30 RX ORDER — SENNA AND DOCUSATE SODIUM 50; 8.6 MG/1; MG/1
1 TABLET, FILM COATED ORAL DAILY
Status: DISCONTINUED | OUTPATIENT
Start: 2025-07-30 | End: 2025-08-01 | Stop reason: HOSPADM

## 2025-07-30 RX ORDER — DIPHENHYDRAMINE HCL 25 MG
25 TABLET ORAL EVERY 4 HOURS PRN
Status: DISCONTINUED | OUTPATIENT
Start: 2025-07-30 | End: 2025-07-30

## 2025-07-30 RX ORDER — LOPERAMIDE HYDROCHLORIDE 2 MG/1
2 CAPSULE ORAL PRN
Status: DISCONTINUED | OUTPATIENT
Start: 2025-07-30 | End: 2025-07-30

## 2025-07-30 RX ORDER — ACETAMINOPHEN 500 MG
1000 TABLET ORAL EVERY 6 HOURS PRN
Qty: 60 TABLET | Refills: 0 | Status: SHIPPED | OUTPATIENT
Start: 2025-07-30 | End: 2025-08-29

## 2025-07-30 RX ORDER — ONDANSETRON 2 MG/ML
4 INJECTION INTRAMUSCULAR; INTRAVENOUS EVERY 6 HOURS PRN
Status: DISCONTINUED | OUTPATIENT
Start: 2025-07-30 | End: 2025-07-30

## 2025-07-30 RX ORDER — BISACODYL 10 MG
10 SUPPOSITORY, RECTAL RECTAL DAILY PRN
Status: DISCONTINUED | OUTPATIENT
Start: 2025-07-30 | End: 2025-08-01 | Stop reason: HOSPADM

## 2025-07-30 RX ORDER — FERROUS SULFATE 325(65) MG
325 TABLET, DELAYED RELEASE (ENTERIC COATED) ORAL 2 TIMES DAILY
Status: DISCONTINUED | OUTPATIENT
Start: 2025-07-30 | End: 2025-08-01 | Stop reason: HOSPADM

## 2025-07-30 RX ORDER — ACETAMINOPHEN 500 MG
1000 TABLET ORAL EVERY 6 HOURS PRN
Qty: 60 TABLET | Refills: 0 | Status: SHIPPED | OUTPATIENT
Start: 2025-07-30 | End: 2025-07-30

## 2025-07-30 RX ORDER — IBUPROFEN 600 MG/1
600 TABLET, FILM COATED ORAL EVERY 6 HOURS PRN
Qty: 120 TABLET | Refills: 0 | Status: SHIPPED | OUTPATIENT
Start: 2025-07-30 | End: 2025-08-29

## 2025-07-30 RX ORDER — SODIUM CHLORIDE 9 MG/ML
INJECTION, SOLUTION INTRAVENOUS PRN
Status: DISCONTINUED | OUTPATIENT
Start: 2025-07-30 | End: 2025-07-30

## 2025-07-30 RX ORDER — SENNA AND DOCUSATE SODIUM 50; 8.6 MG/1; MG/1
1 TABLET, FILM COATED ORAL DAILY
Qty: 30 TABLET | Refills: 1 | Status: SHIPPED | OUTPATIENT
Start: 2025-07-30 | End: 2025-07-30

## 2025-07-30 RX ORDER — LOPERAMIDE HYDROCHLORIDE 2 MG/1
2 CAPSULE ORAL PRN
Status: DISCONTINUED | OUTPATIENT
Start: 2025-07-30 | End: 2025-08-01 | Stop reason: HOSPADM

## 2025-07-30 RX ORDER — SENNA AND DOCUSATE SODIUM 50; 8.6 MG/1; MG/1
1 TABLET, FILM COATED ORAL DAILY
Qty: 30 TABLET | Refills: 1 | Status: SHIPPED | OUTPATIENT
Start: 2025-07-30

## 2025-07-30 RX ORDER — DIPHENHYDRAMINE HYDROCHLORIDE 50 MG/ML
25 INJECTION, SOLUTION INTRAMUSCULAR; INTRAVENOUS EVERY 4 HOURS PRN
Status: DISCONTINUED | OUTPATIENT
Start: 2025-07-30 | End: 2025-07-30

## 2025-07-30 RX ORDER — IBUPROFEN 600 MG/1
600 TABLET, FILM COATED ORAL EVERY 6 HOURS PRN
Qty: 120 TABLET | Refills: 0 | Status: SHIPPED | OUTPATIENT
Start: 2025-07-30 | End: 2025-07-30

## 2025-07-30 RX ORDER — FERROUS SULFATE 325(65) MG
325 TABLET ORAL 2 TIMES DAILY
Qty: 60 TABLET | Refills: 6 | Status: SHIPPED | OUTPATIENT
Start: 2025-07-30

## 2025-07-30 RX ORDER — VITAMIN A, ASCORBIC ACID, CHOLECALCIFEROL, .ALPHA.-TOCOPHEROL ACETATE, DL-, THIAMINE MONONITRATE, RIBOFLAVIN, NIACINAMIDE, PYRIDOXINE HYDROCHLORIDE, FOLIC ACID, CYANOCOBALAMIN, CALCIUM CARBONATE, IRON, ZINC OXIDE, AND CUPRIC OXIDE 4000; 120; 400; 22; 1.84; 3; 20; 10; 1; 12; 200; 29; 25; 2 [IU]/1; MG/1; [IU]/1; [IU]/1; MG/1; MG/1; MG/1; MG/1; MG/1; UG/1; MG/1; MG/1; MG/1; MG/1
1 TABLET ORAL DAILY
Status: DISCONTINUED | OUTPATIENT
Start: 2025-07-30 | End: 2025-08-01 | Stop reason: HOSPADM

## 2025-07-30 RX ORDER — SODIUM CHLORIDE 0.9 % (FLUSH) 0.9 %
5-40 SYRINGE (ML) INJECTION EVERY 12 HOURS SCHEDULED
Status: DISCONTINUED | OUTPATIENT
Start: 2025-07-30 | End: 2025-07-30

## 2025-07-30 RX ADMIN — SODIUM CHLORIDE, SODIUM LACTATE, POTASSIUM CHLORIDE, AND CALCIUM CHLORIDE: .6; .31; .03; .02 INJECTION, SOLUTION INTRAVENOUS at 01:31

## 2025-07-30 RX ADMIN — FERROUS SULFATE TAB EC 325 MG (65 MG FE EQUIVALENT) 325 MG: 325 (65 FE) TABLET DELAYED RESPONSE at 08:15

## 2025-07-30 RX ADMIN — ACETAMINOPHEN 1000 MG: 500 TABLET ORAL at 05:01

## 2025-07-30 RX ADMIN — ACETAMINOPHEN 1000 MG: 500 TABLET ORAL at 11:48

## 2025-07-30 RX ADMIN — SODIUM CHLORIDE, PRESERVATIVE FREE 10 ML: 5 INJECTION INTRAVENOUS at 08:15

## 2025-07-30 RX ADMIN — Medication 1 TABLET: at 08:15

## 2025-07-30 RX ADMIN — ACETAMINOPHEN 1000 MG: 500 TABLET ORAL at 23:43

## 2025-07-30 RX ADMIN — Medication 166.7 ML: at 02:37

## 2025-07-30 RX ADMIN — IBUPROFEN 600 MG: 600 TABLET ORAL at 03:40

## 2025-07-30 RX ADMIN — SENNOSIDES, DOCUSATE SODIUM 1 TABLET: 50; 8.6 TABLET, FILM COATED ORAL at 08:15

## 2025-07-30 RX ADMIN — MAGNESIUM HYDROXIDE 30 ML: 400 SUSPENSION ORAL at 16:55

## 2025-07-30 RX ADMIN — FERROUS SULFATE TAB EC 325 MG (65 MG FE EQUIVALENT) 325 MG: 325 (65 FE) TABLET DELAYED RESPONSE at 20:19

## 2025-07-30 RX ADMIN — SODIUM CHLORIDE, PRESERVATIVE FREE 10 ML: 5 INJECTION INTRAVENOUS at 20:19

## 2025-07-30 RX ADMIN — PENICILLIN G POTASSIUM 5 MILLION UNITS: 5000000 INJECTION, POWDER, FOR SOLUTION INTRAMUSCULAR; INTRAVENOUS at 01:31

## 2025-07-30 ASSESSMENT — PAIN DESCRIPTION - LOCATION
LOCATION: ABDOMEN

## 2025-07-30 ASSESSMENT — PAIN DESCRIPTION - ORIENTATION: ORIENTATION: LOWER

## 2025-07-30 ASSESSMENT — PAIN SCALES - GENERAL
PAINLEVEL_OUTOF10: 6
PAINLEVEL_OUTOF10: 9

## 2025-07-30 ASSESSMENT — PAIN DESCRIPTION - DESCRIPTORS: DESCRIPTORS: CRAMPING

## 2025-07-30 NOTE — L&D DELIVERY NOTE
Catie Baby Pending Latisha [[id number]]      Labor Events     Labor: No   Steroids: None  Cervical Ripening Date/Time:      Antibiotics Received during Labor: Yes  Rupture Date/Time:  25 02:05:00   Rupture Type: AROM  Fluid Color: Pink  Fluid Odor: None  Induction: None  Augmentation: AROM  Labor Complications: None, Meconium at birth       Anesthesia    Method: None       Labor Event Times      Labor onset date/time:        Dilation complete date/time:  25 02:23:00 EDT     Start pushing date/time:     Decision date/time (emergent ):            Delivery Details      Delivery Date: 25 Delivery Time: 02:32:00   Delivery Type: Vaginal, Spontaneous               Presentation    Presentation: Vertex  Position: Right  _: Occiput  _: Anterior       Shoulder Dystocia    Shoulder Dystocia Present?: No       Assisted Delivery Details    Forceps Attempted?: No  Vacuum Extractor Attempted?: No                           Cord    Vessels: 3 Vessels  Complications: None  Delayed Cord Clamping?: Yes  Cord Clamped Date/Time: 2025 02:33:00  Cord Blood Disposition: Lab  Gases Sent?: Yes              Placenta    Date/Time: 2025 02:37:33  Removal: Spontaneous  Appearance: Intact  Disposition: Pathology       Lacerations    Episiotomy: None  Perineal Lacerations: None  Other Lacerations: no non-perineal laceration  Number of Repair Packets: 0       Vaginal Counts    Initial Count Personnel: ARETHA  Initial Count Verified By: RASHARD MARIN  Intial Sponge Count: Correct  Intial Instruments Count: Correct   Final Sponges Count: Correct  Final Instruments Count: Correct   Final Count Personnel: JAQUI  Final Count Verified By: RASHARD MARIN  Accurate Final Count?: Yes       Blood Loss  Mother: Latisha Haddad #[id number]     Start of Mother's Information      Delivery Blood Loss   Intrapartum & Postpartum: 25 - 25    Delivery Admission: 25 - 25         Intrapartum &

## 2025-07-30 NOTE — H&P
OBSTETRICAL HISTORY AND PHYSICAL  [facility]    Date: 2025       Time: 3:02 AM   Patient Name: Latisha Haddad     Patient : 2000  Room/Bed: 0704/0704-01    Admission Date/Time: 2025 12:23 AM      CC: Contractions     HPI: Latisha Haddad is a 24 y.o.  at 39w1d who presents reporting contractions. She reports feeling contractions since this AM. She states they are increasing in intensity and frequency.       The patient reports fetal movement is present, complains of contractions, denies loss of fluid, denies vaginal bleeding. Patient denies headache, vision changes, nausea, vomiting, fever, chills, shortness of breath, chest pain, RUQ pain, abdominal pain, diarrhea, change in color/amount/odor of vaginal discharge, dysuria or, hematuria.       DATING:  LMP: Patient's last menstrual period was 10/04/2024 (approximate).  Estimated Date of Delivery: 25   Based on: ultrasound, at 13 2/7 weeks GA    PREGNANCY RISK FACTORS:  Patient Active Problem List   Diagnosis    Martha-Parkinson-White    Hx GDMA1    Anemia affecting pregnancy     23 F Apg 8/9 Wt 7#9    Family history of congenital heart defect    37 weeks gestation of pregnancy    Iron deficiency anemia    Normal labor and delivery        Steroids Given In This Pregnancy:  no     REVIEW OF SYSTEMS:   Constitutional: negative fever, negative chills, negative weight changes   HEENT: negative visual disturbances, negative headaches, negative dizziness, negative hearing loss  Breast: Negative breast abnormalities, negative breast lumps, negative nipple discharge  Respiratory: negative dyspnea, negative cough, negative SOB  Cardiovascular: negative chest pain,  negative palpitations, negative arrhythmia, negative syncope   Gastrointestinal: negative abdominal pain, negative RUQ pain, negative N/V, negative diarrhea, negative constipation, negative bowel changes, negative heartburn   Genitourinary: negative dysuria,

## 2025-07-30 NOTE — FLOWSHEET NOTE
Patient admitted to room 747 from 704 via wheelchair.   Oriented to room and surroundings.  Plan of care reviewed.  Verbalized understanding.  Instructed on infant security and safe sleep practices.  Preventing falls education provided .The following handouts given: A New Beginning: Your Guide to Postpartum Care, Rounding, gs Security System,Babies Cry A lot, Safe Sleep, Security and Visitation Guidelines.   Call light placed within reach.

## 2025-07-30 NOTE — FLOWSHEET NOTE
INPATIENT CONSULT    Maternal /para status:     Maternal breastfeeding history:   First baby was formula fed, second baby breast fed for one year.     Current pregnancy:    Gestational age: 39 1/7 weeks     C/section or vaginal delivery:  with MSAF      Birth weight: 3295 gms    Plan for feeding: breast      Breast pump at home: yes        Assessment of breastfeeding:  Pt states baby fed well right after delivery and has been sleepy since.  Discussed  feeding patterns, encouraged to try every 2-3 hours.        Reviewed:   - Breastfeeding packet  - Expectations for normal  feeding   - Hand expression  - Deep latch/milk transfer  - Cues for feeding (early/late)     Encouraged:   - Frequent skin to skin with mom or dad  - Frequent attempts to feed  - Calling for assistance as needed

## 2025-07-30 NOTE — FLOWSHEET NOTE
Pt to L&D with reports of contractions starting 7/28 at night but they got more intense in the morning. Pt also reports pressure in back and lower abdomen. Pt reports small gush of pink-tinged fluid earlier in the day around 0700. Pt denies vaginal bleeding. Pt reports fetal movement. EFM applied, FHT's 150.

## 2025-07-30 NOTE — DISCHARGE SUMMARY
Obstetric Discharge Summary  [facility]    Patient Name: Latisha Haddad  Patient : 2000  Primary Care Physician: Paradise, Pcp  Admit Date: 2025    Principal Diagnosis: IUP at 39w1d, admitted for spontaneous labor       Her pregnancy has been complicated by:   Patient Active Problem List   Diagnosis    Martha-Parkinson-White    Hx GDMA1    Anemia affecting pregnancy     23 F Apg 8/9 Wt 7#9    Family history of congenital heart defect    37 weeks gestation of pregnancy    Iron deficiency anemia    Normal labor and delivery     25 F Apg 8/9 Wt 7#5       Infection Present?: No  Hospital Acquired: N/A    Surgical Operations & Procedures:  Analgesia: none  Delivery Type: Spontaneous Vaginal Delivery   Laceration(s): Absent    Consultations: NICU    Pertinent Findings & Procedures:   Latisha Haddad is a 24 y.o. female  at 39w1d admitted for spontaneous labor. Received pen G for GBS prophylaxis, amniotomy, and progressed to complete.     She delivered by spontaneous vaginal a Live Born infant on .       Information for the patient's :  Catie, Girl Latisha [[id number]]   female   Birth Weight: 3.295 kg (7 lb 4.2 oz)    Apgars: 8 at 1 minute and 9 at 5 minutes.     Postpartum course: normal.    PPD#1: Hgb 8.7. Patient asymptomatic. Rx iron sent.    Course of patient: uncomplicated    Discharge to: Home    Readmission planned: no     Indication for 6 week PP 2 hour GTT?: no     Eligible for 2 week PP virtual visit? yes    Contraception: no method     Recommendations on Discharge:     Medications:      Medication List        START taking these medications      acetaminophen 500 MG tablet  Commonly known as: TYLENOL  Take 2 tablets by mouth every 6 hours as needed for Pain     ibuprofen 600 MG tablet  Commonly known as: ADVIL;MOTRIN  Take 1 tablet by mouth every 6 hours as needed for Pain     sennosides-docusate sodium 8.6-50 MG tablet  Commonly known as: SENOKOT-S  Take 1 tablet  decrease the exposure to secondary smoke to her children.

## 2025-07-30 NOTE — CARE COORDINATION
CASE MANAGEMENT POST-PARTUM TRANSITIONAL CARE PLAN    39 weeks gestation of pregnancy [Z3A.39]    OB Provider: Dr. Shadi Forrester met w/ Latisha at her bedside to discuss DCP. She is S/P  on 2025 of female .     to WIN    Writer verified address, her phone number and emergency contacts are correct on facesheet. She lives with her parents. Latisha denied barriers with transportation home, to doctor's appointments or with paying for medications upon discharge home.     [facility] plan insurance correct. Writer notified Latisha she has 30 days from date of birth to add  to insurance policy. Latisha verbalized understanding.    Infant name on BC: IShelley Dumont.   Infant PCP [facility].     DME: None  HOME CARE: None      [facility] RISK OF UNPLANNED READMISSION 2.0             3.5 Total Score

## 2025-07-30 NOTE — PROGRESS NOTES
Labor Progress Note    Latisha Haddad is a 24 y.o. female  at 39w1d   The patient was seen and examined. Her pain is well controlled. She reports fetal movement is present, complains of contractions, complains of loss of fluid, denies vaginal bleeding.     Patient requested evaluation for leaking of fluid and possible amniotomy. Nitrazine testing at bedside was negative. SVE revealed 9/90/0. Patient requested amniotomy. Patient had just completed the first dose of penicillin.    I have discussed the risk and benefits of AROM and the alternatives with the patient. Risks include cord prolapse, risk of infection, and emergency . All questions answered to their satisfaction. Patient amendable to AROM at this time.      Vital Signs:  Vitals:    25 0052 25 0248   BP: 122/75 (!) 121/56   Pulse: (!) 109 76   Resp: 18 16   Temp: 98.2 °F (36.8 °C)    TempSrc: Oral    SpO2:  99%     FHT: 125, moderate variability, accelerations present, intermittent variable decelerations with spontaneous return to baseline   Contractions: difficult to  TOCO     Chaperone for Intimate Exam: Chaperone was present for entire exam, Chaperone Name: Sherin WETZEL   Cervical Exam: 9 cm dilated, 90 effaced, 0 station  Pitocin: @ 0 mu/min    Membranes: Ruptured meconium stained  Scalp Electrode in place: absent  Intrauterine Pressure Catheter in Place: absent    Interventions: SVE and AROM     Assessment/Plan:  Latisha Haddad is a 24 y.o. female  at 39w1d admitted for Spontaneous labor    - GBS positive, Pen G for GBS prophylaxis   - VSS, afebrile    - Overall Cat 1 FHT    - AROM ([facility]) @ 0205   - SVE 9/90%/0   - cEFM and TOCO   - Continue expectant management. Anticipate delivery soon.    Will update attending     Aditi Ritchie MD  Ob/Gyn Resident  2025, 2:56 AM

## 2025-07-31 LAB
HCT VFR BLD AUTO: 29.4 % (ref 36.3–47.1)
HGB BLD-MCNC: 8.7 G/DL (ref 11.9–15.1)

## 2025-07-31 PROCEDURE — 36415 COLL VENOUS BLD VENIPUNCTURE: CPT

## 2025-07-31 PROCEDURE — 2500000003 HC RX 250 WO HCPCS

## 2025-07-31 PROCEDURE — 6370000000 HC RX 637 (ALT 250 FOR IP)

## 2025-07-31 PROCEDURE — 85014 HEMATOCRIT: CPT

## 2025-07-31 PROCEDURE — 90715 TDAP VACCINE 7 YRS/> IM: CPT

## 2025-07-31 PROCEDURE — 1220000000 HC SEMI PRIVATE OB R&B

## 2025-07-31 PROCEDURE — 90471 IMMUNIZATION ADMIN: CPT

## 2025-07-31 PROCEDURE — 99024 POSTOP FOLLOW-UP VISIT: CPT | Performed by: OBSTETRICS & GYNECOLOGY

## 2025-07-31 PROCEDURE — 85018 HEMOGLOBIN: CPT

## 2025-07-31 PROCEDURE — 6360000002 HC RX W HCPCS

## 2025-07-31 RX ORDER — FERROUS SULFATE 325(65) MG
325 TABLET ORAL
Qty: 180 TABLET | Refills: 3 | Status: SHIPPED | OUTPATIENT
Start: 2025-07-31

## 2025-07-31 RX ADMIN — IBUPROFEN 600 MG: 600 TABLET ORAL at 20:10

## 2025-07-31 RX ADMIN — ACETAMINOPHEN 1000 MG: 500 TABLET ORAL at 20:09

## 2025-07-31 RX ADMIN — ACETAMINOPHEN 1000 MG: 500 TABLET ORAL at 12:07

## 2025-07-31 RX ADMIN — FERROUS SULFATE TAB EC 325 MG (65 MG FE EQUIVALENT) 325 MG: 325 (65 FE) TABLET DELAYED RESPONSE at 20:10

## 2025-07-31 RX ADMIN — SODIUM CHLORIDE, PRESERVATIVE FREE 10 ML: 5 INJECTION INTRAVENOUS at 15:18

## 2025-07-31 RX ADMIN — IBUPROFEN 600 MG: 600 TABLET ORAL at 12:08

## 2025-07-31 RX ADMIN — SENNOSIDES, DOCUSATE SODIUM 1 TABLET: 50; 8.6 TABLET, FILM COATED ORAL at 12:07

## 2025-07-31 RX ADMIN — TETANUS TOXOID, REDUCED DIPHTHERIA TOXOID AND ACELLULAR PERTUSSIS VACCINE, ADSORBED 0.5 ML: 5; 2.5; 8; 8; 2.5 SUSPENSION INTRAMUSCULAR at 20:10

## 2025-07-31 NOTE — PLAN OF CARE
Problem: Chronic Conditions and Co-morbidities  Goal: Patient's chronic conditions and co-morbidity symptoms are monitored and maintained or improved  Outcome: Progressing     Problem: Postpartum  Goal: Experiences normal postpartum course  Description:  Postpartum OB-Pregnancy care plan goal which identifies if the mother is experiencing a normal postpartum course  Outcome: Progressing  Goal: Appropriate maternal -  bonding  Description:  Postpartum OB-Pregnancy care plan goal which identifies if the mother and  are bonding appropriately  Outcome: Progressing  Goal: Establishment of infant feeding pattern  Description:  Postpartum OB-Pregnancy care plan goal which identifies if the mother is establishing a feeding pattern with their   Outcome: Progressing  Goal: Incisions, wounds, or drain sites healing without S/S of infection  Outcome: Progressing     Problem: Pain  Goal: Verbalizes/displays adequate comfort level or baseline comfort level  Outcome: Progressing     Problem: Safety - Adult  Goal: Free from fall injury  Outcome: Progressing     Problem: Discharge Planning  Goal: Discharge to home or other facility with appropriate resources  Outcome: Progressing

## 2025-07-31 NOTE — LACTATION NOTE
Mom reported that her baby fed well on the right breast from 12:15 to 1250. Encouraged her to call out for assistance as needed.    Performing Laboratory: -[phone] Performing Laboratory: -2330

## 2025-07-31 NOTE — PROGRESS NOTES
POST PARTUM DAY # 1    Latisha Haddad is a 24 y.o. female  This patient was seen & examined today.     Her pregnancy was complicated by:   Patient Active Problem List   Diagnosis    Martha-Parkinson-White    Hx GDMA1    Anemia affecting pregnancy     23 F Apg 8/9 Wt 7#9    Family history of congenital heart defect    37 weeks gestation of pregnancy    Iron deficiency anemia    Normal labor and delivery     25 F Apg 8/9 Wt 7#5       Today she is doing well without any chief complaint. Her lochia is light. She denies chest pain, shortness of breath, headache, lightheadedness, blurred vision, and peripheral edema. She is  breast feeding and she denies any breast tenderness. She is ambulating well. Her voiding pattern is normal. I reviewed signs and symptoms of post partum depression with the patient, she currently denies any of these symptoms. She is tolerating solids.     Vital Signs:  Vitals:    25 0801 25 1524 25 0311   BP: 105/61 109/76 104/69 97/60   Pulse: 80 80 85 94   Resp: 18 20 16 17   Temp: 97.9 °F (36.6 °C) 97.9 °F (36.6 °C) 98.1 °F (36.7 °C) 97.7 °F (36.5 °C)   TempSrc:   Oral Oral   SpO2: 98% 98% 99% 98%         Physical Exam:  General:  no apparent distress, alert, and cooperative  Neurologic:  alert, oriented, normal speech, no focal findings or movement disorder noted  Lungs:  No increased work of breathing  Heart:  regular rate and rhythm    Abdomen: abdomen soft, non-distended, non-tender  Fundus: non-tender, normal size, firm, below umbilicus  Extremities:  no calf tenderness, non edematous    Lab:  Lab Results   Component Value Date    HGB 9.2 (L) 2025     Lab Results   Component Value Date    HCT 31.7 (L) 2025       Assessment/Plan:  Latisha Haddad is a  PPD # 2 s/p    - Doing well, VSS   - Female infant in General Care Nursery   - Encourage ambulation   - Postpartum Hgb/Hct pending this AM     Rh positive/Rubella immune  - Rhogam not

## 2025-07-31 NOTE — LACTATION NOTE
Mom reports she was unable to get her baby to breastfeed at her early morning feed. Reviewed skin to skin or a gentle back rub to help baby alert for a feeding. Reviewed feeding patterns. Encouraged her to call out for assistance as needed.

## 2025-08-01 VITALS
RESPIRATION RATE: 16 BRPM | OXYGEN SATURATION: 97 % | DIASTOLIC BLOOD PRESSURE: 73 MMHG | HEART RATE: 81 BPM | TEMPERATURE: 97.7 F | SYSTOLIC BLOOD PRESSURE: 115 MMHG

## 2025-08-01 PROCEDURE — 6370000000 HC RX 637 (ALT 250 FOR IP)

## 2025-08-01 RX ADMIN — IBUPROFEN 600 MG: 600 TABLET ORAL at 02:10

## 2025-08-01 RX ADMIN — SENNOSIDES, DOCUSATE SODIUM 1 TABLET: 50; 8.6 TABLET, FILM COATED ORAL at 08:05

## 2025-08-01 RX ADMIN — MAGNESIUM HYDROXIDE 30 ML: 400 SUSPENSION ORAL at 08:05

## 2025-08-01 RX ADMIN — ACETAMINOPHEN 1000 MG: 500 TABLET ORAL at 02:10

## 2025-08-01 NOTE — FLOWSHEET NOTE
Discharge instructions given, all questions answered.  I have reviewed all [facility] Post-Birth Warning Signs and essential teaching points for pulmonary embolism, cardiac disease, hypertensive disorders of pregnancy, obstetric hemorrhage, venous thromboembolism, infection, and postpartum depression with the patient and mother (support person) . I have informed the patient on when to call their healthcare provider and when to call 911. I have discussed with the patient  the importance of scheduling a follow-up visit with their physician, nurse practitioner or midwife and provided them with correct contact information for appointment. I have provided the patient with a copy of the \"Save Your Life\" handout. The patient has acknowledged receiving and understanding this education with her signature.

## 2025-08-01 NOTE — PROGRESS NOTES
CLINICAL PHARMACY NOTE: MEDS TO BEDS    Total # of Prescriptions Filled: 4   The following medications were delivered to the patient:  Ibuprofen  Acetaminophen  Ferosul  Senexon-s    Additional Documentation: Delivered 8/1 @ 10:21 am.

## 2025-08-01 NOTE — PLAN OF CARE
Problem: Chronic Conditions and Co-morbidities  Goal: Patient's chronic conditions and co-morbidity symptoms are monitored and maintained or improved  2025 140 by Susan Petty RN  Outcome: Adequate for Discharge  2025 040 by Alaina Aquino RN  Outcome: Progressing     Problem: Postpartum  Goal: Experiences normal postpartum course  Description:  Postpartum OB-Pregnancy care plan goal which identifies if the mother is experiencing a normal postpartum course  2025 140 by Susan Petty RN  Outcome: Adequate for Discharge  2025 0400 by Alaina Aquino RN  Outcome: Progressing  Goal: Appropriate maternal -  bonding  Description:  Postpartum OB-Pregnancy care plan goal which identifies if the mother and  are bonding appropriately  2025 140 by Susan Petty RN  Outcome: Adequate for Discharge  2025 by Alaina Aquino RN  Outcome: Progressing  Goal: Establishment of infant feeding pattern  Description:  Postpartum OB-Pregnancy care plan goal which identifies if the mother is establishing a feeding pattern with their   2025 140 by Susan Petty RN  Outcome: Adequate for Discharge  2025 040 by Alaina Aquino RN  Outcome: Progressing  Goal: Incisions, wounds, or drain sites healing without S/S of infection  2025 by Susan Petty RN  Outcome: Adequate for Discharge  2025 by Alaina Aquino RN  Outcome: Progressing     Problem: Pain  Goal: Verbalizes/displays adequate comfort level or baseline comfort level  2025 by Susan Petty RN  Outcome: Adequate for Discharge  2025 0400 by Alaina Aquino RN  Outcome: Progressing     Problem: Safety - Adult  Goal: Free from fall injury  2025 by Susan Petty RN  Outcome: Adequate for Discharge  2025 by Alaina Aquino RN  Outcome: Progressing     Problem: Discharge Planning  Goal: Discharge to home or other facility with appropriate

## 2025-08-01 NOTE — PROGRESS NOTES
POST PARTUM DAY # 2    Latisha Haddad is a 24 y.o. female  This patient was seen & examined today.     Her pregnancy was complicated by:   Patient Active Problem List   Diagnosis    Martha-Parkinson-White    Hx GDMA1    Anemia affecting pregnancy     23 F Apg 8/9 Wt 7#9    Family history of congenital heart defect    37 weeks gestation of pregnancy    Iron deficiency anemia    Normal labor and delivery     25 F Apg 8/9 Wt 7#5       Today she is doing well without any chief complaint. Her lochia is light. She denies chest pain, shortness of breath, headache, lightheadedness, blurred vision, peripheral edema, and palpitations. She is  breast feeding and she denies any breast tenderness. She is ambulating well. Her voiding pattern is normal. I reviewed signs and symptoms of post partum depression with the patient, she currently denies any of these symptoms. She is tolerating solids.     Vital Signs:  Vitals:    25 0849 25 1503 25 2016 25 0354   BP: 102/65 (!) 92/57 107/66 108/73   Pulse: 71 83 67 58   Resp: 18 16 12 14   Temp: 97.9 °F (36.6 °C) 98 °F (36.7 °C) 97.7 °F (36.5 °C) 97.9 °F (36.6 °C)   TempSrc: Oral Oral Oral Oral   SpO2: 98%  98%          Physical Exam:  General:  no apparent distress, alert, and cooperative  Neurologic:  alert, oriented, normal speech, no focal findings or movement disorder noted  Lungs:  No increased work of breathing  Heart:  regular rate and rhythm    Abdomen: abdomen soft, non-distended, non-tender  Fundus: non-tender, normal size, firm, below umbilicus  Extremities:  no calf tenderness, non edematous    Lab:  Lab Results   Component Value Date    HGB 8.7 (L) 2025     Lab Results   Component Value Date    HCT 29.4 (L) 2025       Assessment/Plan:  Latisha Haddad is a  PPD # 2 s/p    - Doing well, VSS   - Female infant in General Care Nursery   - Encourage ambulation   - Postpartum Hgb 8.7 from 9.2 on admission   -

## 2025-08-01 NOTE — LACTATION NOTE
Pt states baby has been sleepy at breast this am, ounce of formula given just before 5am. Reviewed discharge information including  feeding expectations, importance of frequent removal every 2-3 hours. Has a pump at home if needed. Encouraged to reach out to lactation as needed.

## 2025-08-01 NOTE — CARE COORDINATION
Discharge Report    [facility]  Clinical Case Management Department  Written by: BRETT ESTRADA RN    Patient Name: Latisha Haddad  Attending Provider: Daina Chery*  Admit Date: 2025 12:23 AM  MRN: [id number]  Account: [id number]                     : 2000  Discharge Date: 25      Disposition: home    BRETT ESTRADA RN

## 2025-08-04 DIAGNOSIS — D50.8 IRON DEFICIENCY ANEMIA SECONDARY TO INADEQUATE DIETARY IRON INTAKE: ICD-10-CM

## 2025-08-04 DIAGNOSIS — O99.013 ANEMIA AFFECTING PREGNANCY IN THIRD TRIMESTER: Primary | ICD-10-CM

## 2025-08-04 LAB — SURGICAL PATHOLOGY REPORT: NORMAL

## 2025-08-05 ENCOUNTER — TELEPHONE (OUTPATIENT)
Age: 25
End: 2025-08-05

## 2025-08-13 ENCOUNTER — OFFICE VISIT (OUTPATIENT)
Dept: OBGYN CLINIC | Age: 25
End: 2025-08-13
Payer: COMMERCIAL

## 2025-08-13 VITALS
WEIGHT: 126 LBS | BODY MASS INDEX: 21.51 KG/M2 | DIASTOLIC BLOOD PRESSURE: 68 MMHG | HEART RATE: 78 BPM | RESPIRATION RATE: 18 BRPM | SYSTOLIC BLOOD PRESSURE: 112 MMHG | HEIGHT: 64 IN

## 2025-08-13 PROCEDURE — 99213 OFFICE O/P EST LOW 20 MIN: CPT | Performed by: NURSE PRACTITIONER

## 2025-08-13 PROCEDURE — G8428 CUR MEDS NOT DOCUMENT: HCPCS | Performed by: NURSE PRACTITIONER

## 2025-08-13 PROCEDURE — G8420 CALC BMI NORM PARAMETERS: HCPCS | Performed by: NURSE PRACTITIONER

## 2025-08-13 PROCEDURE — 1036F TOBACCO NON-USER: CPT | Performed by: NURSE PRACTITIONER

## 2025-08-13 PROCEDURE — 1111F DSCHRG MED/CURRENT MED MERGE: CPT | Performed by: NURSE PRACTITIONER

## 2025-08-15 ENCOUNTER — TELEPHONE (OUTPATIENT)
Age: 25
End: 2025-08-15

## 2025-08-15 ENCOUNTER — OFFICE VISIT (OUTPATIENT)
Age: 25
End: 2025-08-15
Payer: COMMERCIAL

## 2025-08-15 VITALS
OXYGEN SATURATION: 97 % | SYSTOLIC BLOOD PRESSURE: 97 MMHG | HEART RATE: 60 BPM | TEMPERATURE: 97.4 F | BODY MASS INDEX: 21.46 KG/M2 | DIASTOLIC BLOOD PRESSURE: 60 MMHG | WEIGHT: 125 LBS

## 2025-08-15 DIAGNOSIS — D50.0 BLOOD LOSS ANEMIA: ICD-10-CM

## 2025-08-15 DIAGNOSIS — D50.8 IRON DEFICIENCY ANEMIA SECONDARY TO INADEQUATE DIETARY IRON INTAKE: Primary | ICD-10-CM

## 2025-08-15 PROCEDURE — 99213 OFFICE O/P EST LOW 20 MIN: CPT | Performed by: INTERNAL MEDICINE

## 2025-08-15 PROCEDURE — 99211 OFF/OP EST MAY X REQ PHY/QHP: CPT | Performed by: INTERNAL MEDICINE

## 2025-09-02 ENCOUNTER — TELEPHONE (OUTPATIENT)
Dept: OBGYN CLINIC | Age: 25
End: 2025-09-02

## 2025-09-02 RX ORDER — FLUCONAZOLE 150 MG/1
150 TABLET ORAL DAILY
Qty: 10 TABLET | Refills: 0 | Status: SHIPPED | OUTPATIENT
Start: 2025-09-02